# Patient Record
Sex: FEMALE | Race: WHITE | Employment: UNEMPLOYED | URBAN - METROPOLITAN AREA
[De-identification: names, ages, dates, MRNs, and addresses within clinical notes are randomized per-mention and may not be internally consistent; named-entity substitution may affect disease eponyms.]

---

## 2017-02-26 ENCOUNTER — HOSPITAL ENCOUNTER (INPATIENT)
Age: 27
LOS: 2 days | Discharge: ANOTHER HOSPITAL | DRG: 812 | End: 2017-03-01
Attending: EMERGENCY MEDICINE | Admitting: HOSPITALIST
Payer: MEDICAID

## 2017-02-26 DIAGNOSIS — T50.902A OVERDOSE, INTENTIONAL SELF-HARM, INITIAL ENCOUNTER (HCC): Primary | ICD-10-CM

## 2017-02-26 DIAGNOSIS — N30.00 ACUTE CYSTITIS WITHOUT HEMATURIA: ICD-10-CM

## 2017-02-26 DIAGNOSIS — T44.7X2A SUICIDE ATTEMPT BY BETA BLOCKER OVERDOSE, INITIAL ENCOUNTER (HCC): ICD-10-CM

## 2017-02-26 LAB
ALBUMIN SERPL BCP-MCNC: 4.2 G/DL (ref 3.4–5)
ALBUMIN/GLOB SERPL: 1.3 {RATIO} (ref 0.8–1.7)
ALP SERPL-CCNC: 67 U/L (ref 45–117)
ALT SERPL-CCNC: 18 U/L (ref 13–56)
AMPHET UR QL SCN: POSITIVE
ANION GAP BLD CALC-SCNC: 7 MMOL/L (ref 3–18)
APAP SERPL-MCNC: <2 UG/ML (ref 10–30)
APAP SERPL-MCNC: <2 UG/ML (ref 10–30)
APPEARANCE UR: CLEAR
AST SERPL W P-5'-P-CCNC: 13 U/L (ref 15–37)
BACTERIA URNS QL MICRO: ABNORMAL /HPF
BARBITURATES UR QL SCN: NEGATIVE
BASOPHILS # BLD AUTO: 0 K/UL (ref 0–0.06)
BASOPHILS # BLD: 0 % (ref 0–2)
BENZODIAZ UR QL: NEGATIVE
BILIRUB SERPL-MCNC: 0.5 MG/DL (ref 0.2–1)
BILIRUB UR QL: NEGATIVE
BUN SERPL-MCNC: 16 MG/DL (ref 7–18)
BUN/CREAT SERPL: 15 (ref 12–20)
CALCIUM SERPL-MCNC: 9.1 MG/DL (ref 8.5–10.1)
CANNABINOIDS UR QL SCN: NEGATIVE
CHLORIDE SERPL-SCNC: 104 MMOL/L (ref 100–108)
CO2 SERPL-SCNC: 28 MMOL/L (ref 21–32)
COCAINE UR QL SCN: NEGATIVE
COLOR UR: YELLOW
CREAT SERPL-MCNC: 1.07 MG/DL (ref 0.6–1.3)
DIFFERENTIAL METHOD BLD: ABNORMAL
EOSINOPHIL # BLD: 0.1 K/UL (ref 0–0.4)
EOSINOPHIL NFR BLD: 1 % (ref 0–5)
EPITH CASTS URNS QL MICRO: ABNORMAL /LPF (ref 0–5)
ERYTHROCYTE [DISTWIDTH] IN BLOOD BY AUTOMATED COUNT: 16 % (ref 11.6–14.5)
ETHANOL SERPL-MCNC: <3 MG/DL (ref 0–3)
GLOBULIN SER CALC-MCNC: 3.2 G/DL (ref 2–4)
GLUCOSE SERPL-MCNC: 97 MG/DL (ref 74–99)
GLUCOSE UR STRIP.AUTO-MCNC: NEGATIVE MG/DL
HCG UR QL: NEGATIVE
HCT VFR BLD AUTO: 37.2 % (ref 35–45)
HDSCOM,HDSCOM: ABNORMAL
HGB BLD-MCNC: 11.7 G/DL (ref 12–16)
HGB UR QL STRIP: NEGATIVE
KETONES UR QL STRIP.AUTO: NEGATIVE MG/DL
LEUKOCYTE ESTERASE UR QL STRIP.AUTO: ABNORMAL
LYMPHOCYTES # BLD AUTO: 30 % (ref 21–52)
LYMPHOCYTES # BLD: 2.4 K/UL (ref 0.9–3.6)
MCH RBC QN AUTO: 28.1 PG (ref 24–34)
MCHC RBC AUTO-ENTMCNC: 31.5 G/DL (ref 31–37)
MCV RBC AUTO: 89.2 FL (ref 74–97)
METHADONE UR QL: NEGATIVE
MONOCYTES # BLD: 0.5 K/UL (ref 0.05–1.2)
MONOCYTES NFR BLD AUTO: 6 % (ref 3–10)
NEUTS SEG # BLD: 5.2 K/UL (ref 1.8–8)
NEUTS SEG NFR BLD AUTO: 63 % (ref 40–73)
NITRITE UR QL STRIP.AUTO: NEGATIVE
OPIATES UR QL: POSITIVE
PCP UR QL: NEGATIVE
PH UR STRIP: 5.5 [PH] (ref 5–8)
PLATELET # BLD AUTO: 259 K/UL (ref 135–420)
PMV BLD AUTO: 11.1 FL (ref 9.2–11.8)
POTASSIUM SERPL-SCNC: 3.7 MMOL/L (ref 3.5–5.5)
PROT SERPL-MCNC: 7.4 G/DL (ref 6.4–8.2)
PROT UR STRIP-MCNC: NEGATIVE MG/DL
RBC # BLD AUTO: 4.17 M/UL (ref 4.2–5.3)
RBC #/AREA URNS HPF: ABNORMAL /HPF (ref 0–5)
SALICYLATES SERPL-MCNC: <2.8 MG/DL (ref 2.8–20)
SODIUM SERPL-SCNC: 139 MMOL/L (ref 136–145)
SP GR UR REFRACTOMETRY: >1.03 (ref 1–1.03)
UROBILINOGEN UR QL STRIP.AUTO: 0.2 EU/DL (ref 0.2–1)
WBC # BLD AUTO: 8.2 K/UL (ref 4.6–13.2)
WBC URNS QL MICRO: ABNORMAL /HPF (ref 0–4)

## 2017-02-26 PROCEDURE — 80307 DRUG TEST PRSMV CHEM ANLYZR: CPT | Performed by: EMERGENCY MEDICINE

## 2017-02-26 PROCEDURE — 74011250637 HC RX REV CODE- 250/637: Performed by: EMERGENCY MEDICINE

## 2017-02-26 PROCEDURE — 96376 TX/PRO/DX INJ SAME DRUG ADON: CPT

## 2017-02-26 PROCEDURE — 99285 EMERGENCY DEPT VISIT HI MDM: CPT

## 2017-02-26 PROCEDURE — 80053 COMPREHEN METABOLIC PANEL: CPT | Performed by: EMERGENCY MEDICINE

## 2017-02-26 PROCEDURE — 93005 ELECTROCARDIOGRAM TRACING: CPT

## 2017-02-26 PROCEDURE — 85025 COMPLETE CBC W/AUTO DIFF WBC: CPT | Performed by: EMERGENCY MEDICINE

## 2017-02-26 PROCEDURE — 81001 URINALYSIS AUTO W/SCOPE: CPT | Performed by: EMERGENCY MEDICINE

## 2017-02-26 PROCEDURE — 96361 HYDRATE IV INFUSION ADD-ON: CPT

## 2017-02-26 PROCEDURE — 74011250636 HC RX REV CODE- 250/636: Performed by: EMERGENCY MEDICINE

## 2017-02-26 PROCEDURE — 87086 URINE CULTURE/COLONY COUNT: CPT | Performed by: EMERGENCY MEDICINE

## 2017-02-26 PROCEDURE — 81025 URINE PREGNANCY TEST: CPT | Performed by: EMERGENCY MEDICINE

## 2017-02-26 PROCEDURE — 96375 TX/PRO/DX INJ NEW DRUG ADDON: CPT

## 2017-02-26 PROCEDURE — 96365 THER/PROPH/DIAG IV INF INIT: CPT

## 2017-02-26 RX ORDER — NITROFURANTOIN 25; 75 MG/1; MG/1
100 CAPSULE ORAL 2 TIMES DAILY
Status: DISCONTINUED | OUTPATIENT
Start: 2017-02-26 | End: 2017-03-01 | Stop reason: HOSPADM

## 2017-02-26 RX ORDER — ONDANSETRON 2 MG/ML
4 INJECTION INTRAMUSCULAR; INTRAVENOUS
Status: COMPLETED | OUTPATIENT
Start: 2017-02-26 | End: 2017-02-26

## 2017-02-26 RX ORDER — CALCIUM GLUCONATE 94 MG/ML
1 INJECTION, SOLUTION INTRAVENOUS
Status: DISCONTINUED | OUTPATIENT
Start: 2017-02-26 | End: 2017-02-26

## 2017-02-26 RX ORDER — ONDANSETRON 4 MG/1
4 TABLET, ORALLY DISINTEGRATING ORAL
Status: COMPLETED | OUTPATIENT
Start: 2017-02-26 | End: 2017-02-26

## 2017-02-26 RX ORDER — CALCIUM GLUCONATE 94 MG/ML
3.5 INJECTION, SOLUTION INTRAVENOUS
Status: COMPLETED | OUTPATIENT
Start: 2017-02-26 | End: 2017-02-27

## 2017-02-26 RX ADMIN — ONDANSETRON 4 MG: 4 TABLET, ORALLY DISINTEGRATING ORAL at 18:47

## 2017-02-26 RX ADMIN — NITROFURANTOIN (MONOHYDRATE/MACROCRYSTALS) 100 MG: 75; 25 CAPSULE ORAL at 18:49

## 2017-02-26 RX ADMIN — SODIUM CHLORIDE 1000 ML: 900 INJECTION, SOLUTION INTRAVENOUS at 20:11

## 2017-02-26 RX ADMIN — ONDANSETRON 4 MG: 2 INJECTION INTRAMUSCULAR; INTRAVENOUS at 20:11

## 2017-02-26 RX ADMIN — SODIUM CHLORIDE 1000 ML: 900 INJECTION, SOLUTION INTRAVENOUS at 21:18

## 2017-02-26 RX ADMIN — SODIUM CHLORIDE 1000 ML: 900 INJECTION, SOLUTION INTRAVENOUS at 22:56

## 2017-02-26 RX ADMIN — GLUCAGON HYDROCHLORIDE 5 MG: KIT at 23:43

## 2017-02-26 NOTE — ED PROVIDER NOTES
HPI Comments: 3:53 PM Dhara Lynch is a 23 y.o. Female with a history of thyroid disease, seizure, and HTN presenting to the ED via EMS with SI. Pt states she took labetal , almost the entire bottle, and ibuprofen , a large handful. She took the ibuprofen 1.5 hours ago and labetalol immediately prior to EMS arrival. She has attempted suicide before, she states she \"took pills\" 7 years ago. She feels mild pain in upper abd, drowsiness, and light headedness currently. Pt denies nausea, vomiting, diarrhea, fever, CP, and cough. No other complaints at this time. The history is provided by the EMS personnel. Past Medical History:   Diagnosis Date    Endocrine disease Hypothyroidism    Hypertension     Seizures ([facility] [address]     epilepsy    Thyroid disease        Past Surgical History:   Procedure Laterality Date    HX TONSILLECTOMY      Age 7         Family History:   Problem Relation Age of Onset    Diabetes Other      great grandmother    Hypertension Other      family history    Thyroid Disease Other      great grandmother       Social History     Social History    Marital status: SINGLE     Spouse name: N/A    Number of children: N/A    Years of education: N/A     Occupational History    Not on file. Social History Main Topics    Smoking status: Former Smoker     Packs/day: 0.50     Years: 5.00    Smokeless tobacco: Never Used    Alcohol use 0.0 oz/week     0 Standard drinks or equivalent per week      Comment: sometimes    Drug use: No      Comment: pt denies    Sexual activity: Yes     Partners: Male     Birth control/ protection: None     Other Topics Concern    Not on file     Social History Narrative         ALLERGIES: Review of patient's allergies indicates no known allergies. Review of Systems   Constitutional: Negative for fever. HENT: Negative for trouble swallowing. Respiratory: Negative for shortness of breath. Cardiovascular: Negative for chest pain. Gastrointestinal: Negative for abdominal pain, diarrhea and vomiting. Genitourinary: Negative for difficulty urinating. Musculoskeletal: Negative for back pain and neck pain. Skin: Negative for wound. Neurological: Negative for syncope. Psychiatric/Behavioral: Positive for self-injury (overdose on prescription medication) and suicidal ideas. Negative for behavioral problems. All other systems reviewed and are negative. Vitals:    02/26/17 1645 02/26/17 1700 02/26/17 1715 02/26/17 1730   BP: 125/80 123/85 125/78 124/83   Pulse: 74 69 69 79   Resp: 18 14 17 18   Temp:       SpO2: 98% 99% 97% 99%   Weight:       Height:                Physical Exam   Constitutional: She is oriented to person, place, and time. She appears well-developed. No distress. Chronically ill -appearing, nad   HENT:   Head: Normocephalic and atraumatic. Eyes: EOM are normal.   Neck: Normal range of motion. Cardiovascular: Normal rate. Pulmonary/Chest: Effort normal and breath sounds normal. No respiratory distress. Abdominal: Soft. There is no tenderness. Musculoskeletal: Normal range of motion. Mechanically stable   Neurological: She is alert and oriented to person, place, and time. No focal deficits noted   Psychiatric: Her behavior is normal. She expresses suicidal ideation. Nursing note and vitals reviewed. MDM  Number of Diagnoses or Management Options  Acute cystitis without hematuria:   Overdose, intentional self-harm, initial encounter [facility]):   Diagnosis management comments: 24 yo CF with PMHx HTN presents by EMS with intentional overdose of ibuprofen and labetalol. Pt still actively suicidal.  Examination unremarkable. Pt will need psych eval after medical clearance. 5:45 PM  Pt more awake and alert, work-up unremarkable so far and VSS. I discussed pt with poison control, who recommended 4-hr acetaminophen level, consider charcoal, continue to monitor.   Will not give charcoal at this time given asymptomatic with normal VS since arrival.  I updated pt and family on results and poc. 6:48 PM  +uti, will culture and treat. +opiates, +amphetamines. Negative pregnancy. Care to be transferred to Dr. Saba Wang at end of shift pending 4-hr acetaminophen and psych evaluation/recommendations       Amount and/or Complexity of Data Reviewed  Clinical lab tests: reviewed and ordered  Decide to obtain previous medical records or to obtain history from someone other than the patient: yes  Obtain history from someone other than the patient: yes  Review and summarize past medical records: yes  Discuss the patient with other providers: yes  Independent visualization of images, tracings, or specimens: yes      ED Course       EKG  Date/Time: 2/26/2017 5:12 PM  Performed by: Sachi Dennis  Authorized by: Sachi Dennis     ECG reviewed by ED Physician in the absence of a cardiologist: yes    Previous ECG:     Previous ECG:  Compared to current    Comparison ECG info:  10/28/13    Similarity:  No change  Interpretation:     Interpretation: non-specific    Rate:     ECG rate:  69    ECG rate assessment: normal    Rhythm:     Rhythm: sinus rhythm    Ectopy:     Ectopy: none    QRS:     QRS axis:  Normal    QRS intervals:  Normal  Conduction:     Conduction: normal    ST segments:     ST segments:  Normal  T waves:     T waves: flattening          Progress notes, Consult notes or additional Procedure notes:   5:03 PM Pratibha Santamaria MD discussed care with Poison Control who recommends monitoring for hypotension and bradycardia, abd pain, and vomiting, and check acetaminophen level at 4 hours consider activated charcoal if the patient can tolerate. Standard discussion; including history of patients chief complaint, available diagnostic results, and treatment course. 5:36 PM Pt's mother at the bedside.  She states the patient has expressed her depression to her and she wanted her to see her PCP for psych follow up but she did not want to go. Addison Kwok MD  discussed findings and plan for psych consult. 7:00 PM Pt turned over to Dr. Conor Higuera pending repeat acetaminophen level at 8:00PM and Psych evaluation. Vitals:  Patient Vitals for the past 12 hrs:   Temp Pulse Resp BP SpO2   02/26/17 1730 - 79 18 124/83 99 %   02/26/17 1715 - 69 17 125/78 97 %   02/26/17 1700 - 69 14 123/85 99 %   02/26/17 1645 - 74 18 125/80 98 %   02/26/17 1630 - 73 14 123/82 99 %   02/26/17 1626 98.4 °F (36.9 °C) - - - -   02/26/17 1615 - 72 20 125/81 99 %   02/26/17 1600 - 76 19 131/89 100 %   Pulsox interpreted within normal limits. Medications ordered:   Medications - No data to display      Lab findings:  Recent Results (from the past 12 hour(s))   CBC WITH AUTOMATED DIFF    Collection Time: 02/26/17  4:10 PM   Result Value Ref Range    WBC 8.2 4.6 - 13.2 K/uL    RBC 4.17 (L) 4.20 - 5.30 M/uL    HGB 11.7 (L) 12.0 - 16.0 g/dL    HCT 37.2 35.0 - 45.0 %    MCV 89.2 74.0 - 97.0 FL    MCH 28.1 24.0 - 34.0 PG    MCHC 31.5 31.0 - 37.0 g/dL    RDW 16.0 (H) 11.6 - 14.5 %    PLATELET 650 907 - 629 K/uL    MPV 11.1 9.2 - 11.8 FL    NEUTROPHILS 63 40 - 73 %    LYMPHOCYTES 30 21 - 52 %    MONOCYTES 6 3 - 10 %    EOSINOPHILS 1 0 - 5 %    BASOPHILS 0 0 - 2 %    ABS. NEUTROPHILS 5.2 1.8 - 8.0 K/UL    ABS. LYMPHOCYTES 2.4 0.9 - 3.6 K/UL    ABS. MONOCYTES 0.5 0.05 - 1.2 K/UL    ABS. EOSINOPHILS 0.1 0.0 - 0.4 K/UL    ABS.  BASOPHILS 0.0 0.0 - 0.06 K/UL    DF AUTOMATED     METABOLIC PANEL, COMPREHENSIVE    Collection Time: 02/26/17  4:10 PM   Result Value Ref Range    Sodium 139 136 - 145 mmol/L    Potassium 3.7 3.5 - 5.5 mmol/L    Chloride 104 100 - 108 mmol/L    CO2 28 21 - 32 mmol/L    Anion gap 7 3.0 - 18 mmol/L    Glucose 97 74 - 99 mg/dL    BUN 16 7.0 - 18 MG/DL    Creatinine 1.07 0.6 - 1.3 MG/DL    BUN/Creatinine ratio 15 12 - 20      GFR est AA >60 >60 ml/min/1.73m2    GFR est non-AA >60 >60 ml/min/1.73m2    Calcium 9.1 8.5 - 10.1 MG/DL    Bilirubin, total 0.5 0.2 - 1.0 MG/DL    ALT (SGPT) 18 13 - 56 U/L    AST (SGOT) 13 (L) 15 - 37 U/L    Alk. phosphatase 67 45 - 117 U/L    Protein, total 7.4 6.4 - 8.2 g/dL    Albumin 4.2 3.4 - 5.0 g/dL    Globulin 3.2 2.0 - 4.0 g/dL    A-G Ratio 1.3 0.8 - 1.7     ACETAMINOPHEN    Collection Time: 02/26/17  4:10 PM   Result Value Ref Range    ACETAMINOPHEN <2 (L) 10 - 30 ug/mL   SALICYLATE    Collection Time: 02/26/17  4:10 PM   Result Value Ref Range    SALICYLATE <7.2 (L) 2.8 - 20.0 MG/DL   ETHYL ALCOHOL    Collection Time: 02/26/17  4:10 PM   Result Value Ref Range    ALCOHOL(ETHYL),SERUM <3 0 - 3 MG/DL   EKG, 12 LEAD, INITIAL    Collection Time: 02/26/17  4:39 PM   Result Value Ref Range    Ventricular Rate 69 BPM    Atrial Rate 69 BPM    P-R Interval 192 ms    QRS Duration 86 ms    Q-T Interval 394 ms    QTC Calculation (Bezet) 422 ms    Calculated P Axis 36 degrees    Calculated R Axis 70 degrees    Calculated T Axis 24 degrees    Diagnosis       Normal sinus rhythm  Nonspecific T wave abnormality  Abnormal ECG  When compared with ECG of 28-OCT-2013 16:03,  Nonspecific T wave abnormality now evident in Anterolateral leads     URINALYSIS W/ RFLX MICROSCOPIC    Collection Time: 02/26/17  5:57 PM   Result Value Ref Range    Color YELLOW      Appearance CLEAR      Specific gravity >1.030 (H) 1.005 - 1.030    pH (UA) 5.5 5.0 - 8.0      Protein NEGATIVE  NEG mg/dL    Glucose NEGATIVE  NEG mg/dL    Ketone NEGATIVE  NEG mg/dL    Bilirubin NEGATIVE  NEG      Blood NEGATIVE  NEG      Urobilinogen 0.2 0.2 - 1.0 EU/dL    Nitrites NEGATIVE  NEG      Leukocyte Esterase SMALL (A) NEG     HCG URINE, QL    Collection Time: 02/26/17  5:57 PM   Result Value Ref Range    HCG urine, Ql. NEGATIVE  NEG             Disposition:  Diagnosis:   1.  Overdose, intentional self-harm, initial encounter [facility])        Disposition: Pending    Follow-up Information     None           Patient's Medications   Start Taking    No medications on file   Continue Taking    LABETALOL (NORMODYNE) 100 MG TABLET    100 mg. LEVOTHYROXINE (SYNTHROID) 25 MCG TABLET    Take 1 Tab by mouth Daily (before breakfast). LEVOTHYROXINE (SYNTHROID) 300 MCG TABLET    Take 1 Tab by mouth Daily (before breakfast). PNV NO.[id number] ACID-DHA -899 MG-MCG-MG CMPK    Take  by mouth. These Medications have changed    No medications on file   Stop Taking    No medications on file       SCRIBE ATTESTATION STATEMENT  Documented by: Geeta aguilaribing for, and in the presence of,Murtaza Silva MD      Signed by: Nelia Fan, 02/26/17, 3:54 PM            PROVIDER ATTESTATION STATEMENT  I personally performed the services described in the documentation, reviewed the documentation, as recorded by the scribe in my presence, and it accurately and completely records my words and actions.   Shabbir Hughes MD

## 2017-02-26 NOTE — ED TRIAGE NOTES
Patient states she took a handful of motrin 200 mg (approximately 40-50 pills) patient is not sure, called EMS then took the labetolol 100 mg, patient states the bottle was full today when she took the medications.  QTY 60, left in the bottle are 8 pills, therefore the patient took 52 labetolol

## 2017-02-26 NOTE — ED NOTES
Contacted poison control to let them know about patient.  Provided details of overdose and informed them of patient's condition

## 2017-02-26 NOTE — IP AVS SNAPSHOT
[address] Patient: Ganesh Kolb MRN: [id number] You are allergic to the following No active allergies Recent Documentation Height  
  
  
  
  
  
 1.651 m Emergency Contacts Name Discharge Info Relation Home Work Mobile Mack Mcduffie  Spouse [3] [phone] [address] CAREGIVER [3] Parent [1] [phone] About your hospitalization You were admitted on:  February 27, 2017 You last received care in the:  [address] [place] [address],[address] You were discharged on:  March 1, 2017 Unit phone number:  [phone] Why you were hospitalized Your primary diagnosis was:  Not on File Your diagnoses also included:  Suicide Attempt By Beta Blocker Overdose (Hcc) Providers Seen During Your Hospitalizations Provider Role Specialty Primary office phone Judy Huerta MD Attending Provider Emergency Medicine [phone] Rolan Begum MD Attending Provider [facility] [phone] Markell Adame MD Attending Provider Internal Medicine [phone] Your Primary Care Physician (PCP) Primary Care Physician Office Phone Office Fax [address] Follow-up Information Follow up With Details Comments Contact Info Ryan Bowman MD   [id number] [place] [address] [phone] Current Discharge Medication List  
  
START taking these medications Dose & Instructions Dispensing Information Comments Morning Noon Evening Bedtime  
 nitrofurantoin (macrocrystal-monohydrate) 100 mg capsule Commonly known as:  MACROBID Your next dose is: Today, Tomorrow Other:  _________ Dose:  100 mg Take 1 Cap by mouth two (2) times a day. Quantity:  10 Cap Refills:  0 CONTINUE these medications which have CHANGED Dose & Instructions Dispensing Information Comments Morning Noon Evening Bedtime  
 levothyroxine 300 mcg tablet Commonly known as:  SYNTHROID What changed:  Another medication with the same name was removed. Continue taking this medication, and follow the directions you see here. Your next dose is: Today, Tomorrow Other:  _________ Dose:  300 mcg Take 1 Tab by mouth Daily (before breakfast). Quantity:  30 Tab Refills:  1 CONTINUE these medications which have NOT CHANGED Dose & Instructions Dispensing Information Comments Morning Noon Evening Bedtime PNV no.24-iron-folic acid-dha -010 mg-mcg-mg Cmpk Your next dose is: Today, Tomorrow Other:  _________ Take  by mouth. Refills:  0 STOP taking these medications   
 labetalol 100 mg tablet Commonly known as:  Kanchan Yee Where to Get Your Medications Information on where to get these meds will be given to you by the nurse or doctor. ! Ask your nurse or doctor about these medications  
  nitrofurantoin (macrocrystal-monohydrate) 100 mg capsule Discharge Instructions DISCHARGE SUMMARY from Nurse The following personal items are in your possession at time of discharge: 
 
Dental Appliances: None Visual Aid: None Home Medications: None Jewelry: Bracelet (X 4) Clothing: Pajamas, Footwear, Socks, Undergarments Other Valuables: None PATIENT INSTRUCTIONS: 
 
 
F-face looks uneven A-arms unable to move or move unevenly S-speech slurred or non-existent T-time-call 911 as soon as signs and symptoms begin-DO NOT go Back to bed or wait to see if you get better-TIME IS BRAIN. Warning Signs of HEART ATTACK Call 911 if you have these symptoms: 
? Chest discomfort. Most heart attacks involve discomfort in the center of the chest that lasts more than a few minutes, or that goes away and comes back. It can feel like uncomfortable pressure, squeezing, fullness, or pain. ? Discomfort in other areas of the upper body. Symptoms can include pain or discomfort in one or both arms, the back, neck, jaw, or stomach. ? Shortness of breath with or without chest discomfort. ? Other signs may include breaking out in a cold sweat, nausea, or lightheadedness. Don't wait more than five minutes to call [address]! Fast action can save your life. Calling 911 is almost always the fastest way to get lifesaving treatment. Emergency Medical Services staff can begin treatment when they arrive  up to an hour sooner than if someone gets to the hospital by car. The discharge information has been reviewed with the patient. The patient verbalized understanding. Discharge medications reviewed with the patient and appropriate educational materials and side effects teaching were provided. Patient armband removed and shredded. Benbria Activation Thank you for requesting access to Benb[employer]. Please follow the instructions below to securely access and download your online medical record. Ben[employer]ria allows you to send messages to your doctor, view your test results, renew your prescriptions, schedule appointments, and more. How Do I Sign Up? 1. In your internet browser, go to [email or web address] 
2. Click on the First Time User? Click Here link in the Sign In box. You will be redirect to the New Member Sign Up page. 3. Enter your ShopText Access Code exactly as it appears below. You will not need to use this code after youve completed the sign-up process. If you do not sign up before the expiration date, you must request a new code. "[employer]t Access Code: Activation code not generated Current ShopText Status: Active (This is the date your ShopText access code will ) 4. Enter the last four digits of your Social Security Number (xxxx) and Date of Birth (mm/dd/yyyy) as indicated and click Submit. You will be taken to the next sign-up page. 5. Create a "[employer]t ID. This will be your ShopText login ID and cannot be changed, so think of one that is secure and easy to remember. 6. Create a ShopText password. You can change your password at any time. 7. Enter your Password Reset Question and Answer. This can be used at a later time if you forget your password. 8. Enter your e-mail address. You will receive e-mail notification when new information is available in [address]. 9. Click Sign Up. You can now view and download portions of your medical record. 10. Click the Download Summary menu link to download a portable copy of your medical information. Additional Information If you have questions, please visit the Frequently Asked Questions section of the ShopText website at [email or web address] Remember, ShopText is NOT to be used for urgent needs. For medical emergencies, dial 911. Discharge Orders None ShopText Announcement We are excited to announce that we are making your provider's discharge notes available to you in ShopText. You will see these notes when they are completed and signed by the physician that discharged you from your recent hospital stay.   If you have any questions or concerns about any information you see in ShopText, please call the Health Information Department where you were seen or reach out to your Primary Care Provider for more information about your plan of care. Introducing [facility]! Deavishal Barlow: Thank you for requesting a Pand[employer] account. Our records indicate that you already have an active Pand[employer] Media account. You can access your account anytime at [email or web address] Media Did you know that you can access your hospital and ER discharge instructions at any time in [employer] Media? You can also review all of your test results from your hospital stay or ER visit. Additional Information If you have questions, please visit the Frequently Asked Questions section of the [employer] Media website at [email or web address] Remember, [employer] Media is NOT to be used for urgent needs. For medical emergencies, dial 911. Now available from your iPhone and Android! General Information Please provide this summary of care documentation to your next provider. Patient Signature:  ____________________________________________________________ Date:  ____________________________________________________________  
  
Dawna Charter Provider Signature:  ____________________________________________________________ Date:  ____________________________________________________________

## 2017-02-27 ENCOUNTER — APPOINTMENT (OUTPATIENT)
Dept: GENERAL RADIOLOGY | Age: 27
DRG: 812 | End: 2017-02-27
Attending: EMERGENCY MEDICINE
Payer: MEDICAID

## 2017-02-27 PROBLEM — T44.7X2A SUICIDE ATTEMPT BY BETA BLOCKER OVERDOSE (HCC): Status: ACTIVE | Noted: 2017-02-27

## 2017-02-27 LAB
ANION GAP BLD CALC-SCNC: 14 MMOL/L (ref 3–18)
APTT PPP: 31.8 SEC (ref 23–36.4)
ATRIAL RATE: 69 BPM
ATRIAL RATE: 72 BPM
BUN SERPL-MCNC: 17 MG/DL (ref 7–18)
BUN/CREAT SERPL: 17 (ref 12–20)
CA-I SERPL-SCNC: 1.28 MMOL/L (ref 1.12–1.32)
CALCIUM SERPL-MCNC: 8.5 MG/DL (ref 8.5–10.1)
CALCULATED P AXIS, ECG09: 36 DEGREES
CALCULATED P AXIS, ECG09: 63 DEGREES
CALCULATED R AXIS, ECG10: 70 DEGREES
CALCULATED R AXIS, ECG10: 84 DEGREES
CALCULATED T AXIS, ECG11: 24 DEGREES
CALCULATED T AXIS, ECG11: 55 DEGREES
CHLORIDE SERPL-SCNC: 110 MMOL/L (ref 100–108)
CO2 SERPL-SCNC: 19 MMOL/L (ref 21–32)
CREAT SERPL-MCNC: 1 MG/DL (ref 0.6–1.3)
DIAGNOSIS, 93000: NORMAL
DIAGNOSIS, 93000: NORMAL
ERYTHROCYTE [DISTWIDTH] IN BLOOD BY AUTOMATED COUNT: 16.5 % (ref 11.6–14.5)
GLUCOSE BLD STRIP.AUTO-MCNC: 139 MG/DL (ref 70–110)
GLUCOSE BLD STRIP.AUTO-MCNC: 139 MG/DL (ref 70–110)
GLUCOSE BLD STRIP.AUTO-MCNC: 154 MG/DL (ref 70–110)
GLUCOSE BLD STRIP.AUTO-MCNC: 155 MG/DL (ref 70–110)
GLUCOSE BLD STRIP.AUTO-MCNC: 170 MG/DL (ref 70–110)
GLUCOSE BLD STRIP.AUTO-MCNC: 219 MG/DL (ref 70–110)
GLUCOSE BLD STRIP.AUTO-MCNC: 48 MG/DL (ref 70–110)
GLUCOSE BLD STRIP.AUTO-MCNC: 87 MG/DL (ref 70–110)
GLUCOSE BLD STRIP.AUTO-MCNC: 89 MG/DL (ref 70–110)
GLUCOSE SERPL-MCNC: 107 MG/DL (ref 74–99)
HCT VFR BLD AUTO: 28.9 % (ref 35–45)
HGB BLD-MCNC: 8.9 G/DL (ref 12–16)
MAGNESIUM SERPL-MCNC: 1.7 MG/DL (ref 1.8–2.4)
MAGNESIUM SERPL-MCNC: 2.3 MG/DL (ref 1.8–2.4)
MCH RBC QN AUTO: 28 PG (ref 24–34)
MCHC RBC AUTO-ENTMCNC: 30.8 G/DL (ref 31–37)
MCV RBC AUTO: 90.9 FL (ref 74–97)
P-R INTERVAL, ECG05: 192 MS
P-R INTERVAL, ECG05: 198 MS
PLATELET # BLD AUTO: 203 K/UL (ref 135–420)
PMV BLD AUTO: 11.9 FL (ref 9.2–11.8)
POTASSIUM SERPL-SCNC: 3.5 MMOL/L (ref 3.5–5.5)
Q-T INTERVAL, ECG07: 394 MS
Q-T INTERVAL, ECG07: 440 MS
QRS DURATION, ECG06: 86 MS
QRS DURATION, ECG06: 92 MS
QTC CALCULATION (BEZET), ECG08: 422 MS
QTC CALCULATION (BEZET), ECG08: 481 MS
RBC # BLD AUTO: 3.18 M/UL (ref 4.2–5.3)
SODIUM SERPL-SCNC: 143 MMOL/L (ref 136–145)
TSH SERPL DL<=0.05 MIU/L-ACNC: >100 UIU/ML (ref 0.36–3.74)
VENTRICULAR RATE, ECG03: 69 BPM
VENTRICULAR RATE, ECG03: 72 BPM
WBC # BLD AUTO: 9.8 K/UL (ref 4.6–13.2)

## 2017-02-27 PROCEDURE — 80048 BASIC METABOLIC PNL TOTAL CA: CPT | Performed by: PHYSICIAN ASSISTANT

## 2017-02-27 PROCEDURE — 93005 ELECTROCARDIOGRAM TRACING: CPT

## 2017-02-27 PROCEDURE — 74011250636 HC RX REV CODE- 250/636: Performed by: INTERNAL MEDICINE

## 2017-02-27 PROCEDURE — 74011250636 HC RX REV CODE- 250/636: Performed by: EMERGENCY MEDICINE

## 2017-02-27 PROCEDURE — 82962 GLUCOSE BLOOD TEST: CPT

## 2017-02-27 PROCEDURE — 65610000006 HC RM INTENSIVE CARE

## 2017-02-27 PROCEDURE — 74011000258 HC RX REV CODE- 258: Performed by: INTERNAL MEDICINE

## 2017-02-27 PROCEDURE — 77030020245 HC SOL INJ 5% D/0.9%NACL

## 2017-02-27 PROCEDURE — 74011250637 HC RX REV CODE- 250/637: Performed by: EMERGENCY MEDICINE

## 2017-02-27 PROCEDURE — 84443 ASSAY THYROID STIM HORMONE: CPT | Performed by: PHYSICIAN ASSISTANT

## 2017-02-27 PROCEDURE — 74011000250 HC RX REV CODE- 250

## 2017-02-27 PROCEDURE — 83735 ASSAY OF MAGNESIUM: CPT | Performed by: PHYSICIAN ASSISTANT

## 2017-02-27 PROCEDURE — 74011250636 HC RX REV CODE- 250/636

## 2017-02-27 PROCEDURE — 82330 ASSAY OF CALCIUM: CPT | Performed by: INTERNAL MEDICINE

## 2017-02-27 PROCEDURE — 74011000258 HC RX REV CODE- 258: Performed by: EMERGENCY MEDICINE

## 2017-02-27 PROCEDURE — 71010 XR CHEST PORT: CPT

## 2017-02-27 PROCEDURE — 85730 THROMBOPLASTIN TIME PARTIAL: CPT | Performed by: INTERNAL MEDICINE

## 2017-02-27 PROCEDURE — 85027 COMPLETE CBC AUTOMATED: CPT | Performed by: INTERNAL MEDICINE

## 2017-02-27 PROCEDURE — 36592 COLLECT BLOOD FROM PICC: CPT

## 2017-02-27 PROCEDURE — 83735 ASSAY OF MAGNESIUM: CPT | Performed by: INTERNAL MEDICINE

## 2017-02-27 PROCEDURE — 74011250636 HC RX REV CODE- 250/636: Performed by: HOSPITALIST

## 2017-02-27 PROCEDURE — 74011000250 HC RX REV CODE- 250: Performed by: INTERNAL MEDICINE

## 2017-02-27 RX ORDER — DOPAMINE HYDROCHLORIDE 160 MG/100ML
0-10 INJECTION, SOLUTION INTRAVENOUS
Status: DISCONTINUED | OUTPATIENT
Start: 2017-02-27 | End: 2017-02-28

## 2017-02-27 RX ORDER — DEXTROSE 50 % IN WATER (D50W) INTRAVENOUS SYRINGE
Status: COMPLETED
Start: 2017-02-27 | End: 2017-02-27

## 2017-02-27 RX ORDER — CALCIUM GLUCONATE 94 MG/ML
INJECTION, SOLUTION INTRAVENOUS
Status: COMPLETED
Start: 2017-02-27 | End: 2017-02-27

## 2017-02-27 RX ORDER — SODIUM CHLORIDE 9 MG/ML
150 INJECTION, SOLUTION INTRAVENOUS CONTINUOUS
Status: DISCONTINUED | OUTPATIENT
Start: 2017-02-27 | End: 2017-02-27

## 2017-02-27 RX ORDER — CALCIUM GLUCONATE 94 MG/ML
INJECTION, SOLUTION INTRAVENOUS
Status: DISPENSED
Start: 2017-02-27 | End: 2017-02-27

## 2017-02-27 RX ORDER — ONDANSETRON 2 MG/ML
4 INJECTION INTRAMUSCULAR; INTRAVENOUS
Status: COMPLETED | OUTPATIENT
Start: 2017-02-27 | End: 2017-02-27

## 2017-02-27 RX ORDER — NOREPINEPHRINE BITARTRATE/D5W 8 MG/250ML
0-16 PLASTIC BAG, INJECTION (ML) INTRAVENOUS
Status: DISCONTINUED | OUTPATIENT
Start: 2017-02-27 | End: 2017-02-27

## 2017-02-27 RX ORDER — DEXTROSE MONOHYDRATE AND SODIUM CHLORIDE 5; .9 G/100ML; G/100ML
75 INJECTION, SOLUTION INTRAVENOUS CONTINUOUS
Status: DISCONTINUED | OUTPATIENT
Start: 2017-02-27 | End: 2017-02-28

## 2017-02-27 RX ORDER — DEXTROSE 50 % IN WATER (D50W) INTRAVENOUS SYRINGE
Status: DISPENSED
Start: 2017-02-27 | End: 2017-02-27

## 2017-02-27 RX ORDER — CALCIUM GLUCONATE 94 MG/ML
1 INJECTION, SOLUTION INTRAVENOUS ONCE
Status: COMPLETED | OUTPATIENT
Start: 2017-02-27 | End: 2017-02-27

## 2017-02-27 RX ORDER — SODIUM CHLORIDE 0.9 % (FLUSH) 0.9 %
5-10 SYRINGE (ML) INJECTION AS NEEDED
Status: DISCONTINUED | OUTPATIENT
Start: 2017-02-27 | End: 2017-03-01 | Stop reason: HOSPADM

## 2017-02-27 RX ORDER — DOPAMINE HYDROCHLORIDE 160 MG/100ML
INJECTION, SOLUTION INTRAVENOUS
Status: COMPLETED
Start: 2017-02-27 | End: 2017-02-27

## 2017-02-27 RX ORDER — SODIUM CHLORIDE 0.9 % (FLUSH) 0.9 %
5-10 SYRINGE (ML) INJECTION EVERY 8 HOURS
Status: DISCONTINUED | OUTPATIENT
Start: 2017-02-27 | End: 2017-03-01 | Stop reason: HOSPADM

## 2017-02-27 RX ORDER — MAGNESIUM SULFATE 1 G/100ML
1 INJECTION INTRAVENOUS ONCE
Status: COMPLETED | OUTPATIENT
Start: 2017-02-27 | End: 2017-02-27

## 2017-02-27 RX ORDER — ENOXAPARIN SODIUM 100 MG/ML
40 INJECTION SUBCUTANEOUS EVERY 24 HOURS
Status: DISCONTINUED | OUTPATIENT
Start: 2017-02-27 | End: 2017-03-01 | Stop reason: HOSPADM

## 2017-02-27 RX ADMIN — ONDANSETRON 4 MG: 2 INJECTION INTRAMUSCULAR; INTRAVENOUS at 00:28

## 2017-02-27 RX ADMIN — GLUCAGON HYDROCHLORIDE 4 MG/HR: KIT at 03:01

## 2017-02-27 RX ADMIN — CALCIUM GLUCONATE 3 G: 94 INJECTION, SOLUTION INTRAVENOUS at 09:12

## 2017-02-27 RX ADMIN — GLUCAGON HYDROCHLORIDE 4 MG/HR: KIT at 05:45

## 2017-02-27 RX ADMIN — DOPAMINE HYDROCHLORIDE 5 MCG/KG/MIN: 160 INJECTION, SOLUTION INTRAVENOUS at 10:00

## 2017-02-27 RX ADMIN — DOPAMINE HYDROCHLORIDE IN DEXTROSE 5 MCG/KG/MIN: 1.6 INJECTION, SOLUTION INTRAVENOUS at 10:00

## 2017-02-27 RX ADMIN — DEXTROSE MONOHYDRATE: 25 INJECTION, SOLUTION INTRAVENOUS at 08:00

## 2017-02-27 RX ADMIN — ENOXAPARIN SODIUM 40 MG: 40 INJECTION SUBCUTANEOUS at 05:37

## 2017-02-27 RX ADMIN — GLUCAGON HYDROCHLORIDE 5 MG: KIT at 00:23

## 2017-02-27 RX ADMIN — DEXTROSE MONOHYDRATE AND SODIUM CHLORIDE 150 ML/HR: 5; .9 INJECTION, SOLUTION INTRAVENOUS at 09:00

## 2017-02-27 RX ADMIN — Medication 10 ML: at 23:50

## 2017-02-27 RX ADMIN — CALCIUM GLUCONATE 3.5 G: 94 INJECTION, SOLUTION INTRAVENOUS at 00:02

## 2017-02-27 RX ADMIN — I.V. FAT EMULSION 1000 ML: 20 EMULSION INTRAVENOUS at 13:14

## 2017-02-27 RX ADMIN — GLUCAGON HYDROCHLORIDE 4 MG/HR: KIT at 02:03

## 2017-02-27 RX ADMIN — GLUCAGON HYDROCHLORIDE 5 MG/HR: KIT at 08:18

## 2017-02-27 RX ADMIN — NITROFURANTOIN (MONOHYDRATE/MACROCRYSTALS) 100 MG: 75; 25 CAPSULE ORAL at 17:49

## 2017-02-27 RX ADMIN — Medication 10 ML: at 05:38

## 2017-02-27 RX ADMIN — Medication 10 ML: at 14:00

## 2017-02-27 RX ADMIN — MAGNESIUM SULFATE HEPTAHYDRATE 1 G: 1 INJECTION, SOLUTION INTRAVENOUS at 12:19

## 2017-02-27 RX ADMIN — SODIUM CHLORIDE 150 ML/HR: 900 INJECTION, SOLUTION INTRAVENOUS at 02:04

## 2017-02-27 NOTE — ED NOTES
Vitals:  Patient Vitals for the past 12 hrs:   Temp Pulse Resp BP SpO2   02/27/17 0730 - 68 16 91/44 97 %   02/27/17 0700 - 68 14 97/50 98 %   02/27/17 0635 - 82 21 108/50 99 %   02/27/17 0630 - 78 13 - 97 %   02/27/17 0615 - 72 15 - 98 %   02/27/17 0610 - 71 13 97/42 98 %   02/27/17 0600 - 70 12 91/42 98 %   02/27/17 0545 - 78 13 - 99 %   02/27/17 0530 - 73 13 - 99 %   02/27/17 0515 - 73 15 - 98 %   02/27/17 0500 - 75 16 - 99 %   02/27/17 0450 98.9 °F (37.2 °C) 69 14 107/62 100 %   02/27/17 0400 - 74 13 107/56 98 %   02/27/17 0330 - 71 14 99/48 98 %   02/27/17 0300 - 72 17 103/42 97 %   02/27/17 0250 - 76 15 111/49 98 %   02/27/17 0240 - 74 15 111/57 99 %   02/27/17 0230 - 73 20 111/50 99 %   02/27/17 0220 - 75 15 105/55 99 %   02/27/17 0210 - 68 16 104/44 99 %   02/27/17 0200 - 65 15 100/48 99 %   02/27/17 0150 - 66 12 105/55 100 %   02/27/17 0140 - 68 19 99/49 99 %   02/27/17 0130 - 65 20 98/50 99 %   02/27/17 0120 - 66 16 100/47 99 %   02/27/17 0110 - 64 14 98/46 99 %   02/27/17 0100 - 66 15 96/46 99 %   02/27/17 0050 - 73 19 95/52 99 %   02/27/17 0040 - 73 15 (!) 85/42 99 %   02/27/17 0030 - 81 12 98/64 97 %   02/27/17 0020 - 67 14 (!) 81/38 97 %   02/27/17 0010 - 68 13 (!) 76/37 97 %   02/26/17 2350 - 81 17 98/69 99 %   02/26/17 2340 - 67 16 (!) 78/39 98 %   02/26/17 2245 - 68 12 (!) 88/46 99 %   02/26/17 2230 - 67 20 93/49 98 %   02/26/17 2215 - 68 12 95/47 98 %   02/26/17 2200 - 68 15 97/54 98 %   02/26/17 2130 - 63 14 95/58 95 %   02/26/17 2115 - 63 13 (!) 89/51 95 %   02/26/17 2100 - 67 15 93/52 96 %   02/26/17 2045 - 65 (!) 0 99/49 96 %   02/26/17 2030 - 68 13 (!) 87/46 97 %     Medications ordered:   Medications   nitrofurantoin (macrocrystal-monohydrate) (MACROBID) capsule 100 mg (100 mg Oral Given 2/26/17 4016)   glucagon (GLUCAGEN) 1 mg injection (not administered)   glucagon (GLUCAGEN) 1 mg injection (  Canceled Entry 2/27/17 0030)   glucagon (GLUCAGEN) 1 mg injection (  Canceled Entry 2/27/17 0044) glucagon (GLUCAGEN) 1 mg injection (  Canceled Entry 2/27/17 0044)   glucagon (GLUCAGEN) 1 mg injection (  Canceled Entry 2/27/17 0048)   glucagon (GLUCAGEN) 0.04 mg/mL in dextrose 5% 250 mL infusion (4 mg/hr IntraVENous New Bag 2/27/17 0203)   enoxaparin (LOVENOX) injection 40 mg (not administered)   0.9% sodium chloride infusion (150 mL/hr IntraVENous New Bag 2/27/17 0204)   ondansetron (ZOFRAN ODT) tablet 4 mg (4 mg Oral Given 2/26/17 1847)   ondansetron (ZOFRAN) injection 4 mg (4 mg IntraVENous Given 2/26/17 2011)   sodium chloride 0.9 % bolus infusion 1,000 mL (0 mL IntraVENous IV Completed 2/26/17 2118)   sodium chloride 0.9 % bolus infusion 1,000 mL (0 mL IntraVENous IV Completed 2/26/17 2253)   sodium chloride 0.9 % bolus infusion 1,000 mL (1,000 mL IntraVENous New Bag 2/26/17 2256)   glucagon (GLUCAGEN) injection 5 mg (5 mg IntraVENous Given 2/26/17 2343)   calcium gluconate injection 3.5 g (3.5 g IntraVENous Given 2/27/17 0002)   glucagon (GLUCAGEN) injection 5 mg (5 mg IntraVENous Given 2/27/17 0023)   ondansetron (ZOFRAN) injection 4 mg (4 mg IntraVENous Given 2/27/17 0028)       Lab findings:  Recent Results (from the past 12 hour(s))   ACETAMINOPHEN    Collection Time: 02/26/17  9:00 PM   Result Value Ref Range    ACETAMINOPHEN <2 (L) 10 - 30 ug/mL   EKG, 12 LEAD, INITIAL    Collection Time: 02/27/17  5:40 AM   Result Value Ref Range    Ventricular Rate 72 BPM    Atrial Rate 72 BPM    P-R Interval 198 ms    QRS Duration 92 ms    Q-T Interval 440 ms    QTC Calculation (Bezet) 481 ms    Calculated P Axis 63 degrees    Calculated R Axis 84 degrees    Calculated T Axis 55 degrees    Diagnosis       Normal sinus rhythm  Prolonged QT  Abnormal ECG  When compared with ECG of 26-FEB-2017 16:39,  Nonspecific T wave abnormality no longer evident in Anterolateral leads  QT has lengthened     GLUCOSE, POC    Collection Time: 02/27/17  7:48 AM   Result Value Ref Range    Glucose (POC) 48 (L) 70 - 110 mg/dL 7:12 PM Consulted with Dr. Elyssa Vaughn, ED attending concerning patient Mari Rizzo, standard discussion of reason for visit, HPI, ROS, PE, and current results available. Report was given at this time. Provider Ashley Torres,  will assume care at his time. Tylenol level recheck is due at 8 PM. BP has been stable since in ED. Pending medical clearance for psychiatric evaluation. 7:32 PM I introduced myself to pt. She is in no distress. BP stable at 101/57, HR 74, MAP 67. Will monitor. 8:57 PM Per RN, patient is nauseous and had 1 vomiting episode. Gave patient Zofran and IV fluids. Awaiting  Acetaminophen levels. 9:40 PM Patient is resting. She is no apparent distress. She states that her nausea is better after Zofran. Her BP had dropped some but improved and is now 95/58 after I gave emergent IVF's. HR is 64. MAP is 66.     11:02 PM Patient's SBP is still in the 90's after 2 L of fluid. Poison  has called back and states that the patient is not medically cleared yet. Continue with IV fluids and they state that they will call back. If worse BP, will proceed with giving other medication such as Glucagon or Calcium. Will be determined after I speak with Poison Control again. 11:34 PM Patient's SBP is now 84 despite 3 L of fluid. Gave a dosage of Glucagon. Discussed severity of this type of overdose with mother and family at bedside. Patient is A&O X 3. HR has been stable in the 60's and 70's. Will contact poison control. 11:44 PM Consult: Discussed care with [place] ([facility]). Standard discussion; including history of patients chief complaint, available diagnostic results, and treatment course. She agrees with Glucagon. States that I can give Glucagon X 2 doses if needed. Recommends giving Calcium gluconate now. 3-6 grams. If no response, I can give another dosage for 3-6 grams of Calcium gluconate. Again if no response.  Start IV infusion of either Calcium glucagon at 2-5 mg per hour or Calcium gluconate at 20-50 mL per kilogram per hour. Recommends ICU admission. 11:47 PM Patient's SBP is now 78. Will prepare patient for a central line. Mother at bedside. I Explained that patient is taking a turn for the worst and will need to go to ICU and she is in very serious condition. Mom is distraught but understands. Patient agrees with central line. She is AOx3 and with very good mentation. 11:52 PM Consult: Discussed care with Dr. Angelal Medina (ICU). Standard discussion; including history of patients chief complaint, available diagnostic results, and treatment course. Agrees with admission to ICU. He wants reconfirmation from Poison control regarding Calcium treatment. 12:00 AM Consult: Discussed care with POISON CONTROL. Standard discussion; including history of patients chief complaint, available diagnostic results, and treatment course. Confirmed with poison control that yes, give the patient Calcium gluconate for this Beta Blocker overdose. Proceed as instructed. 12:23 AM Patient initially had a good response with Glucagon and Calcium gluconate, but BP has dropped again. Will give a second dose of Glucagon. 12:42 AM Consult: Discussed care with Dr. Makenzie Caballero (HOSPITALIST). Standard discussion; including history of patients chief complaint, available diagnostic results, and treatment course. Agrees with admission. 12:58 AM Consult: [facility]ed care with [facility]. Standard discussion; including history of patients chief complaint, available diagnostic results, and treatment course. Stated that we only have a small amount of Glucagon at [facility]. We have enough to start 4 mg per hour and pharmacist is going to send more here via [facility]. Critical care time:   I have spent 90 minutes of critical care time involved in lab review, consultations with specialist, family decision making, and documentation.  During this entire length of time I was immediately available to the patient. Critical care: The reason for providing this level of medical care for this critically ill patient was due to a critical illness that impaired one or more vital organ systems such that there was a high probability of imminent or life threatening deterioration in the patients condition. This care involved high complexity decision making to assess, manipulate and support vital system functions, to treat this degree vital organ system failure and to prevent further life threatening deterioration of the patient's condition. CENTRAL LINE PROCEDURE :  Performed by Dr. Justin Flaherty  Time: 12:45 AM  Consent: written and emergent  Consent given by: patient  Risks discussed: bleeding, arterial puncture, nerve damage, lung collapse, and infection    Alternatives discussed: no treatment    Universal protocol:  Procedure explained and questions answered to patient or proxy's satisfaction: yes  Patient identiy confirmed: verbally with patient    Preprocedure details:  Hand hygiene perfrormed prior to insertion  All elements of maximal sterile technique followed  Timeout called prior to start of procedure  Skin preparation:   Skin preparation agent: skin preparation agent completely dried prior to procedure: yes    Anesthesia:  Anesthesia method: local infiltration  Local anesthetic: lidocaine 1% w/o epi    Location: R IJ    Patient position: reverse Trendelenburg  Procedural supplies: triple lumen  Catheter size: 3.5  Landmarks idenfied: yes  US guided: yes  Number of attempts: 2  Sucessful placement: yes    Post-procedure details  Post-procedures: dresseing applied and lined sutured  Assesment: blood returnd through all ports, free fluid flow, placement verified on x-ray, no pneumothorax on xray  Patient tolerance of procedure: tolerated well, no immediate complications    X-Ray, CT or other radiology findings or impressions:  XR CHEST PORT    IMPRESSION:  Central line is in good placement. No pneumothorax. Lungs are otherwise clear. Per Suzan Shetty DO 2:34 AM         SCRIBE ATTESTATION STATEMENT  Documented by: Brent Abraham scribing for, and in the presence of, Suzan Shetty DO 7:12 PM    Signed by: Nelia Zapata, 02/26/17 7:12 PM    PROVIDER ATTESTATION STATEMENT  I personally performed the services described in the documentation, reviewed the documentation, as recorded by the scribe in my presence, and it accurately and completely records my words and actions.   Suzan Shetty DO

## 2017-02-27 NOTE — ACP (ADVANCE CARE PLANNING)
Patient has designated ________________________ to participate in his/her discharge plan and to receive any needed information. Name:   Address:  Phone number:    Pt unable to designate as lethargic and unable to answer questions. Person at bedside indicates they are legally . Unable to confirm with pt.   Blanco Nicholas [phone]    Patient's mother listed on face sheet Melony Cuello [phone]

## 2017-02-27 NOTE — PROGRESS NOTES
[address] [place] [address]  Hospitalist Division        Inpatient Daily Progress Note    Daily progress Note    Patient: Mika Griffin MRN: [id number]  CSN: [id number]    YOB: 1990  Age: 23 y.o. Sex: female    DOA: 2/26/2017 LOS:  LOS: 0 days                    Chief Complaint:  Suicide attempt       Subjective:      Resting in bed. Denies SI. Mother and sitter at bedside. In NAD. Objective:      Visit Vitals    /64    Pulse 69    Temp 97.4 °F (36.3 °C)    Resp 12    Ht 5' 5\" (1.651 m)    Wt 121.7 kg (268 lb 4.8 oz)    SpO2 97%    Breastfeeding Unknown    BMI 44.65 kg/m2       Physical Exam:  General appearance: drowsy, cooperative, no distress, appears stated age  Head: Normocephalic, without obvious abnormality, atraumatic  Lungs: clear to auscultation bilaterally  Heart: regular rate and rhythm, S1, S2 normal, no murmur, click, rub or gallop  Abdomen: soft, non tender, non distended.  Normoactive bowel sounds  Extremities: extremities normal, atraumatic, no cyanosis or edema  Skin: Skin color, texture, turgor normal. No rashes or lesions  Neurologic: Grossly normal          Intake and Output:  Current Shift:  02/27 0701 - 02/27 1900  In: 1365.3 [I.V.:1365.3]  Out: 2300 [Urine:2300]  Last three shifts:  02/25 1901 - 02/27 0700  In: 1235 [I.V.:1235]  Out: 650 [Urine:650]    Recent Results (from the past 24 hour(s))   URINALYSIS W/ RFLX MICROSCOPIC    Collection Time: 02/26/17  5:57 PM   Result Value Ref Range    Color YELLOW      Appearance CLEAR      Specific gravity >1.030 (H) 1.005 - 1.030    pH (UA) 5.5 5.0 - 8.0      Protein NEGATIVE  NEG mg/dL    Glucose NEGATIVE  NEG mg/dL    Ketone NEGATIVE  NEG mg/dL    Bilirubin NEGATIVE  NEG      Blood NEGATIVE  NEG      Urobilinogen 0.2 0.2 - 1.0 EU/dL    Nitrites NEGATIVE  NEG      Leukocyte Esterase SMALL (A) NEG     DRUG SCREEN, URINE    Collection Time: 02/26/17  5:57 PM   Result Value Ref Range BENZODIAZEPINE NEGATIVE  NEG      BARBITURATES NEGATIVE  NEG      THC (TH-CANNABINOL) NEGATIVE  NEG      OPIATES POSITIVE (A) NEG      PCP(PHENCYCLIDINE) NEGATIVE  NEG      COCAINE NEGATIVE  NEG      AMPHETAMINE POSITIVE (A) NEG      METHADONE NEGATIVE       HDSCOM (NOTE)    HCG URINE, QL    Collection Time: 02/26/17  5:57 PM   Result Value Ref Range    HCG urine, Ql. NEGATIVE  NEG     URINE MICROSCOPIC ONLY    Collection Time: 02/26/17  5:57 PM   Result Value Ref Range    WBC 21 to 35 0 - 4 /hpf    RBC NONE 0 - 5 /hpf    Epithelial cells 2+ 0 - 5 /lpf    Bacteria 1+ (A) NEG /hpf   ACETAMINOPHEN    Collection Time: 02/26/17  9:00 PM   Result Value Ref Range    ACETAMINOPHEN <2 (L) 10 - 30 ug/mL   CBC W/O DIFF    Collection Time: 02/27/17  5:30 AM   Result Value Ref Range    WBC 9.8 4.6 - 13.2 K/uL    RBC 3.18 (L) 4.20 - 5.30 M/uL    HGB 8.9 (L) 12.0 - 16.0 g/dL    HCT 28.9 (L) 35.0 - 45.0 %    MCV 90.9 74.0 - 97.0 FL    MCH 28.0 24.0 - 34.0 PG    MCHC 30.8 (L) 31.0 - 37.0 g/dL    RDW 16.5 (H) 11.6 - 14.5 %    PLATELET 975 059 - 229 K/uL    MPV 11.9 (H) 9.2 - 11.8 FL   PTT    Collection Time: 02/27/17  5:30 AM   Result Value Ref Range    aPTT 31.8 23.0 - 36.4 SEC   CALCIUM, IONIZED    Collection Time: 02/27/17  5:30 AM   Result Value Ref Range    Ionized Calcium 1.28 1.12 - 1.32 MMOL/L   MAGNESIUM    Collection Time: 02/27/17  5:30 AM   Result Value Ref Range    Magnesium 1.7 (L) 1.8 - 2.4 mg/dL   METABOLIC PANEL, BASIC    Collection Time: 02/27/17  5:30 AM   Result Value Ref Range    Sodium 143 136 - 145 mmol/L    Potassium 3.5 3.5 - 5.5 mmol/L    Chloride 110 (H) 100 - 108 mmol/L    CO2 19 (L) 21 - 32 mmol/L    Anion gap 14 3.0 - 18 mmol/L    Glucose 107 (H) 74 - 99 mg/dL    BUN 17 7.0 - 18 MG/DL    Creatinine 1.00 0.6 - 1.3 MG/DL    BUN/Creatinine ratio 17 12 - 20      GFR est AA >60 >60 ml/min/1.73m2    GFR est non-AA >60 >60 ml/min/1.73m2    Calcium 8.5 8.5 - 10.1 MG/DL   TSH 3RD GENERATION    Collection Time: 02/27/17  5:30 AM   Result Value Ref Range    TSH >100.00 (H) 0.36 - 3.74 uIU/mL   EKG, 12 LEAD, INITIAL    Collection Time: 02/27/17  5:40 AM   Result Value Ref Range    Ventricular Rate 72 BPM    Atrial Rate 72 BPM    P-R Interval 198 ms    QRS Duration 92 ms    Q-T Interval 440 ms    QTC Calculation (Bezet) 481 ms    Calculated P Axis 63 degrees    Calculated R Axis 84 degrees    Calculated T Axis 55 degrees    Diagnosis       Normal sinus rhythm  Prolonged QT  Abnormal ECG  Confirmed by Muna Murray ([id number]) on 2/27/2017 10:40:37 AM     GLUCOSE, POC    Collection Time: 02/27/17  7:48 AM   Result Value Ref Range    Glucose (POC) 48 (L) 70 - 110 mg/dL   GLUCOSE, POC    Collection Time: 02/27/17  8:13 AM   Result Value Ref Range    Glucose (POC) 139 (H) 70 - 110 mg/dL   GLUCOSE, POC    Collection Time: 02/27/17  9:05 AM   Result Value Ref Range    Glucose (POC) 87 70 - 110 mg/dL   GLUCOSE, POC    Collection Time: 02/27/17  9:56 AM   Result Value Ref Range    Glucose (POC) 155 (H) 70 - 110 mg/dL   GLUCOSE, POC    Collection Time: 02/27/17 11:08 AM   Result Value Ref Range    Glucose (POC) 154 (H) 70 - 110 mg/dL   GLUCOSE, POC    Collection Time: 02/27/17 12:04 PM   Result Value Ref Range    Glucose (POC) 170 (H) 70 - 110 mg/dL   GLUCOSE, POC    Collection Time: 02/27/17  3:25 PM   Result Value Ref Range    Glucose (POC) 219 (H) 70 - 110 mg/dL           Lab Results   Component Value Date/Time    Glucose 107 02/27/2017 05:30 AM    Glucose 97 02/26/2017 04:10 PM    Glucose 88 04/04/2016 01:20 PM    Glucose 91 03/11/2016 09:00 PM    Glucose 105 10/06/2015 03:05 PM        Assessment/Plan:     Patient Active Problem List   Diagnosis Code    THYROID DYSFUNC-ANTEPART O99.280, E07.9    HABITUAL ABORTER ANTEPARTUM COMPLICATION U18.00    EPILEPSY COMPLIC ANTEPARTUM     Dysmenorrhea N94.6    Migraine G43.909    Anxiety F41.9    [facility] G85.5P8T       A/P:  Intentional overdose by beta blocker  Hypotension 2/2 above: continue Dopamine. PCCM consulted/following  Electrolyte replacement per ICU protocol  UTI: Macrobid   Hypoglycemia   HX Hypothyroidism   Hx HTN  DVT prophylaxis: Lovenox   Psych consult once medically stable     Morbid obesity with bmi [id number] [facility],[address], [facility]  [email or web address]  Pager:  [phone]  Office:  [phone]    I examined the patient , reviewed the history, labs, and radiology reports  independently. I have reviewed the NP documentation, agree with the documentation, medical decision making and treatment plan as outlined by my NP.   Pt is alert and awake, NAD  Cont monitoring HR and BP     Beatris Dia MD

## 2017-02-27 NOTE — PROGRESS NOTES
0450-received pt from ER  0530-AM labs drawn, EKG completed  0550-sitter not available at this time  0604-poison control updated on pt status, recommend calcium chloride 1-2 grams IV via central line for persistent hypotension  0615-Joshua, sitter to bedside, report given as to patient status, safety precautions and suicide observations, called lab to check status of AM labs, awaiting results  0700  Bedside and Verbal shift change report given to Aditya Donis RN (oncoming nurse) by Clarice Ganser, RN   (offgoing nurse). Report included the following information MAR, Accordion, Recent Results, Med Rec Status, Cardiac Rhythm SR prolonged QT and Alarm Parameters .    0755-BG 48, 1 amp D50 given, page to Dr Brandon Haro  0897-Dr Brandon Haro aware, new orders received, Roslyn AVENDAÑO updated

## 2017-02-27 NOTE — CDMP QUERY
Please document if you agree that this pt's meets criteria for Morbid obesity     =>Morbid obesity with bmi 44   =>Other Explanation of clinical findings  =>Unable to Determine (no explanation of clinical findings)    The medical record reflects the following clinical findings, treatment, and risk factors:  23 yof admitted with suicide attempt   bmi 44,     Please clarify and document your clinical opinion in the progress notes and discharge summary including the definitive and/or presumptive diagnosis, (suspected or probable), related to the above clinical findings. Please include clinical findings supporting your diagnosis. If you DECLINE this query or would like to communicate with [facility], please utilize the \"[employer] message box\" at the TOP of the Progress Note on the right.       Thank you,    Laura Medrano [facility], [address] [place] [address]

## 2017-02-27 NOTE — PROGRESS NOTES
Patient is unable to communicate at this time or any of the other 3 attempts today to see here due to her current condition.  offered prayer . No family seen at any of the attempted visits. Chaplains will continue to follow and will provide pastoral care on an as needed/requested basis.     Ruthie [place]   Spiritual Care   [phone]

## 2017-02-27 NOTE — H&P
Cony JIANG    Name:  Sera Baird  MR#:  [id number]  :  1990  Account #:  [de-identified]  Date of Adm:  2017      CHIEF COMPLAINT: Suicide attempt by beta blockers. HISTORY OF PRESENT ILLNESS: The patient is a 61-year-old  female who attempted suicide earlier in the evening secondary to  depression. The patient took a handful of beta blockers, uncertain how  much, in an attempt to end her life. Her mother is at the bedside, who  states that this happened once before, when the patient was 13,  however it was not as severe. The patient is 3 months postpartum and,  according to her mom, expressed about 1 month ago that she was  feeling depressed. She is not currently on any medication for  depression. While in the ED, the patient presented with stable labs. However, at some point during her stay in the ER, her vitals  became unstable. The patient's blood pressure dropped into the  70s/30s. A central line had to be placed, and the patient was placed  beta blocker antagonist. She will be admitted to the ICU for further  evaluation. The patient is watched by a sitter. Deangelo Meehan is a 23 y.o. female who     Past Medical History:   Diagnosis Date    Endocrine disease Hypothyroidism    Hypertension     Seizures ([facility] [address]     epilepsy    Thyroid disease        Past Surgical History:   Procedure Laterality Date    HX TONSILLECTOMY      Age 7       Social History     Social History    Marital status: SINGLE     Spouse name: N/A    Number of children: N/A    Years of education: N/A     Occupational History    Not on file.      Social History Main Topics    Smoking status: Former Smoker     Packs/day: 0.50     Years: 5.00    Smokeless tobacco: Never Used    Alcohol use 0.0 oz/week     0 Standard drinks or equivalent per week      Comment: sometimes    Drug use: No      Comment: pt denies    Sexual activity: Yes     Partners: Male     Birth control/ protection: None     Other Topics Concern    Not on file     Social History Narrative       Family History   Problem Relation Age of Onset    Diabetes Other      great grandmother    Hypertension Other      family history    Thyroid Disease Other      great grandmother       No Known Allergies    Review of Systems:  Positive in bold. CONST: Fever, weight loss, fatigue or chills  GI: Nausea, vomiting, abdominal pain, change in bowel habits, hematochezia, melena, and GERD   INTEG: Dermatitis, abnormal moles  HEENT: Recent changes in vision, vertigo, epistaxis, dysphagia, hoarseness  CV: Chest pain, palpitations, HTN, edema and varicosities  RESP: Cough, shortness of breath, wheezing, hemoptysis, snoring. : Hematuria, dysuria, frequency, urgency, retention, incontinence   MS: Weakness, joint pain and arthritis  ENDO: Diabetes, thyroid disease, polyuria, polydipsia, polyphagia, poor wound healing, heat intolerance, cold intolerance  LYMPH/HEME: Anemia, bruising and history of blood transfusions  NEURO: Dizziness, headache, fainting, seizures and stroke  PSYCH: Anxiety , depression    Physical Exam:      Visit Vitals    /48    Pulse 65    Temp 98.4 °F (36.9 °C)    Resp 15    Ht 5' 2\" (1.575 m)    Wt 111.1 kg (245 lb)    SpO2 99%    Breastfeeding Unknown    BMI 44.81 kg/m2       Physical Exam:  Gen:  No distress, alert, awake and oriented x 4  HEENT:  Normal cephalic atraumatic, extra-occular movements are intact. Neck:  Supple, No JVD  Lungs:  Clear bilaterally, no wheeze, no rales, normal effort  Cardiovascular:  Regular Rate and Rhythm, normal S1 and S2, no audible murmur. Capillary refill: < 3 seconds. Abdomen:  Soft, non tender, non distended, normal bowel sounds, no guarding. Extremities:  Well perfused, no cyanosis, no edema  Neurological:  Awake and alert, CN's are intact, normal strength throughout extremities  Skin:  No rashes or moles.   Turgor and color normal  Mental Status: Normal thought process, does not appear anxious      Laboratory Studies: All lab results for the last 24 hours reviewed. Assessment/Plan     Active Problems:    Suicide attempt by beta blocker overdose ([facility] [address] (2/27/2017)        PLAN:  25yo female admitted for suicide attempt with beta blocker overdose. Patient is currently stable. Central line in place   Admit to ICU- monitor blood pressure   Oxygen via nasal canula   DVT prophylaxis - Lovenox 40 mg SQ daily   FULL CODE   Fall precautions   Ambulate with assistance. Monitor intake and output   Monitor vitals as per unit   Neurovascular checks   Replace electrolytes as needed. Follow up am labs.    1:1 sitter   Psych consultation            Denise Castro MD    AR / FS  D:  02/27/2017   03:28  T:  02/27/2017   03:58  Job #:  [id number]

## 2017-02-27 NOTE — CONSULTS
[facility]  Pulmonary, Critical Care, and Sleep Medicine    Name: Barbara Moy MRN: [id number]   : 1990 Hospital: [facility]   Date: 2017        PCCM Initial Patient Consult                                              Consult requesting physician: Dr. Lety Lindsay  Reason for Consult: Intentional OD    [x] I have reviewed the flowsheet and previous days notes. Events, vitals, medications and notes from last 24 hours reviewed. Care plan discussed with staff, patient, family and on multidisciplinary rounds. IMPRESSION:   · Overdose, intentional/SA with labetalol and ibuprofen, on glucagon gtt  · Hypotension due to above, on dopamine  · Hypomagnesemia, being replaced  · Hypoglycemia, improved with D50, on D5  · +UTI  · UDS +amphetamines, opiates  · Postpartum 3 mos  · Hx HTN  · Hx hypothyroidism  · Hx depression, SI    · Code status: FULL      RECOMMENDATIONS:   · CVS: Monitor HD. Posion control following. HR stable 60-70's. Currently on dopamine, add levophed prn for MAP>65. R IJ in place. · Respiratory: Currently stable on RA. O2nc prn for O2 sat>94%. Aspiration precautions. CXR without acute process. · ID:  On macrobid for +UA. Await urine cx. Trend temps, WBC  · Hematology/Oncology:  Recheck CBC, hgb low 8.9 today. · Renal:  Monitor BUN, Cr. Cont IVF  · Endocrine:  Check TSH. Avoid hypoglycemia, on D5IVF  · Neurology/Pain/Sedation:  Psych consult when stable. Sitter at bedside. Suicide precautions. Poison control following closely. Cont glucagon gtt. Calcium chloride prn. Started on lipids. UDS +amphetamines and opioids  · FEN: NPO for now. IVF. Replace lytes per ICU protocol  ·  PPX: lovenox                                                             ·   ·   · Glycemic Control. Glucose stabilizer per ICU protocol when on insulin drip. Maintain blood glucose 140-180.    · Replace electrolytes per ICU electrolyte replacement protocol  · Ventilator bundle & Sedation protocol followed. Daily sedation holiday, assessment for readiness for SBT and then re-titrate if required. Chlorhexidine mouth washes. · HOB 30 degree elevation all the time. Aggressive pulmonary toileting. Incentive spirometry when appropriate. Aspiration precautions. · Sepsis bundle and protocol followed. Follow serial lactic acid, frequent BMP check, fluid resuscitation. Follow cultures. Deescalate antibiotic when appropriate. · Rogers bundle followed, remove rogers catheter when not critically ill. · Central Line bundle followed, remove when not needed. · Skin & Wound care. · Weekly prealbumin, nutritional consult. · PT/OT eval and treat. OOB when appropriate. · Further recommendations will be based on the patient's response to recommended treatment and results of the investigation ordered. · Quality Care: PPI, DVT prophylaxis, HOB elevated, Infection control all reviewed and addressed. · Events and notes from last 24 hours reviewed. Care plan discussed with nursing   · CC Time: >35 min      Subjective/History of Present Illness:     Jesus Vitale has been seen and evaluated in ICU Bed 2707. Patient is a 23 y.o. female with PMHx significant for HTN, thyroid disease, seizure, recently postpartum 3 months ago who presented to the ED with SI after taking labetalol (56 tabs) and ibuprofen (\"1 bottle\"). She was hypotensive in the ED and posion control was contacted. UDS +amphetamines and opiates. UA +. She was started on glucagon and calcium chloride as well as glucogon gtt. R IJ was placed. Pt was transferred to ICU for further evaluation and management. Ms Chucho Levine currently c/o neck discomfort at R IJ site, denies lightheadedness, dizziness. No SOB, CP/palpitations. HR 60-70's. She became more hypotensive and was started on dopamine. Additionally, her glucose was noted to be 48 this am and she required D50.       No Known Allergies   Past Medical History:   Diagnosis Date    Endocrine disease Hypothyroidism    Hypertension     Seizures ([facility] [address]     epilepsy    Thyroid disease       Past Surgical History:   Procedure Laterality Date    HX TONSILLECTOMY      Age 7      Social History   Substance Use Topics    Smoking status: Former Smoker     Packs/day: 0.50     Years: 5.00    Smokeless tobacco: Never Used    Alcohol use 0.0 oz/week     0 Standard drinks or equivalent per week      Comment: sometimes      Family History   Problem Relation Age of Onset    Diabetes Other      great grandmother    Hypertension Other      family history    Thyroid Disease Other      great grandmother      Prior to Admission medications    Medication Sig Start Date End Date Taking? Authorizing Provider   levothyroxine (SYNTHROID) 300 mcg tablet Take 1 Tab by mouth Daily (before breakfast). 6/23/16  Yes Ekta Martinez MD   labetalol (NORMODYNE) 100 mg tablet 100 mg. 4/30/16  Yes Historical Provider   PNV no.24-iron-folic acid-dha -036 mg-mcg-mg cmpk Take  by mouth. Myles Vanegas MD   levothyroxine (SYNTHROID) 25 mcg tablet Take 1 Tab by mouth Daily (before breakfast). 6/23/16   Ekta Martinez MD     Current Facility-Administered Medications   Medication Dose Route Frequency    glucagon (GLUCAGEN) 0.04 mg/mL in dextrose 5% 250 mL infusion  5 mg/hr IntraVENous CONTINUOUS    enoxaparin (LOVENOX) injection 40 mg  40 mg SubCUTAneous Q24H    sodium chloride (NS) flush 5-10 mL  5-10 mL IntraVENous Q8H    dextrose 5% and 0.9% NaCl infusion  150 mL/hr IntraVENous CONTINUOUS    dextrose (D50W) 50% injection syrg        calcium gluconate 100 mg/mL (10%) injection        DOPamine (INTROPIN) 1,600 mcg/mL infusion (STANDARD)  0-10 mcg/kg/min IntraVENous TITRATE    fat emulsion 20% (LIPOSYN, INTRALIPID) infusion 1,000 mL  1,000 mL IntraVENous CONTINUOUS    nitrofurantoin (macrocrystal-monohydrate) (MACROBID) capsule 100 mg  100 mg Oral BID       Lines: All central lines examined by me.  No signs of erythema, induration, discharge. Central Venous Catheter:  Triple Lumen Central line 02/27/17 Right Internal jugular (Active)   Central Line Being Utilized Yes 2/27/2017  5:00 AM   Criteria for Appropriate Use Hemodynamically unstable, requiring monitoring lines, vasopressors, or volume resuscitation 2/27/2017  5:00 AM   Site Assessment Clean, dry, & intact 2/27/2017  7:00 AM   Infiltration Assessment 0 2/27/2017  7:00 AM   Affected Extremity/Extremities Color distal to insertion site pink (or appropriate for race); Pulses palpable;Range of motion performed 2/27/2017  7:00 AM   Date of Last Dressing Change 02/27/17 2/27/2017  5:00 AM   Dressing Status Clean, dry, & intact 2/27/2017  7:00 AM   Dressing Type Transparent;Tape;Disk with Chlorhexadine Gluconate (CHG) 2/27/2017  5:00 AM   Action Taken Open ports on tubing capped 2/27/2017  5:00 AM   Proximal Hub Color/Line Status White 2/27/2017  5:00 AM   Positive Blood Return (Medial Site) Yes 2/27/2017  5:00 AM   Medial Hub Color/Line Status Blue 2/27/2017  5:00 AM   Positive Blood Return (Lateral Site) Yes 2/27/2017  5:00 AM   Distal Hub Color/Line Status Brown 2/27/2017  5:00 AM   Positive Blood Return (Site #3) Yes 2/27/2017  5:00 AM   External Catheter Length (cm) 2 centimeters 2/27/2017  5:00 AM   Alcohol Cap Used Yes 2/27/2017  5:00 AM        Peripheral Intravenous Line:  Peripheral IV 02/26/17 Right Forearm (Active)   Site Assessment Clean, dry, & intact 2/27/2017  7:00 AM   Phlebitis Assessment 0 2/27/2017  7:00 AM   Infiltration Assessment 0 2/27/2017  7:00 AM   Dressing Status Clean, dry, & intact 2/27/2017  7:00 AM   Dressing Type Transparent;Tape 2/27/2017  5:00 AM   Hub Color/Line Status Green;Flushed;Capped 2/27/2017  5:00 AM   Action Taken Open ports on tubing capped 2/27/2017  5:00 AM   Alcohol Cap Used Yes 2/27/2017  5:00 AM            Telemetry:normal sinus rhythm    Objective:   Vital Signs:    Visit Vitals    BP (!) 78/41    Pulse 63    Temp 98.3 °F (36.8 °C)    Resp 17    Ht 5' 5\" (1.651 m)    Wt 121.7 kg (268 lb 4.8 oz)    LMP 2017    SpO2 96%    Breastfeeding Unknown    BMI 44.65 kg/m2       O2 Device: Room air       Temp (24hrs), Av.3 °F (36.8 °C), Min:97.4 °F (36.3 °C), Max:98.9 °F (37.2 °C)       Intake/Output:   Last shift:       07 - 1900  In: -   Out: 600 [Urine:600]    Last 3 shifts:  190 -  0700  In: 7248 [I.V.:1235]  Out: 650 [Urine:650]      Intake/Output Summary (Last 24 hours) at 17 1343  Last data filed at 17 1254   Gross per 24 hour   Intake             1235 ml   Output             1250 ml   Net              -15 ml       Last 3 Recorded Weights in this Encounter    17 1626 17 0450   Weight: 111.1 kg (245 lb) 121.7 kg (268 lb 4.8 oz)         Physical Exam:     General/Neurology: Alert, Awake, NAD, FC, oriented, moves all extremities, no FND  Head:   Normocephalic, without obvious abnormality, atraumatic. Eye:   PERRL, no scleral icterus, no pallor, no cyanosis  Nose:   No sinus tenderness, no erythema  Throat:  Lips, mucosa, and tongue normal.   Neck:   Supple, R IJ  Lung:   Adequate air entry bilateral equal, no rales, no rhonchi, no wheezing. No prolonged expiration. Heart:   Regular, no murmur  Abdomen:  Soft, NT, ND, +BS, +obese  Extremities:  No pedal edema, no cyanosis, no clubbing  Pulses: 2+ and symmetric in DP.    Skin:   Color, texture, turgor normal.      Data:       Recent Results (from the past 24 hour(s))   CBC WITH AUTOMATED DIFF    Collection Time: 17  4:10 PM   Result Value Ref Range    WBC 8.2 4.6 - 13.2 K/uL    RBC 4.17 (L) 4.20 - 5.30 M/uL    HGB 11.7 (L) 12.0 - 16.0 g/dL    HCT 37.2 35.0 - 45.0 %    MCV 89.2 74.0 - 97.0 FL    MCH 28.1 24.0 - 34.0 PG    MCHC 31.5 31.0 - 37.0 g/dL    RDW 16.0 (H) 11.6 - 14.5 %    PLATELET 413 708 - 750 K/uL    MPV 11.1 9.2 - 11.8 FL    NEUTROPHILS 63 40 - 73 %    LYMPHOCYTES 30 21 - 52 %    MONOCYTES 6 3 - 10 % EOSINOPHILS 1 0 - 5 %    BASOPHILS 0 0 - 2 %    ABS. NEUTROPHILS 5.2 1.8 - 8.0 K/UL    ABS. LYMPHOCYTES 2.4 0.9 - 3.6 K/UL    ABS. MONOCYTES 0.5 0.05 - 1.2 K/UL    ABS. EOSINOPHILS 0.1 0.0 - 0.4 K/UL    ABS. BASOPHILS 0.0 0.0 - 0.06 K/UL    DF AUTOMATED     METABOLIC PANEL, COMPREHENSIVE    Collection Time: 02/26/17  4:10 PM   Result Value Ref Range    Sodium 139 136 - 145 mmol/L    Potassium 3.7 3.5 - 5.5 mmol/L    Chloride 104 100 - 108 mmol/L    CO2 28 21 - 32 mmol/L    Anion gap 7 3.0 - 18 mmol/L    Glucose 97 74 - 99 mg/dL    BUN 16 7.0 - 18 MG/DL    Creatinine 1.07 0.6 - 1.3 MG/DL    BUN/Creatinine ratio 15 12 - 20      GFR est AA >60 >60 ml/min/1.73m2    GFR est non-AA >60 >60 ml/min/1.73m2    Calcium 9.1 8.5 - 10.1 MG/DL    Bilirubin, total 0.5 0.2 - 1.0 MG/DL    ALT (SGPT) 18 13 - 56 U/L    AST (SGOT) 13 (L) 15 - 37 U/L    Alk.  phosphatase 67 45 - 117 U/L    Protein, total 7.4 6.4 - 8.2 g/dL    Albumin 4.2 3.4 - 5.0 g/dL    Globulin 3.2 2.0 - 4.0 g/dL    A-G Ratio 1.3 0.8 - 1.7     ACETAMINOPHEN    Collection Time: 02/26/17  4:10 PM   Result Value Ref Range    ACETAMINOPHEN <2 (L) 10 - 30 ug/mL   SALICYLATE    Collection Time: 02/26/17  4:10 PM   Result Value Ref Range    SALICYLATE <8.2 (L) 2.8 - 20.0 MG/DL   ETHYL ALCOHOL    Collection Time: 02/26/17  4:10 PM   Result Value Ref Range    ALCOHOL(ETHYL),SERUM <3 0 - 3 MG/DL   EKG, 12 LEAD, INITIAL    Collection Time: 02/26/17  4:39 PM   Result Value Ref Range    Ventricular Rate 69 BPM    Atrial Rate 69 BPM    P-R Interval 192 ms    QRS Duration 86 ms    Q-T Interval 394 ms    QTC Calculation (Bezet) 422 ms    Calculated P Axis 36 degrees    Calculated R Axis 70 degrees    Calculated T Axis 24 degrees    Diagnosis       Normal sinus rhythm  Nonspecific T wave abnormality  Abnormal ECG  When compared with ECG of 28-OCT-2013 16:03,  Nonspecific T wave abnormality now evident in Anterolateral leads  Confirmed by Zan Gilman [id number]) on 2/27/2017 10:38:15 AM URINALYSIS W/ RFLX MICROSCOPIC    Collection Time: 02/26/17  5:57 PM   Result Value Ref Range    Color YELLOW      Appearance CLEAR      Specific gravity >1.030 (H) 1.005 - 1.030    pH (UA) 5.5 5.0 - 8.0      Protein NEGATIVE  NEG mg/dL    Glucose NEGATIVE  NEG mg/dL    Ketone NEGATIVE  NEG mg/dL    Bilirubin NEGATIVE  NEG      Blood NEGATIVE  NEG      Urobilinogen 0.2 0.2 - 1.0 EU/dL    Nitrites NEGATIVE  NEG      Leukocyte Esterase SMALL (A) NEG     DRUG SCREEN, URINE    Collection Time: 02/26/17  5:57 PM   Result Value Ref Range    BENZODIAZEPINE NEGATIVE  NEG      BARBITURATES NEGATIVE  NEG      THC (TH-CANNABINOL) NEGATIVE  NEG      OPIATES POSITIVE (A) NEG      PCP(PHENCYCLIDINE) NEGATIVE  NEG      COCAINE NEGATIVE  NEG      AMPHETAMINE POSITIVE (A) NEG      METHADONE NEGATIVE       HDSCOM (NOTE)    HCG URINE, QL    Collection Time: 02/26/17  5:57 PM   Result Value Ref Range    HCG urine, Ql. NEGATIVE  NEG     URINE MICROSCOPIC ONLY    Collection Time: 02/26/17  5:57 PM   Result Value Ref Range    WBC 21 to 35 0 - 4 /hpf    RBC NONE 0 - 5 /hpf    Epithelial cells 2+ 0 - 5 /lpf    Bacteria 1+ (A) NEG /hpf   ACETAMINOPHEN    Collection Time: 02/26/17  9:00 PM   Result Value Ref Range    ACETAMINOPHEN <2 (L) 10 - 30 ug/mL   CBC W/O DIFF    Collection Time: 02/27/17  5:30 AM   Result Value Ref Range    WBC 9.8 4.6 - 13.2 K/uL    RBC 3.18 (L) 4.20 - 5.30 M/uL    HGB 8.9 (L) 12.0 - 16.0 g/dL    HCT 28.9 (L) 35.0 - 45.0 %    MCV 90.9 74.0 - 97.0 FL    MCH 28.0 24.0 - 34.0 PG    MCHC 30.8 (L) 31.0 - 37.0 g/dL    RDW 16.5 (H) 11.6 - 14.5 %    PLATELET 463 259 - 176 K/uL    MPV 11.9 (H) 9.2 - 11.8 FL   PTT    Collection Time: 02/27/17  5:30 AM   Result Value Ref Range    aPTT 31.8 23.0 - 36.4 SEC   CALCIUM, IONIZED    Collection Time: 02/27/17  5:30 AM   Result Value Ref Range    Ionized Calcium 1.28 1.12 - 1.32 MMOL/L   MAGNESIUM    Collection Time: 02/27/17  5:30 AM   Result Value Ref Range    Magnesium 1.7 (L) 1.8 - 2.4 mg/dL   METABOLIC PANEL, BASIC    Collection Time: 02/27/17  5:30 AM   Result Value Ref Range    Sodium 143 136 - 145 mmol/L    Potassium 3.5 3.5 - 5.5 mmol/L    Chloride 110 (H) 100 - 108 mmol/L    CO2 19 (L) 21 - 32 mmol/L    Anion gap 14 3.0 - 18 mmol/L    Glucose 107 (H) 74 - 99 mg/dL    BUN 17 7.0 - 18 MG/DL    Creatinine 1.00 0.6 - 1.3 MG/DL    BUN/Creatinine ratio 17 12 - 20      GFR est AA >60 >60 ml/min/1.73m2    GFR est non-AA >60 >60 ml/min/1.73m2    Calcium 8.5 8.5 - 10.1 MG/DL   EKG, 12 LEAD, INITIAL    Collection Time: 02/27/17  5:40 AM   Result Value Ref Range    Ventricular Rate 72 BPM    Atrial Rate 72 BPM    P-R Interval 198 ms    QRS Duration 92 ms    Q-T Interval 440 ms    QTC Calculation (Bezet) 481 ms    Calculated P Axis 63 degrees    Calculated R Axis 84 degrees    Calculated T Axis 55 degrees    Diagnosis       Normal sinus rhythm  Prolonged QT  Abnormal ECG  Confirmed by Otilio Ferreira ([id number]) on 2/27/2017 10:40:37 AM     GLUCOSE, POC    Collection Time: 02/27/17  7:48 AM   Result Value Ref Range    Glucose (POC) 48 (L) 70 - 110 mg/dL   GLUCOSE, POC    Collection Time: 02/27/17  8:13 AM   Result Value Ref Range    Glucose (POC) 139 (H) 70 - 110 mg/dL   GLUCOSE, POC    Collection Time: 02/27/17  9:05 AM   Result Value Ref Range    Glucose (POC) 87 70 - 110 mg/dL   GLUCOSE, POC    Collection Time: 02/27/17  9:56 AM   Result Value Ref Range    Glucose (POC) 155 (H) 70 - 110 mg/dL   GLUCOSE, POC    Collection Time: 02/27/17 11:08 AM   Result Value Ref Range    Glucose (POC) 154 (H) 70 - 110 mg/dL   GLUCOSE, POC    Collection Time: 02/27/17 12:04 PM   Result Value Ref Range    Glucose (POC) 170 (H) 70 - 110 mg/dL         Chemistry Recent Labs      02/27/17   0530  02/26/17   1610   GLU  107*  97   NA  143  139   K  3.5  3.7   CL  110*  104   CO2  19*  28   BUN  17  16   CREA  1.00  1.07   CA  8.5  9.1   MG  1.7*   --    AGAP  14  7   BUCR  17  15   AP   --   67   TP   --   7.4   ALB   -- 4. 2   GLOB   --   3.2   AGRAT   --   1.3        Lactic Acid Lactic acid   Date Value Ref Range Status   06/17/2013 0.7 0.4 - 2.0 MMOL/L Final     No results for input(s): LAC in the last 72 hours. Liver Enzymes Protein, total   Date Value Ref Range Status   02/26/2017 7.4 6.4 - 8.2 g/dL Final     Albumin   Date Value Ref Range Status   02/26/2017 4.2 3.4 - 5.0 g/dL Final     Globulin   Date Value Ref Range Status   02/26/2017 3.2 2.0 - 4.0 g/dL Final     A-G Ratio   Date Value Ref Range Status   02/26/2017 1.3 0.8 - 1.7   Final     AST (SGOT)   Date Value Ref Range Status   02/26/2017 13 (L) 15 - 37 U/L Final     Alk. phosphatase   Date Value Ref Range Status   02/26/2017 67 45 - 117 U/L Final     Recent Labs      02/26/17   1610   TP  7.4   ALB  4.2   GLOB  3.2   AGRAT  1.3   SGOT  13*   AP  67        CBC w/Diff Recent Labs      02/27/17   0530  02/26/17   1610   WBC  9.8  8.2   RBC  3.18*  4.17*   HGB  8.9*  11.7*   HCT  28.9*  37.2   PLT  203  259   GRANS   --   63   LYMPH   --   30   EOS   --   1        Cardiac Enzymes No results found for: CPK, CKMMB, CKMB, RCK3, CKMBT, CKNDX, CKND1, SLOAN, TROPT, TROIQ, BOAZ, TROPT, TNIPOC, BNP, BNPP     BNP No results found for: BNP, BNPP, XBNPT     Coagulation Recent Labs      02/27/17   0530   APTT  31.8         Thyroid  Lab Results   Component Value Date/Time    TSH 0.544 06/15/2016 09:40 AM       No results found for: T4     Lipid Panel No results found for: CHOL, CHOLPOCT, CHOLX, CHLST, CHOLV, 516708, HDL, LDL, NLDLCT, DLDL, LDLC, DLDLP, 429197, VLDLC, VLDL, TGL, TGLX, TRIGL, [id number], TRIGP, TGLPOCT, 173391, CHHD, CHHDX     ABG No results for input(s): PHI, PHI, PCO2I, PO2, PO2I, HCO3, HCO3I, FIO2, FIO2I in the last 72 hours.     No lab exists for component: POC2     Urinalysis Lab Results   Component Value Date/Time    Color YELLOW 02/26/2017 05:57 PM    Appearance CLEAR 02/26/2017 05:57 PM    Specific gravity >1.030 02/26/2017 05:57 PM    pH (UA) 5.5 02/26/2017 05:57 PM    Protein NEGATIVE  02/26/2017 05:57 PM    Glucose NEGATIVE  02/26/2017 05:57 PM    Ketone NEGATIVE  02/26/2017 05:57 PM    Bilirubin NEGATIVE  02/26/2017 05:57 PM    Urobilinogen 0.2 02/26/2017 05:57 PM    Nitrites NEGATIVE  02/26/2017 05:57 PM    Leukocyte Esterase SMALL 02/26/2017 05:57 PM    Epithelial cells 2+ 02/26/2017 05:57 PM    Bacteria 1+ 02/26/2017 05:57 PM    WBC 21 to 35 02/26/2017 05:57 PM    RBC NONE 02/26/2017 05:57 PM        Micro  No results for input(s): SDES, CULT in the last 72 hours. No results for input(s): CULT in the last 72 hours. EKG  No results found for this or any previous visit. PFT  No flowsheet data found. Other  ASA reactivity:   Pre-albumin:   Ionized Calcium:   NH4:   T3, FT4:  Cortisol:  Urine Osm:  Urine Lytes:   HbA1c:      Ultrasound       LE Doppler       ECHO  No results found for this or any previous visit. CT (Most Recent)    Results from Hospital Encounter encounter on 03/11/16   CT ABD PELV WO CONT   Narrative EXAM: CT of the abdomen and pelvis    INDICATION: Bilateral flank pain    COMPARISON: 09/08/2015    TECHNIQUE: Helical volumetric scanning through the abdomen and pelvis was  performed without oral or intravenous contrast. Axial, sagittal and coronal  reconstructions were generated. Dose reduction techniques used: Automated  exposure control, adjustment of the mAs and/or kVp according to patient's size,  and/or iterative reconstruction techniques. _______________    FINDINGS:    LOWER CHEST: Within normal range. LIVER, BILIARY: Liver is normal for noncontrast technique. No biliary dilation. Gallbladder is unremarkable. PANCREAS: Normal for noncontrast technique. SPLEEN: Normal for noncontrast technique. ADRENALS: Normal.    KIDNEYS: Renal parenchyma appears normal for noncontrast technique. No renal or  ureteral calculi. No signs of obstructing uropathy. LYMPH NODES: No enlarged lymph nodes.     GASTROINTESTINAL TRACT: No bowel dilatation or wall thickening. No adjacent  stranding. The appendix is normal.    PELVIC ORGANS: Small low density in the left ovary suggests a follicle. Small  fat-containing umbilical hernia is present. VASCULATURE: Within normal range. BONES: No acute or aggressive osseous abnormalities identified. OTHER: None.    _______________         Impression IMPRESSION:    No acute process identified. Specifically, no renal or ureteral calculi. Small fat-containing umbilical hernia, without change. A preliminary report was given by the radiology resident on call. XR (Most Recent). CXR  reviewed by me and compared with previous CXR   Results from Hospital Encounter encounter on 02/26/17   XR CHEST PORT   Narrative EXAM: Chest portable    INDICATION: Status post central line insertion    COMPARISON: Two-view chest 6/11/2012    _______________    FINDINGS:    AP portable chest film was performed. Right jugular central line is present with  the tip in good position at the cavoatrial junction. No pneumothorax. Suboptimal  inspiration. No focal infiltrate or effusion. Heart and pulmonary vascularity  are normal.    _______________         Impression IMPRESSION:      1. Right jugular central line good position. No acute cardiopulmonary disease. Best practice : All below core measures reviewed and addressed:   [x] Antibiotic choice. [x] Severe Sepsis bundles follwed; SIRS screen met? No  [x] Fluids. [x] Lactic acid ordered- initial and repeat Q6hrs if elevated till normalized. [x] Cultures drawn. [x] Antibiotic administered within 1hr-ICU and 3hrs ED. [x] Large bore IV- 2 sites           [x] Pressors aim MAP >65mmHg. [x] Steroids. [x] Glycemic control. [x] Infection control. [x] Mech. Ventilated patients- aim to keep peak plateau pressure 85-12BS H2O. [x] HOB at >= 30 degree. [x] Stress ulcer prophylaxis. [x] DVT prophylaxis.   [x] Central Line Bundle Followed. [x] Fan Bundle Followed. [x] Ventilator Bundle Followed. (Daily sedation holiday to assess readiness for weaning from ventilator & SBT trial starting at 6.00 am, HOB 30-degree elevation, Chlorhexidine mouth washes & Routine oral care every 4 hours, [place] tube to suction at 20-30 cm H2O, Maintain [place] tube with 5-10ml air every 4 hours, DVT prophylaxis, GI prophylaxis etc.). The patient is: [] acutely ill Risk of deterioration: [] moderate    [x] critically ill  [x] high     [x]See my orders for details    My assessment, plan of care, findings, medications, side effects etc were discussed with:  [x] Nurse [] PT/OT    [x] Respiratory therapy [x] Dr. Jayson Hedrick   [x] Pharmacist [x] MDR   [] Family: answered all questions to satisfaction [x] Patient: answered all questions to satisfaction   [x]  [x]      [x] Case & management strategies discussed today on multidisciplinary rounds    High complexity decision making was performed during the evaluation of this patient at high risk for decompensation with multiple organ involvement    [x]Total time spent on reviewing the case/medical record/data/notes/EMR/patient examination/documentation/coordinating care with nurse/consultants, exclusive of procedures with complex decision making performed >35 minutes and > 50% time spent in face to face evaluation, as mentioned above.       Elisa Rodriguez  2/27/2017

## 2017-02-27 NOTE — ED NOTES
[facility] with [place] [facility]. Continue to monitor patient for extreme changes in vital signs.

## 2017-02-27 NOTE — ED NOTES
Bedside and Verbal shift change report given to Sofi Parker RN (oncoming nurse) by Ric Reid RN (offgoing nurse). Report included the following information SBAR, ED Summary and MAR.

## 2017-02-27 NOTE — PROGRESS NOTES
[facility]   Discharge Planning/ Assessment    Reasons for Intervention: Chart reviewed. Pt unable to be interviewed as lethargic and unable to answer questions. Person at the bedside indicated he was her spouse/legally , Phuong Esparza [phone]. Pt is unable to verify this. I did call and verify with patient's mother that the pt is  to Anastasiya lala. They both indicate that she has not changed her name legally  yet. Verified face sheet information is correct except for marital status and NOK. Sent corrections to registration. PCP : Dr. Pablo Douglas. Insurance : [employer]. Pt lived with spouse prior to admission and they have a 2 month old son. PLAN : Psych eval pending, await psych recommendation.     High Risk Criteria  [x] Yes  []No   Physician Referral  [] Yes  [x]No        Date    Nursing Referral  [] Yes  [x]No        Date    Patient/Family Request  [] Yes  [x]No        Date       Resources:    Medicare  [] Yes  [x]No   Medicaid  [x] Yes  []No   No Resources  [] Yes  [x]No   Private Insurance  [] Yes  [x]No    Name/Phone Number    Other  [] Yes  [x]No        (i.e. Workman's Comp)         Prior Services:    Prior Services  [] Yes  [x]No   Home Health  [] Yes  [x]No   [address]  [] Yes  [x]No        Number of [address]  [] Yes  [x]No       Meals on Wheels  [] Yes  [x]No   Office on Aging  [] Yes  [x]No   Transportation Services  [] Yes  [x]No   Nursing Home  [] Yes  [x]No        Nursing Home Name    [facility]  [] Yes  [x]No        [address] Name    Other       Information Source:      Information obtained from  [] Patient  [x] Parent   [] [address]  [] Child  [x] Spouse   [] Significant Other/Partner   [] Friend      [] EMS    [] Nursing Home Chart          [] Other:   Chart Review  [x] Yes  []No     Family/Support System:    Patient lives with  [] Alone    [x] Spouse   [] Significant Other  [x] Children  [] Caretaker   [] Parent  [] Sibling     [] Other       Other Support System:    Is the patient responsible for care of others  [x] Yes  []No   Information of person caring for patient on  discharge    Managers financial affairs independently  [x] Yes  []No   If no, explain:      Status Prior to Admission:    Mental Status  [x] Awake  [x] Alert  [x] Oriented  [] Quiet/Calm [] Lethargic/Sedated   [] Disoriented  [] Restless/Anxious  [] Combative   Personal Care  [] Dependent  [x] [address]  [] Requires Assistance   Meal Preparation Ability  [x] Independent   [] Standby Assistance   [] Minimal Assistance   [] Moderate Assistance  [] Maximum Assistance     [] Total Assistance   Chores  [x] Independent with Chores   [] [address] [place] Resident   [] Requires Assistance   Bowel/Bladder  [x] Continent  [] Catheter  [] Incontinent  [] Ostomy Self-Care    [] Urine Diversion Self-Care  [] Maximum Assistance     [] Total Assistance   Number of Persons needed for assistance    DME at home  [] Cesar Sheppard  [] Dar Sheppard   [] Commode    [] Bathroom/Grab Bars  [] Hospital Bed  [] Nebulizer  [] Oxygen           [] Raised Toilet Seat  [] Shower Chair  [] Side Rails for Bed   [] Tub Transfer Bench   [] Quin Clarity  [] Josh Paez, Standard      [] Other:   Vendor      Treatment Presently Receiving:    Current Treatments  [] Chemotherapy  [] Dialysis  [] Insulin  [] IVAB [] IVF   [] O2  [] PCA   [] PT   [] RT   [] Tube Feedings   [] Wound Care     Psychosocial Evaluation:    Verbalized Knowledge of Disease Process  [] Patient  []Family   Coping with Disease Process  [] Patient  []Family   Requires Further Counseling Coping with Disease Process  [] Patient  []Family     Identified Projected Needs:    Home Health Aid  [] Yes  [x]No   Transportation  [] Yes  [x]No   Education  [] Yes  [x]No        Specific Education     Financial Counseling  [] Yes  [x]No   Inability to Care for Self/Will Require 24 hour care  [] Yes [x]No   Pain Management  [] Yes  [x]No   Home Infusion Therapy  [] Yes  [x]No   Oxygen Therapy  [] Yes  [x]No   DME  [] Yes  [x]No   Long Term Care Placement  [] Yes  [x]No   Rehab  [] Yes  [x]No   Physical Therapy  [] Yes  [x]No   Needs Anticipated At This Time  [] Yes  [x]No     Intra-Hospital Referral:    [facility]  [] Yes  [x]No     [] Yes  [x]No   Patient Representative  [] Yes  [x]No   Staff for Teaching Needs  [] Yes  [x]No   Specialty Teaching Needs     Diabetic Educator  [] Yes  [x]No   Referral for Diabetic Educator Needed  [] Yes  [x]No  If Yes, place order for Nutritionist or Diabetic Consult     Tentative Discharge Plan:    Home with No Services  [x] Yes  []No   Home with Home Health Follow-up  [] Yes  [x]No        If Yes, specify type    Home Care Program  [] Yes  [x]No        If Yes, specify type    Meals on Wheels  [] Yes  [x]No   Office of Aging  [] Yes  [x]No   NHP  [] Yes  [x]No   Return to the Nursing Home  [] Yes  [x]No   Rehab Therapy  [] Yes  [x]No   Acute Rehab  [] Yes  [x]No   Subacute Rehab  [] Yes  [x]No   Private Care  [] Yes  [x]No   Substance Abuse Referral  [] Yes  [x]No   Transportation  [] Yes  [x]No   Chore Service  [] Yes  [x]No   Inpatient Hospice  [] Yes  [x]No   OP RT  [] Yes  [x] No   OP Hemo  [] Yes  [x] No   OP PT  [] Yes  [x]No   Support Group  [] Yes  [x]No   Reach to Recovery  [] Yes  [x]No   OP Oncology Clinic  [] Yes  [x]No   Clinic Appointment  [] Yes  [x]No   DME  [] Yes  [x]No   Comments    Name of D/C Planner or  Given to Patient or Family Isidro Half RN   Phone Number         Khlath[address]dk [phone]   Date 2/27/17   Time    If you are discharged home, whom do you designate to participate in your discharge plan and receive any information needed?      Enter name of designee Pt unable to designate        Phone # of designee         Address of designee         Updated         Patient refused to designate any           individual

## 2017-02-27 NOTE — ED NOTES
Spoke with [place] at [facility] - not comfortable with medically clearing patient for Psych evaluation due to drop in BP. Will continue to monitor and call back in a few hours. If BP continues to drop will consider other measures. Patient is currently awake and speaking to friends and family at the bedside.

## 2017-02-27 NOTE — PROGRESS NOTES
Problem: Discharge Planning  Goal: *Discharge to safe environment  Outcome: Progressing Towards Goal  Plan dependent upon psych  recommendation

## 2017-02-28 LAB
ALBUMIN SERPL BCP-MCNC: 3.3 G/DL (ref 3.4–5)
ALBUMIN/GLOB SERPL: 1.2 {RATIO} (ref 0.8–1.7)
ALP SERPL-CCNC: 56 U/L (ref 45–117)
ALT SERPL-CCNC: 15 U/L (ref 13–56)
ANION GAP BLD CALC-SCNC: 11 MMOL/L (ref 3–18)
AST SERPL W P-5'-P-CCNC: 8 U/L (ref 15–37)
BACTERIA SPEC CULT: NORMAL
BASOPHILS # BLD AUTO: 0 K/UL (ref 0–0.06)
BASOPHILS # BLD: 0 % (ref 0–2)
BILIRUB DIRECT SERPL-MCNC: 0.1 MG/DL (ref 0–0.2)
BILIRUB SERPL-MCNC: 0.4 MG/DL (ref 0.2–1)
BUN SERPL-MCNC: 11 MG/DL (ref 7–18)
BUN/CREAT SERPL: 13 (ref 12–20)
CA-I SERPL-SCNC: 1.21 MMOL/L (ref 1.12–1.32)
CALCIUM SERPL-MCNC: 8.2 MG/DL (ref 8.5–10.1)
CHLORIDE SERPL-SCNC: 112 MMOL/L (ref 100–108)
CO2 SERPL-SCNC: 19 MMOL/L (ref 21–32)
CREAT SERPL-MCNC: 0.86 MG/DL (ref 0.6–1.3)
DIFFERENTIAL METHOD BLD: ABNORMAL
EOSINOPHIL # BLD: 0.1 K/UL (ref 0–0.4)
EOSINOPHIL NFR BLD: 1 % (ref 0–5)
ERYTHROCYTE [DISTWIDTH] IN BLOOD BY AUTOMATED COUNT: 15.9 % (ref 11.6–14.5)
GLOBULIN SER CALC-MCNC: 2.8 G/DL (ref 2–4)
GLUCOSE BLD STRIP.AUTO-MCNC: 120 MG/DL (ref 70–110)
GLUCOSE BLD STRIP.AUTO-MCNC: 124 MG/DL (ref 70–110)
GLUCOSE BLD STRIP.AUTO-MCNC: 97 MG/DL (ref 70–110)
GLUCOSE SERPL-MCNC: 71 MG/DL (ref 74–99)
HCT VFR BLD AUTO: 32.5 % (ref 35–45)
HGB BLD-MCNC: 10.3 G/DL (ref 12–16)
LYMPHOCYTES # BLD AUTO: 25 % (ref 21–52)
LYMPHOCYTES # BLD: 1.7 K/UL (ref 0.9–3.6)
MCH RBC QN AUTO: 27.9 PG (ref 24–34)
MCHC RBC AUTO-ENTMCNC: 31.7 G/DL (ref 31–37)
MCV RBC AUTO: 88.1 FL (ref 74–97)
MONOCYTES # BLD: 0.4 K/UL (ref 0.05–1.2)
MONOCYTES NFR BLD AUTO: 7 % (ref 3–10)
NEUTS SEG # BLD: 4.4 K/UL (ref 1.8–8)
NEUTS SEG NFR BLD AUTO: 67 % (ref 40–73)
PLATELET # BLD AUTO: 229 K/UL (ref 135–420)
PMV BLD AUTO: 12 FL (ref 9.2–11.8)
POTASSIUM SERPL-SCNC: 3.5 MMOL/L (ref 3.5–5.5)
PROT SERPL-MCNC: 6.1 G/DL (ref 6.4–8.2)
RBC # BLD AUTO: 3.69 M/UL (ref 4.2–5.3)
SERVICE CMNT-IMP: NORMAL
SODIUM SERPL-SCNC: 142 MMOL/L (ref 136–145)
WBC # BLD AUTO: 6.6 K/UL (ref 4.6–13.2)

## 2017-02-28 PROCEDURE — 82330 ASSAY OF CALCIUM: CPT | Performed by: PHYSICIAN ASSISTANT

## 2017-02-28 PROCEDURE — 82962 GLUCOSE BLOOD TEST: CPT

## 2017-02-28 PROCEDURE — 85025 COMPLETE CBC W/AUTO DIFF WBC: CPT | Performed by: PHYSICIAN ASSISTANT

## 2017-02-28 PROCEDURE — 74011250637 HC RX REV CODE- 250/637: Performed by: EMERGENCY MEDICINE

## 2017-02-28 PROCEDURE — 74011250636 HC RX REV CODE- 250/636: Performed by: HOSPITALIST

## 2017-02-28 PROCEDURE — 80076 HEPATIC FUNCTION PANEL: CPT | Performed by: PHYSICIAN ASSISTANT

## 2017-02-28 PROCEDURE — 74011000258 HC RX REV CODE- 258: Performed by: INTERNAL MEDICINE

## 2017-02-28 PROCEDURE — 77030020245 HC SOL INJ 5% D/0.9%NACL

## 2017-02-28 PROCEDURE — 65660000000 HC RM CCU STEPDOWN

## 2017-02-28 PROCEDURE — 80048 BASIC METABOLIC PNL TOTAL CA: CPT | Performed by: PHYSICIAN ASSISTANT

## 2017-02-28 RX ADMIN — Medication 10 ML: at 05:38

## 2017-02-28 RX ADMIN — NITROFURANTOIN (MONOHYDRATE/MACROCRYSTALS) 100 MG: 75; 25 CAPSULE ORAL at 18:16

## 2017-02-28 RX ADMIN — Medication 10 ML: at 18:16

## 2017-02-28 RX ADMIN — NITROFURANTOIN (MONOHYDRATE/MACROCRYSTALS) 100 MG: 75; 25 CAPSULE ORAL at 08:00

## 2017-02-28 RX ADMIN — ENOXAPARIN SODIUM 40 MG: 40 INJECTION SUBCUTANEOUS at 02:03

## 2017-02-28 RX ADMIN — DEXTROSE MONOHYDRATE AND SODIUM CHLORIDE 75 ML/HR: 5; .9 INJECTION, SOLUTION INTRAVENOUS at 10:54

## 2017-02-28 NOTE — PROGRESS NOTES
[facility]  Pulmonary, Critical Care, and Sleep Medicine    Name: Mika Griffin MRN: [id number]   : 1990 Hospital: [address] [place] [address]   Date: 2017        [facility] Progress Note                                              Consult requesting physician: Dr. Suzanne Faith  Reason for Consult: Intentional OD    [x] I have reviewed the flowsheet and previous days notes. Events, vitals, medications and notes from last 24 hours reviewed. Care plan discussed with staff, patient, family and on multidisciplinary rounds. IMPRESSION:   · Overdose, intentional/SA with labetalol and ibuprofen, on glucagon gtt-dc'd  · Hypotension due to above, on dopamine-d/c'd, resolved  · Hypomagnesemia, being replaced  · Hypoglycemia, improved with D50, on D5  · +UTI  · UDS +amphetamines, opiates  · Postpartum 3 mos  · Hx HTN  · Hx hypothyroidism  · Hx depression, SI    · Code status: FULL      RECOMMENDATIONS:   · CVS: Off Dopamine. BP ranges 117//71, HR ranges 70's-80's Monitor HD. Posion control following. · Respiratory: Currently stable on RA. O2nc prn for O2 sat>94%. Aspiration precautions. CXR without acute process. · ID:  On macrobid for +UA. urine cx.-possible contamination. Trend temps, WBC  · Hematology/Oncology: Hgb 10.3 today. Monitor Hb and any active bleeding. · Renal:  Monitor BUN, Cr.   · Endocrine:  Avoid hypoglycemia, Off Glucagon drip, TSH  >100  · Neurology/Pain/Sedation:  Psych consult. Sitter at bedside. Suicide precautions. Poison control following closely. glucagon gtt -d'c'd. Calcium chloride prn. UDS +amphetamines and opioids  · FEN: Diet as tolerated,. Replace lytes per ICU protocol  ·  PPX: lovenox                                                             ·   ·   · Glycemic Control. Glucose stabilizer per ICU protocol when on insulin drip. Maintain blood glucose 140-180.    · Replace electrolytes per ICU electrolyte replacement protocol  · Ventilator bundle & Sedation protocol followed. Daily sedation holiday, assessment for readiness for SBT and then re-titrate if required. Chlorhexidine mouth washes. · HOB 30 degree elevation all the time. Aggressive pulmonary toileting. Incentive spirometry when appropriate. Aspiration precautions. · Sepsis bundle and protocol followed. Follow serial lactic acid, frequent BMP check, fluid resuscitation. Follow cultures. Deescalate antibiotic when appropriate. · Rogers bundle followed, remove rogers catheter when not critically ill. · Central Line bundle followed, remove when not needed. · Skin & Wound care. · Weekly prealbumin, nutritional consult. · PT/OT eval and treat. OOB when appropriate. · Further recommendations will be based on the patient's response to recommended treatment and results of the investigation ordered. · Quality Care: PPI, DVT prophylaxis, HOB elevated, Infection control all reviewed and addressed. · Events and notes from last 24 hours reviewed. Care plan discussed with nursing     Subjective/History of Present Illness:     Marietta Edge has been seen and evaluated in ICU Bed 2707. Patient is a 23 y.o. female with PMHx significant for HTN, thyroid disease, seizure, recently postpartum 3 months ago who presented to the ED with SI after taking labetalol (56 tabs) and ibuprofen (\"1 bottle\"). She was hypotensive in the ED and posion control was contacted. UDS +amphetamines and opiates. UA +. She was started on glucagon and calcium chloride as well as glucogon gtt. R IJ was placed. Pt was transferred to ICU for further evaluation and management. 10/27: Ms Raymon Aparicio currently c/o neck discomfort at R IJ site, denies lightheadedness, dizziness. No SOB, CP/palpitations. HR 60-70's. She became more hypotensive and was started on dopamine.  Additionally, her glucose was noted to be 48 this am and she required D50.    10/28: Pt awake and alert on room air, she report upset no able to go to a BR, otherwise no other complaint verbalized. Sitter at bedside. She denies chest pain, SOB, dizziness or syncope. Hemodynamically stable- off pressors, Glucose 71 with early am labs-had diet ordered. No Known Allergies   Past Medical History:   Diagnosis Date    Endocrine disease Hypothyroidism    Hypertension     Seizures ([address]     epilepsy    Thyroid disease       Past Surgical History:   Procedure Laterality Date    HX TONSILLECTOMY      Age 7      Social History   Substance Use Topics    Smoking status: Former Smoker     Packs/day: 0.50     Years: 5.00    Smokeless tobacco: Never Used    Alcohol use 0.0 oz/week     0 Standard drinks or equivalent per week      Comment: sometimes      Family History   Problem Relation Age of Onset    Diabetes Other      great grandmother    Hypertension Other      family history    Thyroid Disease Other      great grandmother      Prior to Admission medications    Medication Sig Start Date End Date Taking? Authorizing Provider   levothyroxine (SYNTHROID) 300 mcg tablet Take 1 Tab by mouth Daily (before breakfast). 6/23/16  Yes Sindy Coker MD   labetalol (NORMODYNE) 100 mg tablet 100 mg. 4/30/16  Yes Historical Provider   PNV no.24-iron-folic acid-dha -985 mg-mcg-mg cmpk Take  by mouth. Myles Vanegas MD   levothyroxine (SYNTHROID) 25 mcg tablet Take 1 Tab by mouth Daily (before breakfast).  6/23/16   Sindy Coker MD     Current Facility-Administered Medications   Medication Dose Route Frequency    glucagon (GLUCAGEN) 0.04 mg/mL in dextrose 5% 250 mL infusion  5 mg/hr IntraVENous CONTINUOUS    enoxaparin (LOVENOX) injection 40 mg  40 mg SubCUTAneous Q24H    sodium chloride (NS) flush 5-10 mL  5-10 mL IntraVENous Q8H    dextrose 5% and 0.9% NaCl infusion  75 mL/hr IntraVENous CONTINUOUS    DOPamine (INTROPIN) 1,600 mcg/mL infusion (STANDARD)  0-10 mcg/kg/min IntraVENous TITRATE    nitrofurantoin (macrocrystal-monohydrate) (MACROBID) capsule 100 mg  100 mg Oral BID       Lines: All central lines examined by me. No signs of erythema, induration, discharge. Central Venous Catheter:  Triple Lumen Central line 02/27/17 Right Internal jugular (Active)   Central Line Being Utilized Yes 2/27/2017  5:00 AM   Criteria for Appropriate Use Hemodynamically unstable, requiring monitoring lines, vasopressors, or volume resuscitation 2/27/2017  5:00 AM   Site Assessment Clean, dry, & intact 2/27/2017  7:00 AM   Infiltration Assessment 0 2/27/2017  7:00 AM   Affected Extremity/Extremities Color distal to insertion site pink (or appropriate for race); Pulses palpable;Range of motion performed 2/27/2017  7:00 AM   Date of Last Dressing Change 02/27/17 2/27/2017  5:00 AM   Dressing Status Clean, dry, & intact 2/27/2017  7:00 AM   Dressing Type Transparent;Tape;Disk with Chlorhexadine Gluconate (CHG) 2/27/2017  5:00 AM   Action Taken Open ports on tubing capped 2/27/2017  5:00 AM   Proximal Hub Color/Line Status White 2/27/2017  5:00 AM   Positive Blood Return (Medial Site) Yes 2/27/2017  5:00 AM   Medial Hub Color/Line Status Blue 2/27/2017  5:00 AM   Positive Blood Return (Lateral Site) Yes 2/27/2017  5:00 AM   Distal Hub Color/Line Status Brown 2/27/2017  5:00 AM   Positive Blood Return (Site #3) Yes 2/27/2017  5:00 AM   External Catheter Length (cm) 2 centimeters 2/27/2017  5:00 AM   Alcohol Cap Used Yes 2/27/2017  5:00 AM        Peripheral Intravenous Line:  Peripheral IV 02/26/17 Right Forearm (Active)   Site Assessment Clean, dry, & intact 2/27/2017  7:00 AM   Phlebitis Assessment 0 2/27/2017  7:00 AM   Infiltration Assessment 0 2/27/2017  7:00 AM   Dressing Status Clean, dry, & intact 2/27/2017  7:00 AM   Dressing Type Transparent;Tape 2/27/2017  5:00 AM   Hub Color/Line Status Green;Flushed;Capped 2/27/2017  5:00 AM   Action Taken Open ports on tubing capped 2/27/2017  5:00 AM   Alcohol Cap Used Yes 2/27/2017  5:00 AM            Telemetry:normal sinus rhythm    Objective: Vital Signs:    Visit Vitals    /59    Pulse 79    Temp 97.4 °F (36.3 °C)    Resp 16    Ht 5' 5\" (1.651 m)    Wt 123.6 kg (272 lb 7.8 oz)    LMP 2017    SpO2 99%    Breastfeeding Unknown    BMI 45.34 kg/m2       O2 Device: Room air       Temp (24hrs), Av.9 °F (36.6 °C), Min:97.4 °F (36.3 °C), Max:98.3 °F (36.8 °C)       Intake/Output:   Last shift:       07 -  190  In: 480 [P.O.:480]  Out: -     Last 3 shifts: 1901 -  0700  In: 5764.1 [P.O.:240; I.V.:5524.1]  Out: 5750 [Urine:5750]      Intake/Output Summary (Last 24 hours) at 17 1109  Last data filed at 17 1102   Gross per 24 hour   Intake          4496.71 ml   Output             3900 ml   Net           596.71 ml       Last 3 Recorded Weights in this Encounter    17 1626 17 0450 17 0600   Weight: 111.1 kg (245 lb) 121.7 kg (268 lb 4.8 oz) 123.6 kg (272 lb 7.8 oz)         Physical Exam:     General/Neurology: Alert, Awake, NAD, FC, oriented, moves all extremities, no FND  Head:   Normocephalic, without obvious abnormality, atraumatic. Eye:   PERRL, no scleral icterus, no pallor, no cyanosis  Nose:   No sinus tenderness, no erythema  Throat:  Lips, mucosa, and tongue normal.   Neck:   Supple, R IJ  Lung:   Adequate air entry bilateral equal, no rales, no rhonchi, no wheezing. No prolonged expiration. Heart:   Regular, no murmur  Abdomen:  Soft, NT, ND, +BS, +obese  Extremities:  No pedal edema, no cyanosis, no clubbing  Pulses: 2+ and symmetric in DP.    Skin:   Color, texture, turgor normal.      Data:       Recent Results (from the past 24 hour(s))   GLUCOSE, POC    Collection Time: 17 12:04 PM   Result Value Ref Range    Glucose (POC) 170 (H) 70 - 110 mg/dL   GLUCOSE, POC    Collection Time: 17  3:25 PM   Result Value Ref Range    Glucose (POC) 219 (H) 70 - 110 mg/dL   MAGNESIUM    Collection Time: 17  8:13 PM   Result Value Ref Range    Magnesium 2.3 1.8 - 2.4 mg/dL   GLUCOSE, POC    Collection Time: 02/27/17  8:14 PM   Result Value Ref Range    Glucose (POC) 139 (H) 70 - 110 mg/dL   GLUCOSE, POC    Collection Time: 02/27/17 11:49 PM   Result Value Ref Range    Glucose (POC) 89 70 - 110 mg/dL   CBC WITH AUTOMATED DIFF    Collection Time: 02/28/17  3:40 AM   Result Value Ref Range    WBC 6.6 4.6 - 13.2 K/uL    RBC 3.69 (L) 4.20 - 5.30 M/uL    HGB 10.3 (L) 12.0 - 16.0 g/dL    HCT 32.5 (L) 35.0 - 45.0 %    MCV 88.1 74.0 - 97.0 FL    MCH 27.9 24.0 - 34.0 PG    MCHC 31.7 31.0 - 37.0 g/dL    RDW 15.9 (H) 11.6 - 14.5 %    PLATELET 537 774 - 600 K/uL    MPV 12.0 (H) 9.2 - 11.8 FL    NEUTROPHILS 67 40 - 73 %    LYMPHOCYTES 25 21 - 52 %    MONOCYTES 7 3 - 10 %    EOSINOPHILS 1 0 - 5 %    BASOPHILS 0 0 - 2 %    ABS. NEUTROPHILS 4.4 1.8 - 8.0 K/UL    ABS. LYMPHOCYTES 1.7 0.9 - 3.6 K/UL    ABS. MONOCYTES 0.4 0.05 - 1.2 K/UL    ABS. EOSINOPHILS 0.1 0.0 - 0.4 K/UL    ABS. BASOPHILS 0.0 0.0 - 0.06 K/UL    DF AUTOMATED     HEPATIC FUNCTION PANEL    Collection Time: 02/28/17  3:40 AM   Result Value Ref Range    Protein, total 6.1 (L) 6.4 - 8.2 g/dL    Albumin 3.3 (L) 3.4 - 5.0 g/dL    Globulin 2.8 2.0 - 4.0 g/dL    A-G Ratio 1.2 0.8 - 1.7      Bilirubin, total 0.4 0.2 - 1.0 MG/DL    Bilirubin, direct 0.1 0.0 - 0.2 MG/DL    Alk.  phosphatase 56 45 - 117 U/L    AST (SGOT) 8 (L) 15 - 37 U/L    ALT (SGPT) 15 13 - 56 U/L   CALCIUM, IONIZED    Collection Time: 02/28/17  3:40 AM   Result Value Ref Range    Ionized Calcium 1.21 1.12 - 4.80 MMOL/L   METABOLIC PANEL, BASIC    Collection Time: 02/28/17  3:40 AM   Result Value Ref Range    Sodium 142 136 - 145 mmol/L    Potassium 3.5 3.5 - 5.5 mmol/L    Chloride 112 (H) 100 - 108 mmol/L    CO2 19 (L) 21 - 32 mmol/L    Anion gap 11 3.0 - 18 mmol/L    Glucose 71 (L) 74 - 99 mg/dL    BUN 11 7.0 - 18 MG/DL    Creatinine 0.86 0.6 - 1.3 MG/DL    BUN/Creatinine ratio 13 12 - 20      GFR est AA >60 >60 ml/min/1.73m2    GFR est non-AA >60 >60 ml/min/1.73m2 Calcium 8.2 (L) 8.5 - 10.1 MG/DL         Chemistry Recent Labs      02/28/17   0340 02/27/17 2013 02/27/17   0530  02/26/17   1610   GLU  71*   --   107*  97   NA  142   --   143  139   K  3.5   --   3.5  3.7   CL  112*   --   110*  104   CO2  19*   --   19*  28   BUN  11   --   17  16   CREA  0.86   --   1.00  1.07   CA  8.2*   --   8.5  9.1   MG   --   2.3  1.7*   --    AGAP  11   --   14  7   BUCR  13   --   17  15   AP  56   --    --   67   TP  6.1*   --    --   7.4   ALB  3.3*   --    --   4.2   GLOB  2.8   --    --   3.2   AGRAT  1.2   --    --   1.3        Lactic Acid Lactic acid   Date Value Ref Range Status   06/17/2013 0.7 0.4 - 2.0 MMOL/L Final     No results for input(s): LAC in the last 72 hours. Liver Enzymes Protein, total   Date Value Ref Range Status   02/28/2017 6.1 (L) 6.4 - 8.2 g/dL Final     Albumin   Date Value Ref Range Status   02/28/2017 3.3 (L) 3.4 - 5.0 g/dL Final     Globulin   Date Value Ref Range Status   02/28/2017 2.8 2.0 - 4.0 g/dL Final     A-G Ratio   Date Value Ref Range Status   02/28/2017 1.2 0.8 - 1.7   Final     AST (SGOT)   Date Value Ref Range Status   02/28/2017 8 (L) 15 - 37 U/L Final     Alk.  phosphatase   Date Value Ref Range Status   02/28/2017 56 45 - 117 U/L Final     Recent Labs      02/28/17   0340 02/26/17   1610   TP  6.1*  7.4   ALB  3.3*  4.2   GLOB  2.8  3.2   AGRAT  1.2  1.3   SGOT  8*  13*   AP  56  67        CBC w/Diff Recent Labs      02/28/17   0340 02/27/17   0530  02/26/17   1610   WBC  6.6  9.8  8.2   RBC  3.69*  3.18*  4.17*   HGB  10.3*  8.9*  11.7*   HCT  32.5*  28.9*  37.2   PLT  229  203  259   GRANS  67   --   63   LYMPH  25   --   30   EOS  1   --   1        Cardiac Enzymes No results found for: CPK, CKMMB, CKMB, RCK3, CKMBT, CKNDX, CKND1, SLOAN, TROPT, TROIQ, BOAZ, TROPT, TNIPOC, BNP, BNPP     BNP No results found for: BNP, BNPP, XBNPT     Coagulation Recent Labs      02/27/17   0530   APTT  31.8         Thyroid  Lab Results   Component Value Date/Time    TSH >100.00 02/27/2017 05:30 AM       No results found for: T4     Lipid Panel No results found for: CHOL, CHOLPOCT, CHOLX, CHLST, CHOLV, 254005, HDL, LDL, NLDLCT, DLDL, LDLC, DLDLP, 026019, VLDLC, VLDL, TGL, TGLX, TRIGL, [id number], TRIGP, TGLPOCT, 666947, CHHD, CHHDX     ABG No results for input(s): PHI, PHI, PCO2I, PO2, PO2I, HCO3, HCO3I, FIO2, FIO2I in the last 72 hours. No lab exists for component: POC2     Urinalysis Lab Results   Component Value Date/Time    Color YELLOW 02/26/2017 05:57 PM    Appearance CLEAR 02/26/2017 05:57 PM    Specific gravity >1.030 02/26/2017 05:57 PM    pH (UA) 5.5 02/26/2017 05:57 PM    Protein NEGATIVE  02/26/2017 05:57 PM    Glucose NEGATIVE  02/26/2017 05:57 PM    Ketone NEGATIVE  02/26/2017 05:57 PM    Bilirubin NEGATIVE  02/26/2017 05:57 PM    Urobilinogen 0.2 02/26/2017 05:57 PM    Nitrites NEGATIVE  02/26/2017 05:57 PM    Leukocyte Esterase SMALL 02/26/2017 05:57 PM    Epithelial cells 2+ 02/26/2017 05:57 PM    Bacteria 1+ 02/26/2017 05:57 PM    WBC 21 to 35 02/26/2017 05:57 PM    RBC NONE 02/26/2017 05:57 PM        Micro  Recent Labs      02/26/17   1757   CULT  [id number]  COLONIES/mL  MIXED GRAM POSITIVE KASHIF, PROBABLE SKIN/GENITAL CONTAMINATION. Recent Labs      02/26/17   1757   CULT  [id number]  COLONIES/mL  MIXED GRAM POSITIVE KASHIF, PROBABLE SKIN/GENITAL CONTAMINATION. EKG  No results found for this or any previous visit. PFT  No flowsheet data found. Other  ASA reactivity:   Pre-albumin:   Ionized Calcium:   NH4:   T3, FT4:  Cortisol:  Urine Osm:  Urine Lytes:   HbA1c:      Ultrasound       LE Doppler       ECHO  No results found for this or any previous visit.      CT (Most Recent)    Results from Hospital Encounter encounter on 03/11/16   CT ABD PELV WO CONT   Narrative EXAM: CT of the abdomen and pelvis    INDICATION: Bilateral flank pain    COMPARISON: 09/08/2015    TECHNIQUE: Helical volumetric scanning through the abdomen and pelvis was  performed without oral or intravenous contrast. Axial, sagittal and coronal  reconstructions were generated. Dose reduction techniques used: Automated  exposure control, adjustment of the mAs and/or kVp according to patient's size,  and/or iterative reconstruction techniques. _______________    FINDINGS:    LOWER CHEST: Within normal range. LIVER, BILIARY: Liver is normal for noncontrast technique. No biliary dilation. Gallbladder is unremarkable. PANCREAS: Normal for noncontrast technique. SPLEEN: Normal for noncontrast technique. ADRENALS: Normal.    KIDNEYS: Renal parenchyma appears normal for noncontrast technique. No renal or  ureteral calculi. No signs of obstructing uropathy. LYMPH NODES: No enlarged lymph nodes. GASTROINTESTINAL TRACT: No bowel dilatation or wall thickening. No adjacent  stranding. The appendix is normal.    PELVIC ORGANS: Small low density in the left ovary suggests a follicle. Small  fat-containing umbilical hernia is present. VASCULATURE: Within normal range. BONES: No acute or aggressive osseous abnormalities identified. OTHER: None.    _______________         Impression IMPRESSION:    No acute process identified. Specifically, no renal or ureteral calculi. Small fat-containing umbilical hernia, without change. A preliminary report was given by the radiology resident on call. XR (Most Recent). CXR  reviewed by me and compared with previous CXR   Results from Hospital Encounter encounter on 02/26/17   XR CHEST PORT   Narrative EXAM: Chest portable    INDICATION: Status post central line insertion    COMPARISON: Two-view chest 6/11/2012    _______________    FINDINGS:    AP portable chest film was performed. Right jugular central line is present with  the tip in good position at the cavoatrial junction. No pneumothorax. Suboptimal  inspiration. No focal infiltrate or effusion.  Heart and pulmonary vascularity  are normal.    _______________         Impression IMPRESSION:      1. Right jugular central line good position. No acute cardiopulmonary disease. Best practice : All below core measures reviewed and addressed:   [x] Antibiotic choice. [x] Severe Sepsis bundles follwed; SIRS screen met? No  [x] Fluids. [x] Lactic acid ordered- initial and repeat Q6hrs if elevated till normalized. [x] Cultures drawn. [x] Antibiotic administered within 1hr-ICU and 3hrs ED. [x] Large bore IV- 2 sites           [x] Pressors aim MAP >65mmHg. [x] Steroids. [x] Glycemic control. [x] Infection control. [x] Mech. Ventilated patients- aim to keep peak plateau pressure 76-42DB H2O. [x] HOB at >= 30 degree. [x] Stress ulcer prophylaxis. [x] DVT prophylaxis. [x] Central Line Bundle Followed. [x] Fan Bundle Followed. [x] Ventilator Bundle Followed.  (Daily sedation holiday to assess readiness for weaning from ventilator & SBT trial starting at 6.00 am, HOB 30-degree elevation, Chlorhexidine mouth washes & Routine oral care every 4 hours, Magnolia tube to suction at 20-30 cm H2O, Maintain Magnolia tube with 5-10ml air every 4 hours, DVT prophylaxis, GI prophylaxis etc.)     [x]See my orders for details    My assessment, plan of care, findings, medications, side effects etc were discussed with:  [x] Nurse [] PT/OT    [x] Respiratory therapy [x] Dr. Aroldo Mo   [x] Pharmacist [x] MDR   [] Family: answered all questions to satisfaction [x] Patient: answered all questions to satisfaction   []  []      [x] Case & management strategies discussed today on multidisciplinary rounds       Joy Clayton NP  2/28/2017

## 2017-02-28 NOTE — PROGRESS NOTES
0 - Family in room talking about pt's son and shoving phone towards pt's face. Took family aside and stated that if the pt wants to see pics of baby then you can show them but if she is not asking for it then just focus on her and recovery. Family states they understand. [phone] - Pt's family in room and belongings were brought in. Informed pt that she may not have belongings. Placed duffle bag with pajamas, food, phone and  in locked cabinet at nurse's station.

## 2017-02-28 NOTE — PROGRESS NOTES
[facility]  Pulmonary, Critical Care, and Sleep Medicine    Name: Oscar Montemayor MRN: [id number]   : 1990 Hospital: [address] [place] [address]   Date: 2017        [facility]                                              Consult requesting physician: Dr. Patsy Sultana  Reason for Consult: Intentional OD    [x] I have reviewed the flowsheet and previous days notes. Events, vitals, medications and notes from last 24 hours reviewed. Care plan discussed with staff, patient, family and on multidisciplinary rounds. IMPRESSION:   · Overdose, intentional/SA with labetalol and ibuprofen, on glucagon gtt-dc'd  · Hypotension due to above, on dopamine-d/c'd, resolved  · Hypomagnesemia, being replaced  · Hypoglycemia, improved with D50, on D5  · +UTI  · UDS +amphetamines, opiates  · Postpartum 3 mos  · Hx HTN  · Hx hypothyroidism  · Hx depression, SI    · Code status: FULL      RECOMMENDATIONS:   · CVS: Off Dopamine. BP ranges 117//71, HR ranges 70's-80's Monitor HD. Posion control following. · Respiratory: Currently stable on RA. O2nc prn for O2 sat>94%. Aspiration precautions. CXR without acute process. · ID:  On macrobid for +UA. Await urine cx. Trend temps, WBC  · Hematology/Oncology: Hgb 10.3 today. Monitor Hb and any active bleeding. · Renal:  Monitor BUN, Cr.   · Endocrine:  Avoid hypoglycemia, Off Glucagon drip, TSH  >100  · Neurology/Pain/Sedation:  Psych consult. Sitter at bedside. Suicide precautions. Poison control following closely. glucagon gtt -d'c'd. Calcium chloride prn. UDS +amphetamines and opioids  · FEN: Diet. Replace lytes per ICU protocol  ·  PPX: lovenox                                                             ·   ·   · Glycemic Control. Glucose stabilizer per ICU protocol when on insulin drip. Maintain blood glucose 140-180. · Replace electrolytes per ICU electrolyte replacement protocol  · Ventilator bundle & Sedation protocol followed. Daily sedation holiday, assessment for readiness for SBT and then re-titrate if required. Chlorhexidine mouth washes. · HOB 30 degree elevation all the time. Aggressive pulmonary toileting. Incentive spirometry when appropriate. Aspiration precautions. · Sepsis bundle and protocol followed. Follow serial lactic acid, frequent BMP check, fluid resuscitation. Follow cultures. Deescalate antibiotic when appropriate. · Rogers bundle followed, remove rogers catheter when not critically ill. · Central Line bundle followed, remove when not needed. · Skin & Wound care. · Weekly prealbumin, nutritional consult. · PT/OT eval and treat. OOB when appropriate. · Further recommendations will be based on the patient's response to recommended treatment and results of the investigation ordered. · Quality Care: PPI, DVT prophylaxis, HOB elevated, Infection control all reviewed and addressed. · Events and notes from last 24 hours reviewed. Care plan discussed with nursing     Subjective/History of Present Illness:     Corina Ricketts has been seen and evaluated in ICU Bed 2707. Patient is a 23 y.o. female with PMHx significant for HTN, thyroid disease, seizure, recently postpartum 3 months ago who presented to the ED with SI after taking labetalol (56 tabs) and ibuprofen (\"1 bottle\"). She was hypotensive in the ED and posion control was contacted. UDS +amphetamines and opiates. UA +. She was started on glucagon and calcium chloride as well as glucogon gtt. R IJ was placed. Pt was transferred to ICU for further evaluation and management. 10/27: Ms Lona Blas currently c/o neck discomfort at R IJ site, denies lightheadedness, dizziness. No SOB, CP/palpitations. HR 60-70's. She became more hypotensive and was started on dopamine.  Additionally, her glucose was noted to be 48 this am and she required D50.    10/28: Pt awake and alert on room air, she report upset no able to go to a BR, otherwise no other complaint verbalized. Sitter at bedside. She denies chest pain, SOB, dizziness or syncope. Hemodynamically stable- off pressors, Glucose 71 with early am labs-had diet ordered. No Known Allergies   Past Medical History:   Diagnosis Date    Endocrine disease Hypothyroidism    Hypertension     Seizures ([address]     epilepsy    Thyroid disease       Past Surgical History:   Procedure Laterality Date    HX TONSILLECTOMY      Age 7      Social History   Substance Use Topics    Smoking status: Former Smoker     Packs/day: 0.50     Years: 5.00    Smokeless tobacco: Never Used    Alcohol use 0.0 oz/week     0 Standard drinks or equivalent per week      Comment: sometimes      Family History   Problem Relation Age of Onset    Diabetes Other      great grandmother    Hypertension Other      family history    Thyroid Disease Other      great grandmother      Prior to Admission medications    Medication Sig Start Date End Date Taking? Authorizing Provider   levothyroxine (SYNTHROID) 300 mcg tablet Take 1 Tab by mouth Daily (before breakfast). 6/23/16  Yes Maria Del Rosario Dunaway MD   labetalol (NORMODYNE) 100 mg tablet 100 mg. 4/30/16  Yes Historical Provider   PNV no.24-iron-folic acid-dha -699 mg-mcg-mg cmpk Take  by mouth. Myles Vanegas MD   levothyroxine (SYNTHROID) 25 mcg tablet Take 1 Tab by mouth Daily (before breakfast). 6/23/16   Maria Del Rosario Dunaway MD     Current Facility-Administered Medications   Medication Dose Route Frequency    glucagon (GLUCAGEN) 0.04 mg/mL in dextrose 5% 250 mL infusion  5 mg/hr IntraVENous CONTINUOUS    enoxaparin (LOVENOX) injection 40 mg  40 mg SubCUTAneous Q24H    sodium chloride (NS) flush 5-10 mL  5-10 mL IntraVENous Q8H    dextrose 5% and 0.9% NaCl infusion  75 mL/hr IntraVENous CONTINUOUS    DOPamine (INTROPIN) 1,600 mcg/mL infusion (STANDARD)  0-10 mcg/kg/min IntraVENous TITRATE    nitrofurantoin (macrocrystal-monohydrate) (MACROBID) capsule 100 mg  100 mg Oral BID       Lines:  All central lines examined by me. No signs of erythema, induration, discharge. Central Venous Catheter:  Triple Lumen Central line 02/27/17 Right Internal jugular (Active)   Central Line Being Utilized Yes 2/27/2017  5:00 AM   Criteria for Appropriate Use Hemodynamically unstable, requiring monitoring lines, vasopressors, or volume resuscitation 2/27/2017  5:00 AM   Site Assessment Clean, dry, & intact 2/27/2017  7:00 AM   Infiltration Assessment 0 2/27/2017  7:00 AM   Affected Extremity/Extremities Color distal to insertion site pink (or appropriate for race); Pulses palpable;Range of motion performed 2/27/2017  7:00 AM   Date of Last Dressing Change 02/27/17 2/27/2017  5:00 AM   Dressing Status Clean, dry, & intact 2/27/2017  7:00 AM   Dressing Type Transparent;Tape;Disk with Chlorhexadine Gluconate (CHG) 2/27/2017  5:00 AM   Action Taken Open ports on tubing capped 2/27/2017  5:00 AM   Proximal Hub Color/Line Status White 2/27/2017  5:00 AM   Positive Blood Return (Medial Site) Yes 2/27/2017  5:00 AM   Medial Hub Color/Line Status Blue 2/27/2017  5:00 AM   Positive Blood Return (Lateral Site) Yes 2/27/2017  5:00 AM   Distal Hub Color/Line Status Brown 2/27/2017  5:00 AM   Positive Blood Return (Site #3) Yes 2/27/2017  5:00 AM   External Catheter Length (cm) 2 centimeters 2/27/2017  5:00 AM   Alcohol Cap Used Yes 2/27/2017  5:00 AM        Peripheral Intravenous Line:  Peripheral IV 02/26/17 Right Forearm (Active)   Site Assessment Clean, dry, & intact 2/27/2017  7:00 AM   Phlebitis Assessment 0 2/27/2017  7:00 AM   Infiltration Assessment 0 2/27/2017  7:00 AM   Dressing Status Clean, dry, & intact 2/27/2017  7:00 AM   Dressing Type Transparent;Tape 2/27/2017  5:00 AM   Hub Color/Line Status Green;Flushed;Capped 2/27/2017  5:00 AM   Action Taken Open ports on tubing capped 2/27/2017  5:00 AM   Alcohol Cap Used Yes 2/27/2017  5:00 AM            Telemetry:normal sinus rhythm    Objective:   Vital Signs:    Visit Vitals    /59    Pulse 79    Temp 97.4 °F (36.3 °C)    Resp 16    Ht 5' 5\" (1.651 m)    Wt 123.6 kg (272 lb 7.8 oz)    LMP 2017    SpO2 99%    Breastfeeding Unknown    BMI 45.34 kg/m2       O2 Device: Room air       Temp (24hrs), Av.9 °F (36.6 °C), Min:97.4 °F (36.3 °C), Max:98.3 °F (36.8 °C)       Intake/Output:   Last shift:       07 - 1900  In: 480 [P.O.:480]  Out: -     Last 3 shifts: 1901 -  0700  In: 5764.1 [P.O.:240; I.V.:5524.1]  Out: 5750 [Urine:5750]      Intake/Output Summary (Last 24 hours) at 17 1109  Last data filed at 17 1102   Gross per 24 hour   Intake          4496.71 ml   Output             3900 ml   Net           596.71 ml       Last 3 Recorded Weights in this Encounter    17 1626 17 0450 17 0600   Weight: 111.1 kg (245 lb) 121.7 kg (268 lb 4.8 oz) 123.6 kg (272 lb 7.8 oz)         Physical Exam:     General/Neurology: Alert, Awake, NAD, FC, oriented, moves all extremities, no FND  Head:   Normocephalic, without obvious abnormality, atraumatic. Eye:   PERRL, no scleral icterus, no pallor, no cyanosis  Nose:   No sinus tenderness, no erythema  Throat:  Lips, mucosa, and tongue normal.   Neck:   Supple, R IJ  Lung:   Adequate air entry bilateral equal, no rales, no rhonchi, no wheezing. No prolonged expiration. Heart:   Regular, no murmur  Abdomen:  Soft, NT, ND, +BS, +obese  Extremities:  No pedal edema, no cyanosis, no clubbing  Pulses: 2+ and symmetric in DP.    Skin:   Color, texture, turgor normal.      Data:       Recent Results (from the past 24 hour(s))   GLUCOSE, POC    Collection Time: 17 12:04 PM   Result Value Ref Range    Glucose (POC) 170 (H) 70 - 110 mg/dL   GLUCOSE, POC    Collection Time: 17  3:25 PM   Result Value Ref Range    Glucose (POC) 219 (H) 70 - 110 mg/dL   MAGNESIUM    Collection Time: 17  8:13 PM   Result Value Ref Range    Magnesium 2.3 1.8 - 2.4 mg/dL   GLUCOSE, POC Collection Time: 02/27/17  8:14 PM   Result Value Ref Range    Glucose (POC) 139 (H) 70 - 110 mg/dL   GLUCOSE, POC    Collection Time: 02/27/17 11:49 PM   Result Value Ref Range    Glucose (POC) 89 70 - 110 mg/dL   CBC WITH AUTOMATED DIFF    Collection Time: 02/28/17  3:40 AM   Result Value Ref Range    WBC 6.6 4.6 - 13.2 K/uL    RBC 3.69 (L) 4.20 - 5.30 M/uL    HGB 10.3 (L) 12.0 - 16.0 g/dL    HCT 32.5 (L) 35.0 - 45.0 %    MCV 88.1 74.0 - 97.0 FL    MCH 27.9 24.0 - 34.0 PG    MCHC 31.7 31.0 - 37.0 g/dL    RDW 15.9 (H) 11.6 - 14.5 %    PLATELET 872 936 - 384 K/uL    MPV 12.0 (H) 9.2 - 11.8 FL    NEUTROPHILS 67 40 - 73 %    LYMPHOCYTES 25 21 - 52 %    MONOCYTES 7 3 - 10 %    EOSINOPHILS 1 0 - 5 %    BASOPHILS 0 0 - 2 %    ABS. NEUTROPHILS 4.4 1.8 - 8.0 K/UL    ABS. LYMPHOCYTES 1.7 0.9 - 3.6 K/UL    ABS. MONOCYTES 0.4 0.05 - 1.2 K/UL    ABS. EOSINOPHILS 0.1 0.0 - 0.4 K/UL    ABS. BASOPHILS 0.0 0.0 - 0.06 K/UL    DF AUTOMATED     HEPATIC FUNCTION PANEL    Collection Time: 02/28/17  3:40 AM   Result Value Ref Range    Protein, total 6.1 (L) 6.4 - 8.2 g/dL    Albumin 3.3 (L) 3.4 - 5.0 g/dL    Globulin 2.8 2.0 - 4.0 g/dL    A-G Ratio 1.2 0.8 - 1.7      Bilirubin, total 0.4 0.2 - 1.0 MG/DL    Bilirubin, direct 0.1 0.0 - 0.2 MG/DL    Alk.  phosphatase 56 45 - 117 U/L    AST (SGOT) 8 (L) 15 - 37 U/L    ALT (SGPT) 15 13 - 56 U/L   CALCIUM, IONIZED    Collection Time: 02/28/17  3:40 AM   Result Value Ref Range    Ionized Calcium 1.21 1.12 - 1.84 MMOL/L   METABOLIC PANEL, BASIC    Collection Time: 02/28/17  3:40 AM   Result Value Ref Range    Sodium 142 136 - 145 mmol/L    Potassium 3.5 3.5 - 5.5 mmol/L    Chloride 112 (H) 100 - 108 mmol/L    CO2 19 (L) 21 - 32 mmol/L    Anion gap 11 3.0 - 18 mmol/L    Glucose 71 (L) 74 - 99 mg/dL    BUN 11 7.0 - 18 MG/DL    Creatinine 0.86 0.6 - 1.3 MG/DL    BUN/Creatinine ratio 13 12 - 20      GFR est AA >60 >60 ml/min/1.73m2    GFR est non-AA >60 >60 ml/min/1.73m2    Calcium 8.2 (L) 8.5 - 10.1 MG/DL         Chemistry Recent Labs      02/28/17   0340 02/27/17 2013 02/27/17   0530  02/26/17   1610   GLU  71*   --   107*  97   NA  142   --   143  139   K  3.5   --   3.5  3.7   CL  112*   --   110*  104   CO2  19*   --   19*  28   BUN  11   --   17  16   CREA  0.86   --   1.00  1.07   CA  8.2*   --   8.5  9.1   MG   --   2.3  1.7*   --    AGAP  11   --   14  7   BUCR  13   --   17  15   AP  56   --    --   67   TP  6.1*   --    --   7.4   ALB  3.3*   --    --   4.2   GLOB  2.8   --    --   3.2   AGRAT  1.2   --    --   1.3        Lactic Acid Lactic acid   Date Value Ref Range Status   06/17/2013 0.7 0.4 - 2.0 MMOL/L Final     No results for input(s): LAC in the last 72 hours. Liver Enzymes Protein, total   Date Value Ref Range Status   02/28/2017 6.1 (L) 6.4 - 8.2 g/dL Final     Albumin   Date Value Ref Range Status   02/28/2017 3.3 (L) 3.4 - 5.0 g/dL Final     Globulin   Date Value Ref Range Status   02/28/2017 2.8 2.0 - 4.0 g/dL Final     A-G Ratio   Date Value Ref Range Status   02/28/2017 1.2 0.8 - 1.7   Final     AST (SGOT)   Date Value Ref Range Status   02/28/2017 8 (L) 15 - 37 U/L Final     Alk.  phosphatase   Date Value Ref Range Status   02/28/2017 56 45 - 117 U/L Final     Recent Labs      02/28/17 0340 02/26/17   1610   TP  6.1*  7.4   ALB  3.3*  4.2   GLOB  2.8  3.2   AGRAT  1.2  1.3   SGOT  8*  13*   AP  56  67        CBC w/Diff Recent Labs      02/28/17 0340 02/27/17 0530  02/26/17   1610   WBC  6.6  9.8  8.2   RBC  3.69*  3.18*  4.17*   HGB  10.3*  8.9*  11.7*   HCT  32.5*  28.9*  37.2   PLT  229  203  259   GRANS  67   --   63   LYMPH  25   --   30   EOS  1   --   1        Cardiac Enzymes No results found for: CPK, CKMMB, CKMB, RCK3, CKMBT, CKNDX, CKND1, SLOAN, TROPT, TROIQ, BOAZ, TROPT, TNIPOC, BNP, BNPP     BNP No results found for: BNP, BNPP, XBNPT     Coagulation Recent Labs      02/27/17   0530   APTT  31.8         Thyroid  Lab Results   Component Value Date/Time    TSH >100.00 02/27/2017 05:30 AM       No results found for: T4     Lipid Panel No results found for: CHOL, CHOLPOCT, CHOLX, CHLST, CHOLV, 289848, HDL, LDL, NLDLCT, DLDL, LDLC, DLDLP, 573601, VLDLC, VLDL, TGL, TGLX, TRIGL, [id number], TRIGP, TGLPOCT, [id number], CHHD, CHHDX     ABG No results for input(s): PHI, PHI, PCO2I, PO2, PO2I, HCO3, HCO3I, FIO2, FIO2I in the last 72 hours. No lab exists for component: POC2     Urinalysis Lab Results   Component Value Date/Time    Color YELLOW 02/26/2017 05:57 PM    Appearance CLEAR 02/26/2017 05:57 PM    Specific gravity >1.030 02/26/2017 05:57 PM    pH (UA) 5.5 02/26/2017 05:57 PM    Protein NEGATIVE  02/26/2017 05:57 PM    Glucose NEGATIVE  02/26/2017 05:57 PM    Ketone NEGATIVE  02/26/2017 05:57 PM    Bilirubin NEGATIVE  02/26/2017 05:57 PM    Urobilinogen 0.2 02/26/2017 05:57 PM    Nitrites NEGATIVE  02/26/2017 05:57 PM    Leukocyte Esterase SMALL 02/26/2017 05:57 PM    Epithelial cells 2+ 02/26/2017 05:57 PM    Bacteria 1+ 02/26/2017 05:57 PM    WBC 21 to 35 02/26/2017 05:57 PM    RBC NONE 02/26/2017 05:57 PM        Micro  Recent Labs      02/26/17   1757   CULT  [id number]  COLONIES/mL  MIXED GRAM POSITIVE KASHIF, PROBABLE SKIN/GENITAL CONTAMINATION. Recent Labs      02/26/17   1757   CULT  [id number]  COLONIES/mL  MIXED GRAM POSITIVE KASHIF, PROBABLE SKIN/GENITAL CONTAMINATION. EKG  No results found for this or any previous visit. PFT  No flowsheet data found. Other  ASA reactivity:   Pre-albumin:   Ionized Calcium:   NH4:   T3, FT4:  Cortisol:  Urine Osm:  Urine Lytes:   HbA1c:      Ultrasound       LE Doppler       ECHO  No results found for this or any previous visit.      CT (Most Recent)    Results from Hospital Encounter encounter on 03/11/16   CT ABD PELV WO CONT   Narrative EXAM: CT of the abdomen and pelvis    INDICATION: Bilateral flank pain    COMPARISON: 09/08/2015    TECHNIQUE: Helical volumetric scanning through the abdomen and pelvis was  performed without oral or intravenous contrast. Axial, sagittal and coronal  reconstructions were generated. Dose reduction techniques used: Automated  exposure control, adjustment of the mAs and/or kVp according to patient's size,  and/or iterative reconstruction techniques. _______________    FINDINGS:    LOWER CHEST: Within normal range. LIVER, BILIARY: Liver is normal for noncontrast technique. No biliary dilation. Gallbladder is unremarkable. PANCREAS: Normal for noncontrast technique. SPLEEN: Normal for noncontrast technique. ADRENALS: Normal.    KIDNEYS: Renal parenchyma appears normal for noncontrast technique. No renal or  ureteral calculi. No signs of obstructing uropathy. LYMPH NODES: No enlarged lymph nodes. GASTROINTESTINAL TRACT: No bowel dilatation or wall thickening. No adjacent  stranding. The appendix is normal.    PELVIC ORGANS: Small low density in the left ovary suggests a follicle. Small  fat-containing umbilical hernia is present. VASCULATURE: Within normal range. BONES: No acute or aggressive osseous abnormalities identified. OTHER: None.    _______________         Impression IMPRESSION:    No acute process identified. Specifically, no renal or ureteral calculi. Small fat-containing umbilical hernia, without change. A preliminary report was given by the radiology resident on call. XR (Most Recent). CXR  reviewed by me and compared with previous CXR   Results from Hospital Encounter encounter on 02/26/17   XR CHEST PORT   Narrative EXAM: Chest portable    INDICATION: Status post central line insertion    COMPARISON: Two-view chest 6/11/2012    _______________    FINDINGS:    AP portable chest film was performed. Right jugular central line is present with  the tip in good position at the cavoatrial junction. No pneumothorax. Suboptimal  inspiration. No focal infiltrate or effusion.  Heart and pulmonary vascularity  are normal.    _______________ Impression IMPRESSION:      1. Right jugular central line good position. No acute cardiopulmonary disease. Best practice : All below core measures reviewed and addressed:   [x] Antibiotic choice. [x] Severe Sepsis bundles follwed; SIRS screen met? No  [x] Fluids. [x] Lactic acid ordered- initial and repeat Q6hrs if elevated till normalized. [x] Cultures drawn. [x] Antibiotic administered within 1hr-ICU and 3hrs ED. [x] Large bore IV- 2 sites           [x] Pressors aim MAP >65mmHg. [x] Steroids. [x] Glycemic control. [x] Infection control. [x] Mech. Ventilated patients- aim to keep peak plateau pressure 56-70LO H2O. [x] HOB at >= 30 degree. [x] Stress ulcer prophylaxis. [x] DVT prophylaxis. [x] Central Line Bundle Followed. [x] Fan Bundle Followed. [x] Ventilator Bundle Followed.  (Daily sedation holiday to assess readiness for weaning from ventilator & SBT trial starting at 6.00 am, HOB 30-degree elevation, Chlorhexidine mouth washes & Routine oral care every 4 hours, Steffi tube to suction at 20-30 cm H2O, Maintain Steffi tube with 5-10ml air every 4 hours, DVT prophylaxis, GI prophylaxis etc.)     [x]See my orders for details    My assessment, plan of care, findings, medications, side effects etc were discussed with:  [x] Nurse [] PT/OT    [x] Respiratory therapy [x] Dr. Delgado Mention   [x] Pharmacist [x] MDR   [] Family: answered all questions to satisfaction [x] Patient: answered all questions to satisfaction   []  []      [x] Case & management strategies discussed today on multidisciplinary rounds       Elton Paiz NP  2/28/2017

## 2017-02-28 NOTE — PROGRESS NOTES
1930  Bedside SBAR report received from Quintin Jordan RN on this patient. Family is present and getting ready to leave the dg. 2000  Assessment performed. Patient denies nausea, pain, numbing, tingling and denies any thoughts of hurting herself. Sitter at bedside.   Magnesium lab drawn from [facility] in [place]

## 2017-02-28 NOTE — CONSULTS
Name: James Basurto  Date: 17  Time: 2:35p  : [birth year]    Chief Complaint: suicide attempt by OD    History of Present Illness: 24 y/o female with a h/o depression and one prior suicide attempt by OD at age 13 who is currently admitted after a serious suicide attempt by OD with intent to die. Patient reports she has been increasingly depressed, hopeless, helpless, and suicide since the birth of her son and yesterday I couldnt take it anymore.  Patient is not in current treatment and is voluntary for inpatient hospitalization. Patient has tried SSRIs in past without success. Patient has been taking unprescribed Adderal and Percocet and reports Adderall has been helpful in improving energy and mood. SI/HI/Self harm: attempt with high intent by OD, 1 prior attempt, denies h/o HI    Violence: denied    Access to weapons: denied    Legal: denied    Psychiatric History/Treatment History:  no current treatment    Drug/Alcohol History:  Adderral, Welbutrin    Medical History: hypothyroid    Medications & Freq: levothyroxine 300ug    Allergies: denied    Family Psych History/History of suicide: substance - mother, father; maternal aunts - cutting, attempts    Social History: lives with parents, , and son - 3 months   Employment: unemployed    Education: 10th - depression, GED - some college   Stressors: severe - exacerbation of mental illness    Mental Status Exam:   Appearance and attire: overweight, hospital attire  Attitude and behavior: cooperative  Speech: slow, monotonic  Affect and mood: depressed, hopeless, helpless  Association and thought processes: organized, negative  Thought content: denies current active SI but reports recent SI culminating in attempt on   Perception: denies current or h/o AH/VH  Sensorium, memory, and orientation: alert and oriented  Intellectual functioning: average  Insight and judgment: impaired    Impression/Risk Assessment: 24 y/o female with a h/o depression and one prior suicide attempt by OD at age 13 who is currently admitted after a serious suicide attempt by OD with intent to die. Patient requires inpatient psych admission for safety and stabilization and is voluntary for treatment.     Diagnosis: MDD severe without psychotic features; amphetamine use disorder, moderate; opiate use disorder,   moderate    Treatment Recommendations: voluntary inpatient psych admission     Pharmacological: start Wellbutrin 100mg bid for anhedonic depression - monitor for increased depression    Therapy: supportive, insight oriented    Level of Care: inpatient

## 2017-02-28 NOTE — DIABETES MGMT
NUTRITIONAL ASSESSMENT AND GLYCEMIC CONTROL PLAN OF CARE     Chicho Calloway           26 y.o.           2/26/2017                 1. Overdose, intentional self-harm, initial encounter ([facility].)    2. Acute cystitis without hematuria    3. Suicide attempt by beta blocker overdose, initial encounter ([facility].)       [x]  No Cultural, Pentecostal or ethnic dietary need identified. []  Cultural, Pentecostal and ethnic food preferences identified and addressed    [x]  Participated in discharge planning/Interdisciplinary rounds   Food allergies: []  No        []  Yes-  ASSESSMENT:   Pt is overweight related to excess caloric intake as evidenced by 218% ideal weight and BMI= 45.3kg/m2. Pt meets criteria for morbid obesity. Pt seen at breakfast meal,reporting liquid diet is inadequate. Pt is requesting diet advancement    INTERVENTIONS/PLAN:   Clear liquids to be advanced to regular diet  with suicide precautions. Encouraged increased intake at meals to meet calorie and protein requirements.     SUBJECTIVE/OBJECTIVE:   Information obtained from: Pt    Diet: Patient Vitals for the past 100 hrs:   % Diet Eaten   02/28/17 1102 40 %   02/27/17 0818 0 %     Medications: [x]                Reviewed     Most Recent POC Glucose: Recent Labs      02/28/17   0340  02/27/17   0530  02/26/17   1610   GLU  71*  107*  97         Labs:   No results found for: HBA1C, HGBE8, LTP5ABGD  Lab Results   Component Value Date/Time    Sodium 142 02/28/2017 03:40 AM    Potassium 3.5 02/28/2017 03:40 AM    Chloride 112 02/28/2017 03:40 AM    CO2 19 02/28/2017 03:40 AM    Anion gap 11 02/28/2017 03:40 AM    Glucose 71 02/28/2017 03:40 AM    BUN 11 02/28/2017 03:40 AM    Creatinine 0.86 02/28/2017 03:40 AM    Calcium 8.2 02/28/2017 03:40 AM    Magnesium 2.3 02/27/2017 08:13 PM    Albumin 3.3 02/28/2017 03:40 AM     Anthropometrics: IBW : 56.7 kg (125 lb), % IBW (Calculated): 217.99 %, BMI (calculated): 45.3  Wt Readings from Last 1 Encounters:   02/28/17 123.6 kg (272 lb 7.8 oz)    Ht Readings from Last 1 Encounters:   02/28/17 5' 5\" (1.651 m)       Estimated Nutrition Needs: 1520 Kcals/day, Protein (g): 60 g Fluid (ml): 1800 ml  Based on:   []          Actual BW    [x]          IBW   []            Adjusted BW    Nutrition Diagnoses: Morbid Obesity As stated above. Nutrition Interventions:  Goal:     Intake will meet >75% of energy and protein requirements by  3/4. Gradual weight loss post discharge 1 lb per week 3/8.      Nutrition Monitoring and Evaluation      []     Monitor po intake on meal rounds  [x]     Continue inpatient monitoring and intervention  []     Other:      Nutrition Risk:  []   High     []  Moderate    [x]  Minimal/Uncompromised    Ashley Bennett RD

## 2017-02-28 NOTE — PROGRESS NOTES
1330 Patient arrived to the unit in stable condition. Call bell is within reach. Sitter at the bedside. 1430 Tele applied to the patient and was confirmed with Telemetry. 12 Dr. Gem Quiles from [address] [facility] is speaking with the patient at this time. 1515 Patient is watching tv at this time with call bell in reach. 1615 Patient is ambulating to the bathroom at this time. Sitter is at the bedside. Call bell is within reach. 1800 Patient is doing well at this time. Call bell is within reach. 1845 Patients family is here visiting with her at this time. Call bell is within reach. Bedside and Verbal shift change report given to Phillip Mcmanus RN (oncoming nurse) by Liz Carlson RN (offgoing nurse). Report included the following information SBAR, Kardex and MAR.

## 2017-02-28 NOTE — ROUTINE PROCESS
TRANSFER - IN REPORT:    Verbal report received from Rere Vora RN(name) on Gui Almanzar  being received from ICU(unit) for routine progression of care      Report consisted of patients Situation, Background, Assessment and   Recommendations(SBAR). Information from the following report(s) SBAR, Kardex and MAR was reviewed with the receiving nurse. Opportunity for questions and clarification was provided. Assessment completed upon patients arrival to unit and care assumed.

## 2017-02-28 NOTE — PROGRESS NOTES
0000 Assumed care of the pt, Assessments completed and charted. Resting quietly in the bed, VSS, no distress noted or reported. Sitter is at the bedside assisting pt as needed. 0700 Pt reported good rest throughout the evening, no issues to report. No indication or report of SI or HI. Bedside and Verbal shift change report given to Rafael Caldwell RN (oncoming nurse) by Kay Chong RN (offgoing nurse). Report included the following information SBAR, Kardex, Intake/Output, MAR, Recent Results and Cardiac Rhythm 1st Degree AVB.

## 2017-02-28 NOTE — ROUTINE PROCESS
TRANSFER - OUT REPORT:    Verbal report given to Cortney VARMA RN(name) on Dhara Lynch  being transferred to [facility]) for routine progression of care       Report consisted of patients Situation, Background, Assessment and   Recommendations(SBAR). Information from the following report(s) SBAR, Kardex, Intake/Output, MAR, Recent Results, Med Rec Status and Cardiac Rhythm SR was reviewed with the receiving nurse.     Lines:   Triple Lumen Central line 02/27/17 Right Internal jugular (Active)   Central Line Being Utilized Yes 2/28/2017  8:00 AM   Criteria for Appropriate Use Hemodynamically unstable, requiring monitoring lines, vasopressors, or volume resuscitation 2/28/2017  8:00 AM   Site Assessment Clean, dry, & intact 2/28/2017  8:00 AM   Infiltration Assessment 0 2/28/2017  8:00 AM   Affected Extremity/Extremities Color distal to insertion site pink (or appropriate for race) 2/28/2017  8:00 AM   Date of Last Dressing Change 02/27/17 2/28/2017  8:00 AM   Dressing Status Clean, dry, & intact 2/28/2017  8:00 AM   Dressing Type Disk with Chlorhexadine Gluconate (CHG) 2/28/2017  8:00 AM   Action Taken Open ports on tubing capped 2/28/2017  8:00 AM   Proximal Hub Color/Line Status White 2/28/2017  8:00 AM   Positive Blood Return (Medial Site) Yes 2/28/2017  8:00 AM   Medial Hub Color/Line Status Blue 2/28/2017  8:00 AM   Positive Blood Return (Lateral Site) Yes 2/28/2017  8:00 AM   Distal Hub Color/Line Status Brown 2/28/2017  8:00 AM   Positive Blood Return (Site #3) Yes 2/28/2017  8:00 AM   External Catheter Length (cm) 2 centimeters 2/27/2017  5:00 AM   Alcohol Cap Used Yes 2/28/2017 12:00 AM       Peripheral IV 02/26/17 Right Forearm (Active)   Site Assessment Clean, dry, & intact 2/28/2017  8:00 AM   Phlebitis Assessment 0 2/28/2017  8:00 AM   Infiltration Assessment 0 2/28/2017  8:00 AM   Dressing Status Clean, dry, & intact 2/28/2017  8:00 AM   Dressing Type Transparent 2/28/2017  8:00 AM   Hub Color/Line Status Green 2/28/2017  8:00 AM   Action Taken Open ports on tubing capped 2/28/2017  8:00 AM   Alcohol Cap Used Yes 2/28/2017  8:00 AM        Opportunity for questions and clarification was provided.       Patient transported with:   Registered Nurse, pt belongings, pt chart, via wheelchair

## 2017-02-28 NOTE — PROGRESS NOTES
[address] [place], [place] [address] Group  Hospitalist Division        Inpatient Daily Progress Note    Daily progress Note    Patient: Kamala Peñaloza MRN: [id number]  CSN: [id number]    YOB: 1990  Age: 23 y.o. Sex: female    DOA: 2/26/2017 LOS:  LOS: 1 day                    Chief Complaint:  Suicide attempt       Subjective:      Transferred out of ICU. Sitter at bedside. In NAD. Objective:      Visit Vitals    /79 (BP 1 Location: Left arm)    Pulse 80    Temp 99.1 °F (37.3 °C)    Resp 18    Ht 5' 5\" (1.651 m)    Wt 123.6 kg (272 lb 7.8 oz)    SpO2 97%    Breastfeeding Unknown    BMI 45.34 kg/m2       Physical Exam:  General appearance: drowsy, cooperative, no distress, appears stated age  Head: Normocephalic, without obvious abnormality, atraumatic  Lungs: clear to auscultation bilaterally  Heart: regular rate and rhythm, S1, S2 normal, no murmur, click, rub or gallop  Abdomen: soft, non tender, non distended. Normoactive bowel sounds  Extremities: extremities normal, atraumatic, no cyanosis or edema  Skin: Skin color, texture, turgor normal. No rashes or lesions  Neurologic: Grossly normal          Intake and Output:  Current Shift:  02/28 0701 - 02/28 1900  In: 780 [P.O.:480; I.V.:300]  Out: -   Last three shifts:  02/26 1901 - 02/28 0700  In: 5764.1 [P.O.:240;  I.V.:5524.1]  Out: 7665 [Urine:5750]    Recent Results (from the past 24 hour(s))   GLUCOSE, POC    Collection Time: 02/27/17  3:25 PM   Result Value Ref Range    Glucose (POC) 219 (H) 70 - 110 mg/dL   MAGNESIUM    Collection Time: 02/27/17  8:13 PM   Result Value Ref Range    Magnesium 2.3 1.8 - 2.4 mg/dL   GLUCOSE, POC    Collection Time: 02/27/17  8:14 PM   Result Value Ref Range    Glucose (POC) 139 (H) 70 - 110 mg/dL   GLUCOSE, POC    Collection Time: 02/27/17 11:49 PM   Result Value Ref Range    Glucose (POC) 89 70 - 110 mg/dL   CBC WITH AUTOMATED DIFF    Collection Time: 02/28/17  3:40 AM   Result Value Ref Range    WBC 6.6 4.6 - 13.2 K/uL    RBC 3.69 (L) 4.20 - 5.30 M/uL    HGB 10.3 (L) 12.0 - 16.0 g/dL    HCT 32.5 (L) 35.0 - 45.0 %    MCV 88.1 74.0 - 97.0 FL    MCH 27.9 24.0 - 34.0 PG    MCHC 31.7 31.0 - 37.0 g/dL    RDW 15.9 (H) 11.6 - 14.5 %    PLATELET 160 831 - 334 K/uL    MPV 12.0 (H) 9.2 - 11.8 FL    NEUTROPHILS 67 40 - 73 %    LYMPHOCYTES 25 21 - 52 %    MONOCYTES 7 3 - 10 %    EOSINOPHILS 1 0 - 5 %    BASOPHILS 0 0 - 2 %    ABS. NEUTROPHILS 4.4 1.8 - 8.0 K/UL    ABS. LYMPHOCYTES 1.7 0.9 - 3.6 K/UL    ABS. MONOCYTES 0.4 0.05 - 1.2 K/UL    ABS. EOSINOPHILS 0.1 0.0 - 0.4 K/UL    ABS. BASOPHILS 0.0 0.0 - 0.06 K/UL    DF AUTOMATED     HEPATIC FUNCTION PANEL    Collection Time: 02/28/17  3:40 AM   Result Value Ref Range    Protein, total 6.1 (L) 6.4 - 8.2 g/dL    Albumin 3.3 (L) 3.4 - 5.0 g/dL    Globulin 2.8 2.0 - 4.0 g/dL    A-G Ratio 1.2 0.8 - 1.7      Bilirubin, total 0.4 0.2 - 1.0 MG/DL    Bilirubin, direct 0.1 0.0 - 0.2 MG/DL    Alk.  phosphatase 56 45 - 117 U/L    AST (SGOT) 8 (L) 15 - 37 U/L    ALT (SGPT) 15 13 - 56 U/L   CALCIUM, IONIZED    Collection Time: 02/28/17  3:40 AM   Result Value Ref Range    Ionized Calcium 1.21 1.12 - 8.50 MMOL/L   METABOLIC PANEL, BASIC    Collection Time: 02/28/17  3:40 AM   Result Value Ref Range    Sodium 142 136 - 145 mmol/L    Potassium 3.5 3.5 - 5.5 mmol/L    Chloride 112 (H) 100 - 108 mmol/L    CO2 19 (L) 21 - 32 mmol/L    Anion gap 11 3.0 - 18 mmol/L    Glucose 71 (L) 74 - 99 mg/dL    BUN 11 7.0 - 18 MG/DL    Creatinine 0.86 0.6 - 1.3 MG/DL    BUN/Creatinine ratio 13 12 - 20      GFR est AA >60 >60 ml/min/1.73m2    GFR est non-AA >60 >60 ml/min/1.73m2    Calcium 8.2 (L) 8.5 - 10.1 MG/DL   GLUCOSE, POC    Collection Time: 02/28/17  2:12 PM   Result Value Ref Range    Glucose (POC) 97 70 - 110 mg/dL           Lab Results   Component Value Date/Time    Glucose 71 02/28/2017 03:40 AM    Glucose 107 02/27/2017 05:30 AM    Glucose 97 02/26/2017 04:10 PM    Glucose 88 04/04/2016 01:20 PM    Glucose 91 03/11/2016 09:00 PM        Assessment/Plan:     Patient Active Problem List   Diagnosis Code    THYROID DYSFUNC-ANTEPART O99.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 1:1 sitter   Hypotension 2/2 above: resolved. Dopamine discontinued. Monitor BP and HR   UTI: continue Macrobid   Hypoglycemia   HX Hypothyroidism   Hx HTN  DVT prophylaxis: Lovenox    Morbid obesity with BMI 44   Psych consult pending       JENNIFER Mckinney-BEE Draper [address]  Pager:  [phone]  Office:  [phone]       I examined the patient , reviewed the history, labs, and radiology reports  independently. I have reviewed the NP documentation, agree with the documentation, medical decision making and treatment plan as outlined by my NP.     Анна Tapia MD

## 2017-02-28 NOTE — PROGRESS NOTES
Chart reviewed and pt discussed at rounds. Psych consult has been done but I don't see any results yet in chart. Pt to transfer to regular floor this a.m. . Will continue to monitor for needs.

## 2017-02-28 NOTE — ROUTINE PROCESS
Bedside and Verbal shift change report given to [place] (oncoming nurse) by Ghanshyam Han RN (offgoing nurse). Report included the following information SBAR, Kardex, Intake/Output, MAR, Recent Results and Med Rec Status.

## 2017-02-28 NOTE — PROGRESS NOTES
0800-Received pt resting in bed with eyes open, no sign of distress, vs stable, sitter at bedside, calm and cooperative, denies pain, states \"I'm just hungry. \" family updated via phone, will continue to monitor  1100-Resting in bed with eyes open, no sign of distress, tolerated liquid diet well  1330-Pt belongings removed from locked cabinet and moved to [address] [place] [address]

## 2017-03-01 ENCOUNTER — HOSPITAL ENCOUNTER (INPATIENT)
Age: 27
LOS: 3 days | Discharge: HOME | DRG: 751 | End: 2017-03-04
Attending: PSYCHIATRY & NEUROLOGY | Admitting: PSYCHIATRY & NEUROLOGY
Payer: MEDICAID

## 2017-03-01 VITALS
WEIGHT: 272.49 LBS | HEART RATE: 73 BPM | HEIGHT: 65 IN | OXYGEN SATURATION: 94 % | SYSTOLIC BLOOD PRESSURE: 132 MMHG | RESPIRATION RATE: 16 BRPM | BODY MASS INDEX: 45.4 KG/M2 | TEMPERATURE: 97.9 F | DIASTOLIC BLOOD PRESSURE: 85 MMHG

## 2017-03-01 PROBLEM — F32.A DEPRESSION: Status: ACTIVE | Noted: 2017-03-01

## 2017-03-01 LAB
ANION GAP BLD CALC-SCNC: 11 MMOL/L (ref 3–18)
BASOPHILS # BLD AUTO: 0 K/UL (ref 0–0.06)
BASOPHILS # BLD: 1 % (ref 0–2)
BUN SERPL-MCNC: 12 MG/DL (ref 7–18)
BUN/CREAT SERPL: 11 (ref 12–20)
CALCIUM SERPL-MCNC: 8.1 MG/DL (ref 8.5–10.1)
CHLORIDE SERPL-SCNC: 112 MMOL/L (ref 100–108)
CO2 SERPL-SCNC: 21 MMOL/L (ref 21–32)
CREAT SERPL-MCNC: 1.13 MG/DL (ref 0.6–1.3)
DIFFERENTIAL METHOD BLD: ABNORMAL
EOSINOPHIL # BLD: 0.1 K/UL (ref 0–0.4)
EOSINOPHIL NFR BLD: 2 % (ref 0–5)
ERYTHROCYTE [DISTWIDTH] IN BLOOD BY AUTOMATED COUNT: 16.9 % (ref 11.6–14.5)
GLUCOSE BLD STRIP.AUTO-MCNC: 104 MG/DL (ref 70–110)
GLUCOSE SERPL-MCNC: 91 MG/DL (ref 74–99)
HCT VFR BLD AUTO: 32.5 % (ref 35–45)
HGB BLD-MCNC: 10.2 G/DL (ref 12–16)
LYMPHOCYTES # BLD AUTO: 39 % (ref 21–52)
LYMPHOCYTES # BLD: 2.2 K/UL (ref 0.9–3.6)
MCH RBC QN AUTO: 27.5 PG (ref 24–34)
MCHC RBC AUTO-ENTMCNC: 31.4 G/DL (ref 31–37)
MCV RBC AUTO: 87.6 FL (ref 74–97)
MONOCYTES # BLD: 0.5 K/UL (ref 0.05–1.2)
MONOCYTES NFR BLD AUTO: 8 % (ref 3–10)
NEUTS SEG # BLD: 2.8 K/UL (ref 1.8–8)
NEUTS SEG NFR BLD AUTO: 50 % (ref 40–73)
PLATELET # BLD AUTO: 268 K/UL (ref 135–420)
PMV BLD AUTO: 11.8 FL (ref 9.2–11.8)
POTASSIUM SERPL-SCNC: 3.5 MMOL/L (ref 3.5–5.5)
RBC # BLD AUTO: 3.71 M/UL (ref 4.2–5.3)
SODIUM SERPL-SCNC: 144 MMOL/L (ref 136–145)
WBC # BLD AUTO: 5.6 K/UL (ref 4.6–13.2)

## 2017-03-01 PROCEDURE — 74011250636 HC RX REV CODE- 250/636: Performed by: HOSPITALIST

## 2017-03-01 PROCEDURE — 36415 COLL VENOUS BLD VENIPUNCTURE: CPT | Performed by: PHYSICIAN ASSISTANT

## 2017-03-01 PROCEDURE — 74011250637 HC RX REV CODE- 250/637: Performed by: PSYCHIATRY & NEUROLOGY

## 2017-03-01 PROCEDURE — 85025 COMPLETE CBC W/AUTO DIFF WBC: CPT | Performed by: PHYSICIAN ASSISTANT

## 2017-03-01 PROCEDURE — 80048 BASIC METABOLIC PNL TOTAL CA: CPT | Performed by: PHYSICIAN ASSISTANT

## 2017-03-01 PROCEDURE — 74011250637 HC RX REV CODE- 250/637: Performed by: EMERGENCY MEDICINE

## 2017-03-01 PROCEDURE — 65220000003 HC RM SEMIPRIVATE PSYCH

## 2017-03-01 PROCEDURE — 82962 GLUCOSE BLOOD TEST: CPT

## 2017-03-01 RX ORDER — NITROFURANTOIN MACROCRYSTALS 50 MG/1
100 CAPSULE ORAL 2 TIMES DAILY
Status: DISCONTINUED | OUTPATIENT
Start: 2017-03-01 | End: 2017-03-04 | Stop reason: HOSPADM

## 2017-03-01 RX ORDER — HALOPERIDOL 5 MG/ML
5 INJECTION INTRAMUSCULAR
Status: DISCONTINUED | OUTPATIENT
Start: 2017-03-01 | End: 2017-03-04 | Stop reason: HOSPADM

## 2017-03-01 RX ORDER — TRAZODONE HYDROCHLORIDE 50 MG/1
50 TABLET ORAL
Status: DISCONTINUED | OUTPATIENT
Start: 2017-03-01 | End: 2017-03-04 | Stop reason: HOSPADM

## 2017-03-01 RX ORDER — NITROFURANTOIN 25; 75 MG/1; MG/1
100 CAPSULE ORAL 2 TIMES DAILY
Qty: 10 CAP | Refills: 0 | Status: SHIPPED | OUTPATIENT
Start: 2017-03-01 | End: 2017-03-27 | Stop reason: ALTCHOICE

## 2017-03-01 RX ORDER — LORAZEPAM 1 MG/1
1-2 TABLET ORAL
Status: DISCONTINUED | OUTPATIENT
Start: 2017-03-01 | End: 2017-03-04 | Stop reason: HOSPADM

## 2017-03-01 RX ORDER — HALOPERIDOL 5 MG/1
5 TABLET ORAL
Status: DISCONTINUED | OUTPATIENT
Start: 2017-03-01 | End: 2017-03-04 | Stop reason: HOSPADM

## 2017-03-01 RX ORDER — LEVOTHYROXINE SODIUM 150 UG/1
300 TABLET ORAL
Status: DISCONTINUED | OUTPATIENT
Start: 2017-03-02 | End: 2017-03-04 | Stop reason: HOSPADM

## 2017-03-01 RX ORDER — LORAZEPAM 2 MG/ML
1-2 INJECTION INTRAMUSCULAR
Status: DISCONTINUED | OUTPATIENT
Start: 2017-03-01 | End: 2017-03-04 | Stop reason: HOSPADM

## 2017-03-01 RX ADMIN — ENOXAPARIN SODIUM 40 MG: 40 INJECTION SUBCUTANEOUS at 02:02

## 2017-03-01 RX ADMIN — NITROFURANTOIN (MONOHYDRATE/MACROCRYSTALS) 100 MG: 75; 25 CAPSULE ORAL at 09:18

## 2017-03-01 RX ADMIN — TRAZODONE HYDROCHLORIDE 50 MG: 50 TABLET ORAL at 21:44

## 2017-03-01 RX ADMIN — NITROFURANTOIN MACROCRYSTALS 100 MG: 50 CAPSULE ORAL at 21:43

## 2017-03-01 NOTE — IP AVS SNAPSHOT
Alexx Velázquez 
 
 
 [address] [place] [address] Patient: Reed Kaur MRN: [id number] AGA:[birth year] You are allergic to the following No active allergies Recent Documentation Height Weight BMI OB Status Smoking Status 1.575 m 111.1 kg 44.81 kg/m2 Having regular periods Former Smoker Emergency Contacts Name Discharge Info Relation Home Work Mobile Mack Mcduffie  Spouse [3] [phone] [address] CAREGIVER [3] Parent [1] [phone] About your hospitalization You were admitted on:  March 1, 2017 You last received care in the:  [facility] ADULT CHEM DEP You were discharged on:  March 4, 2017 Unit phone number:  [phone] Why you were hospitalized Your primary diagnosis was:  Not on File Your diagnoses also included:  Depression Providers Seen During Your Hospitalizations Provider Role Specialty Primary office phone Miki Best MD Attending Provider Psychiatry [phone] Your Primary Care Physician (PCP) Primary Care Physician Office Phone Office Fax [address], Rose 70 Follow-up Information Follow up With Details Comments Contact Info Margaret Kaur MD   [facility] [address] [id number] [address] [address] [phone] Your Appointments Monday March 06, 2017 11:15 AM EST Follow Up with Margaret Kaur MD  
[address] [address]) [facility] [address] [facility] 83 [address] [phone] Current Discharge Medication List  
  
START taking these medications Dose & Instructions Dispensing Information Comments Morning Noon Evening Bedtime  
 amphetamine-dextroamphetamine XR 30 mg XR capsule Commonly known as:  ADDERALL XR Your next dose is: Today, Tomorrow Other:  _________ Dose:  30 mg Take 1 Cap (30 mg total) by mouth every morningIndications: ATTENTION-DEFICIT HYPERACTIVITY DISORDER, depression adjunct. .  Max Daily Amount: 30 mg  
 Quantity:  30 Cap Refills:  0  
     
   
   
   
  
 buPROPion  mg SR tablet Commonly known as:  Raypashao Carol Your next dose is: Today, Tomorrow Other:  _________ Dose:  150 mg Take 1 Tab by mouth daily for 30 days. Indications: ANXIETY WITH DEPRESSION, ATTENTION-DEFICIT HYPERACTIVITY DISORDER, major depressive disorder, SMOKING CESSATION Quantity:  30 Tab Refills:  0  
     
   
   
   
  
 labetalol 100 mg tablet Commonly known as:  Annamary Hock Your next dose is: Today, Tomorrow Other:  _________ Dose:  100 mg Take 1 Tab by mouth daily for 30 days. Indications: hypertension Quantity:  30 Tab Refills:  0  
     
   
   
   
  
 nitrofurantoin 100 mg capsule Commonly known as:  MACRODANTIN Your next dose is: Today, Tomorrow Other:  _________ Dose:  100 mg Take 1 Cap by mouth two (2) times a day for 2 days. Indications: E. COLI URINARY TRACT INFECTION Quantity:  4 Cap Refills:  0 CONTINUE these medications which have CHANGED Dose & Instructions Dispensing Information Comments Morning Noon Evening Bedtime * levothyroxine 300 mcg tablet Commonly known as:  SYNTHROID What changed:  Another medication with the same name was added. Make sure you understand how and when to take each. Your next dose is: Today, Tomorrow Other:  _________ Dose:  300 mcg Take 1 Tab by mouth Daily (before breakfast). Quantity:  30 Tab Refills:  1  
     
   
   
   
  
 * levothyroxine 300 mcg tablet Commonly known as:  SYNTHROID What changed: You were already taking a medication with the same name, and this prescription was added. Make sure you understand how and when to take each. Your next dose is: Today, Tomorrow Other:  _________ Dose:  300 mcg Take 1 Tab by mouth Daily (before breakfast) for 30 days. Indications: hypothyroidism Quantity:  30 Tab Refills:  0  
     
   
   
   
  
 * Notice: This list has 2 medication(s) that are the same as other medications prescribed for you. Read the directions carefully, and ask your doctor or other care provider to review them with you. CONTINUE these medications which have NOT CHANGED Dose & Instructions Dispensing Information Comments Morning Noon Evening Bedtime  
 nitrofurantoin (macrocrystal-monohydrate) 100 mg capsule Commonly known as:  MACROBID Your next dose is: Today, Tomorrow Other:  _________ Dose:  100 mg Take 1 Cap by mouth two (2) times a day. Quantity:  10 Cap Refills:  0  
     
   
   
   
  
 PNV no.24-iron-folic acid-dha -025 mg-mcg-mg Cmpk Your next dose is: Today, Tomorrow Other:  _________ Take  by mouth. Refills:  0 Where to Get Your Medications Information on where to get these meds will be given to you by the nurse or doctor. ! Ask your nurse or doctor about these medications  
  amphetamine-dextroamphetamine XR 30 mg XR capsule buPROPion  mg SR tablet  
 labetalol 100 mg tablet  
 levothyroxine 300 mcg tablet  
 nitrofurantoin 100 mg capsule Discharge Instructions BEHAVIORAL HEALTH NURSING DISCHARGE NOTE The following personal items collected during your admission are returned to you:  
Dental Appliance: Dental Appliances: None Vision:   
Hearing Aid:   
Jewelry: Jewelry: None Clothing: Clothing: Andrew Griggs Other Valuables: Other Valuables: None Valuables sent to safe: Personal Items Sent to Safe: none PATIENT INSTRUCTIONS: 
 
Take Home Medications: 
{Medication reconciliation information is now added to the patient's AVS automatically when it is printed. There is no need to use this SmartLink in discharge instructions. Highlight this text and delete it to clear this message} What to do at Home: Pt will need to go [address][phone] . .. Message left on their intake line at 3:35pm today 3/3/17 to set up appointment . They were asked to call Adult Unit to set up pt appointment. [employer] Activation Thank you for requesting access to [employer]. Please follow the instructions below to securely access and download your online medical record. [employer] allows you to send messages to your doctor, view your test results, renew your prescriptions, schedule appointments, and more. How Do I Sign Up? 1. In your internet browser, go to [email or web address] 
2. Click on the First Time User? Click Here link in the Sign In box. You will be redirect to the New Member Sign Up page. 3. Enter your [employer] Access Code exactly as it appears below. You will not need to use this code after youve completed the sign-up process. If you do not sign up before the expiration date, you must request a new code. [employer] Access Code: Activation code not generated Current [facility] Status: Active (This is the date your [employer] access code will ) 4. Enter the last four digits of your Social Security Number (xxxx) and Date of Birth (mm/dd/yyyy) as indicated and click Submit. You will be taken to the next sign-up page. 5. Create a [employer] ID. This will be your [employer] login ID and cannot be changed, so think of one that is secure and easy to remember. 6. Create a [employer] password. You can change your password at any time. 7. Enter your Password Reset Question and Answer. This can be used at a later time if you forget your password. 8. Enter your e-mail address. You will receive e-mail notification when new information is available in [address] 9. Click Sign Up.  You can now view and download portions of your medical record. 10. Click the Download Summary menu link to download a portable copy of your medical information. Additional Information If you have questions, please visit the Frequently Asked Questions section of the [employer] website at [email or web address] Remember, MyChart is NOT to be used for urgent needs. For medical emergencies, dial 911. Patient {ARMBANDS:83370} Discharge Orders None [facility] Announcement We are excited to announce that we are making your provider's discharge notes available to you in [employer] [facility]. You will see these notes when they are completed and signed by the physician that discharged you from your recent hospital stay. If you have any questions or concerns about any information you see in [facility], please call the Health Information Department where you were seen or reach out to your Primary Care Provider for more information about your plan of care. Introducing [facility] & HEALTH SERVICES! Dear Soraida Rehman: Thank you for requesting a [facility] account. Our records indicate that you already have an active [facility] account. You can access your account anytime at [email or web address] Did you know that you can access your hospital and ER discharge instructions at any time in [facility]? You can also review all of your test results from your hospital stay or ER visit. Additional Information If you have questions, please visit the Frequently Asked Questions section of the [facility] website at [email or web address] Remember, [employer] is NOT to be used for urgent needs. For medical emergencies, dial 911. Now available from your iPhone and Android! General Information Please provide this summary of care documentation to your next provider. Patient Signature:  ____________________________________________________________ Date:  ____________________________________________________________  
  
Artem Elkins Provider Signature:  ____________________________________________________________ Date:  ____________________________________________________________

## 2017-03-01 NOTE — IP AVS SNAPSHOT
Current Discharge Medication List  
  
Take these medications at their scheduled times Dose & Instructions Dispensing Information Comments Morning Noon Evening Bedtime  
 amphetamine-dextroamphetamine XR 30 mg XR capsule Commonly known as:  ADDERALL XR Your next dose is: Today, Tomorrow Other:  ____________ Dose:  30 mg Take 1 Cap (30 mg total) by mouth every morningIndications: ATTENTION-DEFICIT HYPERACTIVITY DISORDER, depression adjunct. .  Max Daily Amount: 30 mg  
 Quantity:  30 Cap Refills:  0  
     
   
   
   
  
 buPROPion  mg SR tablet Commonly known as:  Manju Cera Your next dose is: Today, Tomorrow Other:  ____________ Dose:  150 mg Take 1 Tab by mouth daily for 30 days. Indications: ANXIETY WITH DEPRESSION, ATTENTION-DEFICIT HYPERACTIVITY DISORDER, major depressive disorder, SMOKING CESSATION Quantity:  30 Tab Refills:  0  
     
   
   
   
  
 labetalol 100 mg tablet Commonly known as:  Kanwal Runner Your next dose is: Today, Tomorrow Other:  ____________ Dose:  100 mg Take 1 Tab by mouth daily for 30 days. Indications: hypertension Quantity:  30 Tab Refills:  0  
     
   
   
   
  
 * levothyroxine 300 mcg tablet Commonly known as:  SYNTHROID Your next dose is: Today, Tomorrow Other:  ____________ Dose:  300 mcg Take 1 Tab by mouth Daily (before breakfast). Quantity:  30 Tab Refills:  1  
     
   
   
   
  
 * levothyroxine 300 mcg tablet Commonly known as:  SYNTHROID Your next dose is: Today, Tomorrow Other:  ____________ Dose:  300 mcg Take 1 Tab by mouth Daily (before breakfast) for 30 days. Indications: hypothyroidism Quantity:  30 Tab Refills:  0  
     
   
   
   
  
 nitrofurantoin (macrocrystal-monohydrate) 100 mg capsule Commonly known as:  MACROBID Your next dose is: Today, Tomorrow Other:  ____________ Dose:  100 mg Take 1 Cap by mouth two (2) times a day. Quantity:  10 Cap Refills:  0  
     
   
   
   
  
 nitrofurantoin 100 mg capsule Commonly known as:  MACRODANTIN Your next dose is: Today, Tomorrow Other:  ____________ Dose:  100 mg Take 1 Cap by mouth two (2) times a day for 2 days. Indications: E. COLI URINARY TRACT INFECTION Quantity:  4 Cap Refills:  0  
     
   
   
   
  
 * Notice: This list has 2 medication(s) that are the same as other medications prescribed for you. Read the directions carefully, and ask your doctor or other care provider to review them with you. Take these medications as directed Dose & Instructions Dispensing Information Comments Morning Noon Evening Bedtime PNV no.24-iron-folic acid-dha -491 mg-mcg-mg Cmpk Your next dose is: Today, Tomorrow Other:  ____________ Take  by mouth. Refills:  0 Where to Get Your Medications Information about where to get these medications is not yet available ! Ask your nurse or doctor about these medications  
  amphetamine-dextroamphetamine XR 30 mg XR capsule buPROPion  mg SR tablet  
 labetalol 100 mg tablet  
 levothyroxine 300 mcg tablet  
 nitrofurantoin 100 mg capsule

## 2017-03-01 NOTE — PROGRESS NOTES
Bedside and Verbal shift change report given to Cyril White RN (oncoming nurse) by Mary Jo Guzamn RN (offgoing nurse). Report included the following information SBAR, Kardex and MAR.

## 2017-03-01 NOTE — PROGRESS NOTES
Dr Ezequiel Lawrence has agreed to discharge. Medcial transport arranged through [facility] [place], reference number [id number]. Have requested 100 pm  time. Transport company to call and verify  time. Nursing to call report . EMTALA form completed. Fernie Osborne RN    Patient has been accepted t o [facility] , Dr Gabriel Stoner is accepting physician, nursing is to call report to [phone] and speak to Lili Light. I have paged Dr Ezequiel Lawrence and will ask if the patient is ready for discharge.  Fernie Osborne RN

## 2017-03-01 NOTE — PROGRESS NOTES
1915 Pt received after report given by Bernadine Ignacio. She also stated that the Pt stated to another person that she took some Tylenol with the other meds  2020 Called Dr Ryanne Castellano do to IV heplock unable to flush. IV  Not  to restart unless needed  2040 Family brought in Pts 4 month old son with permission from day staff   [phone] [id number] Pt resting in bed after hygiene completed

## 2017-03-01 NOTE — DISCHARGE SUMMARY
[facility] [place]    Patient: Kamala Peñaloza MRN: [id number]  CSN: [id number]    YOB: 1990  Age: 23 y.o. Sex: female    DOA: 2/26/2017 LOS:  LOS: 2 days   Discharge Date: 3/1/2017     Admission Diagnoses: Suicide attempt by beta blocker overdose [facility])    Discharge Diagnoses:    Problem List as of 3/1/2017  Date Reviewed: 6/15/2016          Codes Class Noted - Resolved    Su[facility] beta blocker [facility] ([facility] ICD-10-CM: S28.6X7X  ICD-9-CM: 971.3, E950.4  2/27/2017 - Present        Anxiety ICD-10-CM: F41.9  ICD-9-CM: 300.00  2/2/2016 - Present        Migraine ICD-10-CM: R30.766  ICD-9-CM: 346.90  7/2/2014 - Present        Dysmenorrhea ICD-10-CM: N94.6  ICD-9-CM: 625.3  4/26/2012 - Present        THYROID DYSFUNC-ANTEPART ICD-10-CM: E78.134, E07.9  ICD-9-CM: 648.13  11/30/2011 - Present    Overview Signed 11/30/2011  1:16 PM by Adal Morton MD     See ED Reports 07/2011             HABITUAL ABORTER ANTEPARTUM COMPLICATION NQB-[id number]: H77.56  ICD-9-CM: 646.33  11/30/2011 - Present        EPILEPSY COMPLIC ANTEPARTUM LJB-7-WY: 649.43  11/30/2011 - Present        RESOLVED: Abscess ICD-10-CM: L02.91  ICD-9-CM: 682.9  6/19/2013 - 11/4/2013        RESOLVED: Cellulitis ICD-10-CM: L03.90  ICD-9-CM: 682.9  6/19/2013 - 11/4/2013              Discharge Condition: Stable    Discharge To: Inpatient Psychiatric Unit     Consults: Hospitalist, Psychiatry and [facility] Course: The patient is a 42-year-old female who attempted suicide earlier in the evening of day she presented to ED secondary to depression. The patient took a handful of beta blocker in an attempt to end her life. Her mother was at the bedside, who stated that this happened once before, when the patient was 13, however it was not as severe. The patient is 3 months postpartum and, according to her mom, expressed about 1 month ago that she was feeling depressed.  She is not currently on any medication for depression. While in the ED, the patient presented with stable labs. However, at some point during her stay in the ER, her vitals became unstable. The patient's blood pressure dropped into the 70s/30s. A central line was placed, and the patient was placed on beta blocker antagonist. She was admitted to the ICU for further evaluation. Patient was weaned from Dopamine and Glucagon gtt's and transferred out of ICU to medical floor. Patient's blood pressure and heart rate remained stable for remainder of admission Tele-psychiatry recommended patient for inpatient psychiatric admission 2/2 to depression followed by SI attempt with high intent. Suicide precautions were initiated with 1:1 sitter and maintained throughout hospitalization. Patient was found to have UTI during admission and was started on Microbid and will need to complete 5 more days worth. Patient is stable for discharge to inpatient psychiatric unit. Significant Diagnostic Studies:   Recent Results (from the past 24 hour(s))   GLUCOSE, POC    Collection Time: 02/28/17  2:12 PM   Result Value Ref Range    Glucose (POC) 97 70 - 110 mg/dL   GLUCOSE, POC    Collection Time: 02/28/17  5:54 PM   Result Value Ref Range    Glucose (POC) 124 (H) 70 - 110 mg/dL   GLUCOSE, POC    Collection Time: 02/28/17 11:37 PM   Result Value Ref Range    Glucose (POC) 120 (H) 70 - 110 mg/dL   CBC WITH AUTOMATED DIFF    Collection Time: 03/01/17  4:25 AM   Result Value Ref Range    WBC 5.6 4.6 - 13.2 K/uL    RBC 3.71 (L) 4.20 - 5.30 M/uL    HGB 10.2 (L) 12.0 - 16.0 g/dL    HCT 32.5 (L) 35.0 - 45.0 %    MCV 87.6 74.0 - 97.0 FL    MCH 27.5 24.0 - 34.0 PG    MCHC 31.4 31.0 - 37.0 g/dL    RDW 16.9 (H) 11.6 - 14.5 %    PLATELET 706 544 - 521 K/uL    MPV 11.8 9.2 - 11.8 FL    NEUTROPHILS 50 40 - 73 %    LYMPHOCYTES 39 21 - 52 %    MONOCYTES 8 3 - 10 %    EOSINOPHILS 2 0 - 5 %    BASOPHILS 1 0 - 2 %    ABS. NEUTROPHILS 2.8 1.8 - 8.0 K/UL    ABS.  LYMPHOCYTES 2.2 0.9 - 3.6 K/UL ABS. MONOCYTES 0.5 0.05 - 1.2 K/UL    ABS. EOSINOPHILS 0.1 0.0 - 0.4 K/UL    ABS. BASOPHILS 0.0 0.0 - 0.06 K/UL    DF AUTOMATED     METABOLIC PANEL, BASIC    Collection Time: 03/01/17  4:25 AM   Result Value Ref Range    Sodium 144 136 - 145 mmol/L    Potassium 3.5 3.5 - 5.5 mmol/L    Chloride 112 (H) 100 - 108 mmol/L    CO2 21 21 - 32 mmol/L    Anion gap 11 3.0 - 18 mmol/L    Glucose 91 74 - 99 mg/dL    BUN 12 7.0 - 18 MG/DL    Creatinine 1.13 0.6 - 1.3 MG/DL    BUN/Creatinine ratio 11 (L) 12 - 20      GFR est AA >60 >60 ml/min/1.73m2    GFR est non-AA 59 (L) >60 ml/min/1.73m2    Calcium 8.1 (L) 8.5 - 10.1 MG/DL   GLUCOSE, POC    Collection Time: 03/01/17  5:49 AM   Result Value Ref Range    Glucose (POC) 104 70 - 110 mg/dL         Discharge Medications:     Current Discharge Medication List      START taking these medications    Details   nitrofurantoin, macrocrystal-monohydrate, (MACROBID) 100 mg capsule Take 1 Cap by mouth two (2) times a day. Qty: 10 Cap, Refills: 0         CONTINUE these medications which have NOT CHANGED    Details   levothyroxine (SYNTHROID) 300 mcg tablet Take 1 Tab by mouth Daily (before breakfast). Qty: 30 Tab, Refills: 1    Associated Diagnoses: Hypothyroidism due to acquired atrophy of thyroid      PNV no.24-iron-folic acid-dha -594 mg-mcg-mg cmpk Take  by mouth.          STOP taking these medications       labetalol (NORMODYNE) 100 mg tablet Comments:   Reason for Stopping:               Activity: Activity as tolerated    Diet: Regular Diet    Wound Care: None needed    Follow-up: Psychiatrist upon admission  PCP one week after discharge from inpatient psychiatric unit     Discharge time: 40 minutes   Elana Valdez NP  3/1/2017, 10:49 AM

## 2017-03-01 NOTE — PROGRESS NOTES
0710 Received bedside report from [place], [facility], updated white board, call bell is within reach. 0800 Patient is sleeping in the bed at this time. Sitter is at the bedside. Call bell is within reach. 3104 Patient is upset due to not having the breakfast she ordered. Placed a call to dietary and asked them to come and speak with her. 1000 During rounds an order to D/C her tele was given. [id number] removed at this time. Patient is resting in the bed with sitter at the bedside. 1115 Emtala form completed at this time. Will wait for transport to come before getting any other signatures on the form. Transport is set up for about 1300.    1300 Patient is resting in the bed at this time with call bell in reach. Sitter at the bedside. 1345 Report was given to [facility] at [facility]. 56 Transport is here to  the patient at this time. All spots on the emtala form has been signed and copies made.

## 2017-03-01 NOTE — PROGRESS NOTES
Patient had agreed to go to a psychiatric facility as recommended on psychiatric evaluation. Call and spoke to Mckenzie Bruce with [facility]. He will review the case and call with a determination.   Lindsey Romero RN

## 2017-03-01 NOTE — BH NOTES
Patient's chart reflects PE completed at [facility] on 2/28/17/ Repeat PE not warranted.

## 2017-03-01 NOTE — BH NOTES
Patient is alert and oriented x 4; stated that she's here because she overdosed on medications r/t depression and she is suffering from post-partum depression; denied thoughts of harm to self or others at this time; oriented to unit and room; verbalized understanding; shortly [facility], patient approached this writer and requested to be discharged; stated that she is not suicidal and this hospital is not for her; encouraged patient to stay for treatment or at least to speak with the MD in the am; however, patient refused and stated that she wants to go home to her baby. Chris Borrero RN (nursing supervisor) made aware and will contact the on-call MD to inform that patient would like to be discharged against medical advice; discussed AMA and TDO process with patient; verbalized understanding; will continue to monitor and provide a safe environment. 19:05 pm-19:35 pm  Nick QUINTANA) talking with patient; after speaking with [employer] worker, patient decided to remain in the hospital for treatment; patient also transferred to another room as requested d/t patient stated that her roommate tries to tell her what to do.

## 2017-03-01 NOTE — DISCHARGE INSTRUCTIONS
DISCHARGE SUMMARY from Nurse    The following personal items are in your possession at time of discharge:    Dental Appliances: None  Visual Aid: None     Home Medications: None  Jewelry: Bracelet (X 4)  Clothing: Pajamas, Footwear, Socks, Undergarments  Other Valuables: None             PATIENT INSTRUCTIONS:    After general anesthesia or intravenous sedation, for 24 hours or while taking prescription Narcotics:  · Limit your activities  · Do not drive and operate hazardous machinery  · Do not make important personal or business decisions  · Do  not drink alcoholic beverages  · If you have not urinated within 8 hours after discharge, please contact your surgeon on call. Report the following to your surgeon:  · Excessive pain, swelling, redness or odor of or around the surgical area  · Temperature over 100.5  · Nausea and vomiting lasting longer than 4 hours or if unable to take medications  · Any signs of decreased circulation or nerve impairment to extremity: change in color, persistent  numbness, tingling, coldness or increase pain  · Any questions        What to do at Home:  Recommended activity: Activity as tolerated and no driving for today. If you experience any of the following symptoms increase in depression symptoms, please follow up with . *  Please give a list of your current medications to your Primary Care Provider. *  Please update this list whenever your medications are discontinued, doses are      changed, or new medications (including over-the-counter products) are added. *  Please carry medication information at all times in case of emergency situations. These are general instructions for a healthy lifestyle:    No smoking/ No tobacco products/ Avoid exposure to second hand smoke    Surgeon General's Warning:  Quitting smoking now greatly reduces serious risk to your health.     Obesity, smoking, and sedentary lifestyle greatly increases your risk for illness    A healthy diet, regular physical exercise & weight monitoring are important for maintaining a healthy lifestyle    You may be retaining fluid if you have a history of heart failure or if you experience any of the following symptoms:  Weight gain of 3 pounds or more overnight or 5 pounds in a week, increased swelling in our hands or feet or shortness of breath while lying flat in bed. Please call your doctor as soon as you notice any of these symptoms; do not wait until your next office visit. Recognize signs and symptoms of STROKE:    F-face looks uneven    A-arms unable to move or move unevenly    S-speech slurred or non-existent    T-time-call 911 as soon as signs and symptoms begin-DO NOT go       Back to bed or wait to see if you get better-TIME IS BRAIN. Warning Signs of HEART ATTACK     Call 911 if you have these symptoms:   Chest discomfort. Most heart attacks involve discomfort in the center of the chest that lasts more than a few minutes, or that goes away and comes back. It can feel like uncomfortable pressure, squeezing, fullness, or pain.  Discomfort in other areas of the upper body. Symptoms can include pain or discomfort in one or both arms, the back, neck, jaw, or stomach.  Shortness of breath with or without chest discomfort.  Other signs may include breaking out in a cold sweat, nausea, or lightheadedness. Don't wait more than five minutes to call 911 - MINUTES MATTER! Fast action can save your life. Calling 911 is almost always the fastest way to get lifesaving treatment. Emergency Medical Services staff can begin treatment when they arrive -- up to an hour sooner than if someone gets to the hospital by car. The discharge information has been reviewed with the patient. The patient verbalized understanding. Discharge medications reviewed with the patient and appropriate educational materials and side effects teaching were provided.     Patient armband removed and shredded. MyChart Activation    Thank you for requesting access to InSample. Please follow the instructions below to securely access and download your online medical record. InSample allows you to send messages to your doctor, view your test results, renew your prescriptions, schedule appointments, and more. How Do I Sign Up? 1. In your internet browser, go to [email or web address]  2. Click on the First Time User? Click Here link in the Sign In box. You will be redirect to the New Member Sign Up page. 3. Enter your InSample Access Code exactly as it appears below. You will not need to use this code after youve completed the sign-up process. If you do not sign up before the expiration date, you must request a new code. InSample Access Code: Activation code not generated  Current InSample Status: Active (This is the date your InSample access code will )    4. Enter the last four digits of your Social Security Number (xxxx) and Date of Birth (mm/dd/yyyy) as indicated and click Submit. You will be taken to the next sign-up page. 5. Create a InSample ID. This will be your InSample login ID and cannot be changed, so think of one that is secure and easy to remember. 6. Create a InSample password. You can change your password at any time. 7. Enter your Password Reset Question and Answer. This can be used at a later time if you forget your password. 8. Enter your e-mail address. You will receive e-mail notification when new information is available in [address] 9. Click Sign Up. You can now view and download portions of your medical record. 10. Click the Download Summary menu link to download a portable copy of your medical information. Additional Information    If you have questions, please visit the Frequently Asked Questions section of the InSample website at [email or web address] Remember, InSample is NOT to be used for urgent needs.  For medical emergencies, dial 911.

## 2017-03-01 NOTE — IP AVS SNAPSHOT
Summary of Care Report The Summary of Care report has been created to help improve care coordination. Users with access to [employer] or [address] (Web-based application) may access additional patient information including the Discharge Summary. If you are not currently a [address] user and need more information, please call the number listed below in the [place] [address] section and ask to be connected with Medical Records. Facility Information Name Address Phone [address] [facility] [address] [phone] Patient Information Patient Name Sex  Opal Del Rosario ([id number]) Female 1990 Discharge Information Admitting Provider Service Area Unit Natasha Frausto MD / [address] [place] [address] 1 Adult Chem Dep / [phone] Discharge Provider Discharge Date/Time Discharge Disposition Destination (none) 3/4/2017 Morning (Pending) AHR (none) Patient Language Language ENGLISH [13] Problem List as of 3/4/2017  Date Reviewed: 6/15/2016 Codes Priority Class Noted - Resolved THYROID DYSFUNC-ANTEPART ICD-10-CM: O99.280, E07.9 ICD-9-CM: 648.13   2011 - Present Overview Signed 2011  1:16 PM by Leobardo Moon MD  
  See ED Reports 2011 HABITUAL ABORTER ANTEPARTUM COMPLICATION KGM-85-YY: S99.57 ICD-9-CM: 646.33   2011 - Present EPILEPSY COMPLIC ANTEPARTUM HVM-5-BU: 649.43   2011 - Present Dysmenorrhea ICD-10-CM: N94.6 ICD-9-CM: 625.3   2012 - Present RESOLVED: Abscess ICD-10-CM: L02.91 
ICD-9-CM: 682.9   2013 - 2013 RESOLVED: Cellulitis ICD-10-CM: L03.90 ICD-9-CM: 682.9   2013 - 2013 Migraine ICD-10-CM: Z43.886 ICD-9-CM: 346.90   2014 - Present Anxiety ICD-10-CM: F41.9 ICD-9-CM: 300.00   2016 - Present Suicide attempt by beta blocker overdose [facility]) ICD-10-CM: L27.6P7S 
ICD-9-CM: 971.3, E950.4   2/27/2017 - Present Depression ICD-10-CM: F32.9 ICD-9-CM: 224   3/1/2017 - Present You are allergic to the following No active allergies Current Discharge Medication List  
  
START taking these medications Dose & Instructions Dispensing Information Comments  
 amphetamine-dextroamphetamine XR 30 mg XR capsule Commonly known as:  ADDERALL XR Dose:  30 mg Take 1 Cap (30 mg total) by mouth every morningIndications: ATTENTION-DEFICIT HYPERACTIVITY DISORDER, depression adjunct. .  Max Daily Amount: 30 mg  
 Quantity:  30 Cap Refills:  0  
   
 buPROPion  mg SR tablet Commonly known as:  WELLBUTRIN SR  
 Dose:  150 mg Take 1 Tab by mouth daily for 30 days. Indications: ANXIETY WITH DEPRESSION, ATTENTION-DEFICIT HYPERACTIVITY DISORDER, major depressive disorder, SMOKING CESSATION Quantity:  30 Tab Refills:  0  
   
 labetalol 100 mg tablet Commonly known as:  Christy Colander Dose:  100 mg Take 1 Tab by mouth daily for 30 days. Indications: hypertension Quantity:  30 Tab Refills:  0  
   
 nitrofurantoin 100 mg capsule Commonly known as:  MACRODANTIN Dose:  100 mg Take 1 Cap by mouth two (2) times a day for 2 days. Indications: E. COLI URINARY TRACT INFECTION Quantity:  4 Cap Refills:  0 CONTINUE these medications which have CHANGED Dose & Instructions Dispensing Information Comments * levothyroxine 300 mcg tablet Commonly known as:  SYNTHROID What changed:  Another medication with the same name was added. Make sure you understand how and when to take each. Dose:  300 mcg Take 1 Tab by mouth Daily (before breakfast). Quantity:  30 Tab Refills:  1  
   
 * levothyroxine 300 mcg tablet Commonly known as:  SYNTHROID What changed:   You were already taking a medication with the same name, and this prescription was added. Make sure you understand how and when to take each. Dose:  300 mcg Take 1 Tab by mouth Daily (before breakfast) for 30 days. Indications: hypothyroidism Quantity:  30 Tab Refills:  0  
   
 * Notice: This list has 2 medication(s) that are the same as other medications prescribed for you. Read the directions carefully, and ask your doctor or other care provider to review them with you. CONTINUE these medications which have NOT CHANGED Dose & Instructions Dispensing Information Comments  
 nitrofurantoin (macrocrystal-monohydrate) 100 mg capsule Commonly known as:  MACROBID Dose:  100 mg Take 1 Cap by mouth two (2) times a day. Quantity:  10 Cap Refills:  0  
   
 PNV no.24-iron-folic acid-dha -649 mg-mcg-mg Cmpk Take  by mouth. Refills:  0 Current Immunizations Name Date Influenza Vaccine 10/8/2016 Influenza Vaccine (Quad) PF 1/15/2016 Follow-up Information Follow up With Details Comments Contact Info Balbina Markham MD   [address] [place] [id number] 
[phone] Discharge Instructions BEHAVIORAL HEALTH NURSING DISCHARGE NOTE The following personal items collected during your admission are returned to you:  
Dental Appliance: Dental Appliances: None Vision:   
Hearing Aid:   
Jewelry: Jewelry: None Clothing: Clothing: Melody Riches Other Valuables: Other Valuables: None Valuables sent to safe: Personal Items Sent to Safe: none PATIENT INSTRUCTIONS: 
 
Take Home Medications: 
{Medication reconciliation information is now added to the patient's AVS automatically when it is printed. There is no need to use this SmartLink in discharge instructions. Highlight this text and delete it to clear this message} What to do at [facility] Follow-up with *** in {0-10:22902} {units:11}.  If you have problems relating to your recovery, call your physician. The discharge information has been reviewed with the {PATIENT PARENT GUARDIAN:64648}. The {PATIENT PARENT GUARDIAN:65395} verbalized understanding. [employer] Activation Thank you for requesting access to Tapestry. Please follow the instructions below to securely access and download your online medical record. Tapestry allows you to send messages to your doctor, view your test results, renew your prescriptions, schedule appointments, and more. How Do I Sign Up? 1. In your internet browser, go to [email or web address] 
2. Click on the First Time User? Click Here link in the Sign In box. You will be redirect to the New Member Sign Up page. 3. Enter your Tapestry Access Code exactly as it appears below. You will not need to use this code after youve completed the sign-up process. If you do not sign up before the expiration date, you must request a new code. Tapestry Access Code: Activation code not generated Current Tapestry Status: Active (This is the date your Tapestry access code will ) 4. Enter the last four digits of your Social Security Number (xxxx) and Date of Birth (mm/dd/yyyy) as indicated and click Submit. You will be taken to the next sign-up page. 5. Create a Tapestry ID. This will be your Tapestry login ID and cannot be changed, so think of one that is secure and easy to remember. 6. Create a Tapestry password. You can change your password at any time. 7. Enter your Password Reset Question and Answer. This can be used at a later time if you forget your password. 8. Enter your e-mail address. You will receive e-mail notification when new information is available in [address] 9. Click Sign Up. You can now view and download portions of your medical record. 10. Click the Download Summary menu link to download a portable copy of your medical information. Additional Information If you have questions, please visit the Frequently Asked Questions section of the MyChart website at [email or web address] Remember, MyChart is NOT to be used for urgent needs. For medical emergencies, dial 911. Patient {ARMBANDS:20124} Chart Review Routing History Recipient Method Report Sent By Duglas Villasenor LPN Phone: [phone]  In [address], [place] [[id number]] 11/5/2013  9:06 AM

## 2017-03-02 PROCEDURE — 65220000003 HC RM SEMIPRIVATE PSYCH

## 2017-03-02 PROCEDURE — 74011250637 HC RX REV CODE- 250/637: Performed by: PSYCHIATRY & NEUROLOGY

## 2017-03-02 RX ORDER — DEXTROAMPHETAMINE SACCHARATE, AMPHETAMINE ASPARTATE MONOHYDRATE, DEXTROAMPHETAMINE SULFATE AND AMPHETAMINE SULFATE 2.5; 2.5; 2.5; 2.5 MG/1; MG/1; MG/1; MG/1
30 CAPSULE, EXTENDED RELEASE ORAL
Status: DISCONTINUED | OUTPATIENT
Start: 2017-03-03 | End: 2017-03-04 | Stop reason: HOSPADM

## 2017-03-02 RX ORDER — BUPROPION HYDROCHLORIDE 150 MG/1
150 TABLET, EXTENDED RELEASE ORAL DAILY
Status: DISCONTINUED | OUTPATIENT
Start: 2017-03-02 | End: 2017-03-04 | Stop reason: HOSPADM

## 2017-03-02 RX ADMIN — LEVOTHYROXINE SODIUM 300 MCG: 150 TABLET ORAL at 06:43

## 2017-03-02 RX ADMIN — TRAZODONE HYDROCHLORIDE 50 MG: 50 TABLET ORAL at 20:40

## 2017-03-02 RX ADMIN — NITROFURANTOIN MACROCRYSTALS 100 MG: 50 CAPSULE ORAL at 20:35

## 2017-03-02 RX ADMIN — BUPROPION HYDROCHLORIDE 150 MG: 150 TABLET, FILM COATED, EXTENDED RELEASE ORAL at 11:55

## 2017-03-02 RX ADMIN — NITROFURANTOIN MACROCRYSTALS 100 MG: 50 CAPSULE ORAL at 08:17

## 2017-03-02 NOTE — H&P
BEHAVIORAL HEALTH ADMISSION NOTE    Patient: Arun De Luna               Sex: female          DOA: 3/1/2017       YOB: 1990      Age:  23 y.o.        LOS:  LOS: 1 day     HISTORY OF PRESENT ILLNESS:       CC: SI post SA via OD on medications. HPI: Patient is a 23years old , employed/unemployed, domicile (lives w/ family) WF w/ a PPhx of Depression, ADHD,  and 3 months postpartum. Patient was brought into the [facility] by EMS called by self after intentionally OD on her medical medications in ordered to kill herself. Per record patient reported one prior SA at age 13 via OD to harm herself. On this admission patient was admitted under volunteer status for post SA and depression. Per notes patient took 40-50x 200 mg Motrin and 52x 100 mg Labetolol with the intention of ending her life. Per notes after patient took the pills she called 911 for help. Patient was brought to [facility] ED and was admitted to the ICU for medical stabilization. Once she was medically stable she was transferred to [facility] for psychiatric stabilization. Patient reported that she has been increasingly depressed, hopeless, and helpless since the birth of her son. She stated, I couldnt take it anymore. She reported that she had tried SSRIs in the past with any success. Currently the patient is not on any ADM for depression. UDS was + for Amphetamine and Opiate. Patient reported that she has been using unprescribed Adderall and Percocet to improve her mood and energy. She reports being depressed for quite sometimes and having depressive symptoms (SIG E CAPS). She reports that adderal l improved her depression. She also reports having ADHD symptoms (being able to stay focus and easily distracted). She reports that she did not take stimulant to get high or addicted to it. She denied A/V/H  AND no longer has S/I and no H/I. She denies wanting to harm her child. I love her child very much.  She reports having anxiety but she doesnt exhibit opiate w/d symptoms. She is willing to complied w/ the recommended treatment plan in ordered to improve her psychiatric symptoms. Active Problems:    Depression (3/1/2017)         Past Medical History:   Diagnosis Date    Depression 3/1/2017    Endocrine disease Hypothyroidism    Hypertension     Seizures ([address]     epilepsy    Thyroid disease         Past Surgical History:   Procedure Laterality Date    HX TONSILLECTOMY      Age 7       Social History   Substance Use Topics    Smoking status: Former Smoker     Packs/day: 0.50     Years: 5.00    Smokeless tobacco: Never Used    Alcohol use 0.0 oz/week     0 Standard drinks or equivalent per week      Comment: sometimes        Family History   Problem Relation Age of Onset    Diabetes Other      great grandmother    Hypertension Other      family history    Thyroid Disease Other      great grandmother        No Known Allergies     Prior to Admission medications    Medication Sig Start Date End Date Taking? Authorizing Provider   nitrofurantoin, macrocrystal-monohydrate, (MACROBID) 100 mg capsule Take 1 Cap by mouth two (2) times a day. 3/1/17   Anand Llanos NP   PNV no.66-iubq-zphhj acid-dha -043 mg-mcg-mg cmpk Take  by mouth. Myles Vanegas MD   levothyroxine (SYNTHROID) 300 mcg tablet Take 1 Tab by mouth Daily (before breakfast).  6/23/16   João Blakely MD       VITALS:    Visit Vitals    BP (!) 161/100 (BP 1 Location: Right arm, BP Patient Position: Sitting)    Pulse 65    Temp 97.1 °F (36.2 °C)    Resp 17    Ht 5' 2\" (1.575 m)    Wt 111.1 kg (245 lb)    LMP 02/20/2017    BMI 44.81 kg/m2       Labs: Reviewed and in chart  PSYCHIATRIC HISTORY:  DIAGNOSIS:  Depression and ADHD  CURRENT PSYCHIATRIST: HELENA  THERAPIST: TBD  ADMISSIONS: none reported  SUICIDE ATTEMPTS:  one prior SA at age 13 via OD to harm herself        REVIEW OF SYSTEMS:     GENERAL:Patient alert, awake and oriented times 3, able to communicate full sentences and not in distress. HEENT: No change in vision, no earache, tinnitus, sore throat or sinus congestion. NECK: No pain or stiffness. PULMONARY: No shortness of breath, cough or wheeze. GASTROINTESTINAL: No abdominal pain, nausea, vomiting or diarrhea, melena or bright red blood per rectum. GENITOURINARY: No urinary frequency, urgency, hesitancy or dysuria. MUSCULOSKELETAL: No joint or muscle pain, no back pain, no recent trauma. DERMATOLOGIC: No rash, no itching, no lesions. ENDOCRINE: No polyuria, polydipsia, no heat or cold intolerance. No recent change in weight. HEMATOLOGICAL: No anemia or easy bruising or bleeding. \NEUROLOGIC: No headache, seizures, numbness, tingling or weakness. \denies f/c, pain, n/v, d/c, SOB, CP, weakness/numbness, difficulty urinating    MINI MENTAL STATUS EXAM: :   Orientation- Oriented in all spheres  Short-term memory: shows no evidence of impairment  Attention:Normal  Repeat phrase \"no ifs, ands, or buts. \"  Follow three stage command- follow written command (CLOSE YOUR EYES)\- write a spontaneous sentence  Copy a simple design      MENTAL STATUS EXAM:  Appearance:shows no evidence of impairment  Behavior: shows no evidence of impairment  Motor: within normal limits  Speech: is slowed and is soft  Mood: depressed  Affect: depressed  Thought Process: shows no evidence of impairment  Thought Content: no evidence of impairment  Perception:None elicited  Cognition:  appropriate decision making  Insight: The patient's insight is blaming  Judgment: is psychologically impaired    RISK ASSESSMENT:   Prior Attempts: YES  Lethality of Attempts: YES  Weapons at [address] at Home: NO  Alcohol/Drug Use: NO  Protective Factors:children, contacts reliable for safety, denies intent, no organized plan and no means/access to weapons      ASSESSMENT: Patient is a 23years old , employed/unemployed, domicile (lives w/ family) WF w/ a PPhx of Depression, ADHD,  and 3 months postpartum. Patient was brought into the [facility] by EMS called by self after intentionally OD on her medical medications in ordered to kill herself. Per record patient reported one prior SA at age 13 via OD to harm herself. On this admission patient was admitted under volunteer status for post SA and depression. Per notes patient took 40-50x 200 mg Motrin and 52x 100 mg Labetolol with the intention of ending her life. Per notes after patient took the pills she called 911 for help. Patient was brought to [facility] ED and was admitted to the ICU for medical stabilization. Once she was medically stable she was transferred to [facility] for psychiatric stabilization. Patient reported that she has been increasingly depressed, hopeless, and helpless since the birth of her son. She stated, I couldnt take it anymore. She reported that she had tried SSRIs in the past with any success. Currently the patient is not on any ADM for depression. UDS was + for Amphetamine and Opiate. She denies wanting to harm her child. I love her child very much. She reports having anxiety but she doesnt exhibit opiate w/d symptoms. She is willing to complied w/ the recommended treatment plan in ordered to improve her psychiatric symptoms. Axis I: Major Depression Disorder, R/S w/o psychotic feature, Anxiety due to depression, ADHD Inattentiveness, R/O Postpartum Depression,   Axis II: Deferred  Axis III: None reported  Axis IV: None reported  Axis V: GAF: 30     Plan:  1. Continue with inpatient psychiatric treatment  2. Continue with suicide or assault precautions  3. Patient is to continue with Art/OT and family therapy sessions  4. Will need to talk with outpatient psychiatrist/therapist for more collateral  5. Psychotropic medications:  1. Start: Wellbutrin  mg PO Qam then considers inc dosage after 3 days (depression, adhd, smoking cessation)  2. Start:  Adderall XR 30 mg PO Qam BEFORE BK    6. Labs: None necessary at this time.    7. SW to help with disposition    EST LOS: 3-5 DAYS    Disposition:  Home w/Family           ___________________________________________________    Attending Physician: Yobani Vargas MD       ALLERGIES: No Known Allergies    SUBSTANCE USE:none    Patient Vitals for the past 24 hrs:   Temp Pulse Resp BP   03/02/17 0859 97.1 °F (36.2 °C) 65 17 (!) 161/100   03/01/17 1559 98.2 °F (36.8 °C) 74 18 (!) 148/95

## 2017-03-02 NOTE — BSMART NOTE
OCCUPATIONAL THERAPY PROGRESS NOTE  Group Time:  2293  Attendance: The patient attended full group. Participation:  The patient participated with minimal elaboration in the activity. Attention:  The patient was able to focus on the activity. Interaction:  The patient acknowledges others or responds to questions,  with no spontaneous interaction. Participated as called on in discussion on stress management.

## 2017-03-02 NOTE — BSMART NOTE
JANETH NOTE: SW attempted to meet with patient to discuss admission. Patient was in her room in bed. Upon approach patient stated, \"I am tired I don't want to be bothered\". JANETH introduced self to patient however patient requested to have \"this meeting tomorrow\". JANETH informed patient it will be noted that she declined to meet today.

## 2017-03-02 NOTE — DISCHARGE INSTRUCTIONS
BEHAVIORAL HEALTH NURSING DISCHARGE NOTE      The following personal items collected during your admission are returned to you:   Dental Appliance: Dental Appliances: None  Vision:    Hearing Aid:    Jewelry: Jewelry: None  Clothing: Clothing: Pajamas, Shirt  Other Valuables: Other Valuables: None  Valuables sent to safe: Personal Items Sent to Safe: none      PATIENT INSTRUCTIONS:    Take Home Medications:  {Medication reconciliation information is now added to the patient's AVS automatically when it is printed. There is no need to use this SmartLink in discharge instructions. Highlight this text and delete it to clear this message}      What to do at Home: Pt will need to go to intake at [address] [place] [address] [phone] . .. Message left on their intake line at 3:35pm today 3/3/17 to set up appointment . They were asked to call Adult Unit to set up pt appointment. [employer] Activation    Thank you for requesting access to [employer]. Please follow the instructions below to securely access and download your online medical record. [employer] allows you to send messages to your doctor, view your test results, renew your prescriptions, schedule appointments, and more. How Do I Sign Up? 1. In your internet browser, go to [email or web address]  2. Click on the First Time User? Click Here link in the Sign In box. You will be redirect to the New Member Sign Up page. 3. Enter your [employer] Access Code exactly as it appears below. You will not need to use this code after youve completed the sign-up process. If you do not sign up before the expiration date, you must request a new code. [employer] Access Code: Activation code not generated  Current [employer] Status: Active (This is the date your [employer] access code will )    4. Enter the last four digits of your Social Security Number (xxxx) and Date of Birth (mm/dd/yyyy) as indicated and click Submit. You will be taken to the next sign-up page. 5. Create a [employer] ID. This will be your Personalis login ID and cannot be changed, so think of one that is secure and easy to remember. 6. Create a Personalis password. You can change your password at any time. 7. Enter your Password Reset Question and Answer. This can be used at a later time if you forget your password. 8. Enter your e-mail address. You will receive e-mail notification when new information is available in [address] 9. Click Sign Up. You can now view and download portions of your medical record. 10. Click the Download Summary menu link to download a portable copy of your medical information. Additional Information    If you have questions, please visit the Frequently Asked Questions section of the Personalis website at [email or web address] Remember, Personalis is NOT to be used for urgent needs. For medical emergencies, dial 911.       Patient {ARMBANDS:20124}

## 2017-03-02 NOTE — PROGRESS NOTES
Problem: Depressed Mood (Adult/Pediatric)  Goal: *STG: Attends activities and groups  Outcome: Progressing Towards Goal  Pt attended and participated in scheduled groups  Goal: *STG: Remains safe in hospital  Outcome: Progressing Towards Goal  Pt has not engaged in any self injurious behaviors    Comments:   Pt isolating to self. Pt denies SI. Pt does verbalize feelings of depression and stated that she was feeling depressed prior to pregnancy. Pt stated that her son was born premature at 29 weeks and was NICU for three weeks. Pt stated that she suffered 6 miscarriages prior to successful pregnancy due to hypothyroidism. Pt stated she feels the accumulation of miscarriages contributed to her depression. Pt stated she does have a support system at home; however, it is difficult to have \"alone time\" with baby. Pt has not attended groups. Pt compliant with start of Wellbutrin. Pt compliant with meals. Pt has remained free of falls and was encouraged to continue to utilize non skid foot wear.

## 2017-03-03 PROCEDURE — 65220000003 HC RM SEMIPRIVATE PSYCH

## 2017-03-03 PROCEDURE — 74011250637 HC RX REV CODE- 250/637: Performed by: PSYCHIATRY & NEUROLOGY

## 2017-03-03 RX ORDER — LABETALOL 100 MG/1
100 TABLET, FILM COATED ORAL DAILY
Status: DISCONTINUED | OUTPATIENT
Start: 2017-03-03 | End: 2017-03-04 | Stop reason: HOSPADM

## 2017-03-03 RX ADMIN — DEXTROAMPHETAMINE SACCHARATE, AMPHETAMINE ASPARTATE, DEXTROAMPHETAMINE SULFATE AND AMPHETAMINE SULFATE 30 MG: 2.5; 2.5; 2.5; 2.5 CAPSULE, EXTENDED RELEASE ORAL at 06:46

## 2017-03-03 RX ADMIN — NITROFURANTOIN MACROCRYSTALS 100 MG: 50 CAPSULE ORAL at 08:26

## 2017-03-03 RX ADMIN — LABETALOL HYDROCHLORIDE 100 MG: 100 TABLET, FILM COATED ORAL at 19:04

## 2017-03-03 RX ADMIN — NITROFURANTOIN MACROCRYSTALS 100 MG: 50 CAPSULE ORAL at 20:22

## 2017-03-03 RX ADMIN — BUPROPION HYDROCHLORIDE 150 MG: 150 TABLET, FILM COATED, EXTENDED RELEASE ORAL at 08:26

## 2017-03-03 RX ADMIN — LEVOTHYROXINE SODIUM 300 MCG: 150 TABLET ORAL at 06:46

## 2017-03-03 RX ADMIN — TRAZODONE HYDROCHLORIDE 50 MG: 50 TABLET ORAL at 20:22

## 2017-03-03 NOTE — BH NOTES
Juan Manuel Tran is not participating in Substance abuse. Group time: 1 hour    Personal goal for participation: Educate on Substance abuse    Goal orientation: passive    Group therapy participation: refuse    Therapeutic interventions reviewed and discussed:  Staff encouraged Pt. To participate in group    Impression of participation: Pt. Refuse chose to  Sat at the table in the day area and read a book.

## 2017-03-03 NOTE — BSMART NOTE
SW Contact:  Pt still with flat affect & dysphoric mood but claims helping some. Given some basic GOAL & CBT handouts. Family supportive. Discussed role of outpt tx. Message left for [facility] # [phone] to call back to Adult Unit to set up pt appointment.

## 2017-03-03 NOTE — PROGRESS NOTES
PCCM  Fri  3/3/2017  Pt appears on Pulmonary List.   I do not see any active issues mentioned in recent EMR notes. Enid Flowers had a normal PCXR for IJ placement and was observed in ICU for 2d. PCCM will sign off. Please call for any questions/ consult/ concerns.    Dr Jolie Abraham on call over the upcoming weekend.  -[facility]  [phone]

## 2017-03-03 NOTE — BH NOTES
GROUP THERAPY PROGRESS NOTE    Gui Almanzar is participating in [place]. Group time: 30 minutes  Pt attended group with discussion of any issues on unit and the procedure for those being discharged. She sat quietly as others spoke.

## 2017-03-03 NOTE — BSMART NOTE
OCCUPATIONAL THERAPY PROGRESS NOTE  Group Time:  9721  Attendance: The patient attended full group. Participation:  The patient participated with moderate elaboration in the activity. Attention:  The patient was able to focus on the activity. Interaction:  The patient occasionally  interacts with others. Patient was in the day area as the group formed, she remained willingly and participated in answering questions. Patient appeared sad and almost in tears at the beginning of group. Another patient spontaneously got her some tissues, although she didn't use them. Patient said she was missing her 2 month old child. Patient was able to contribute some suggestions to techniques for helping herself fall asleep when stressed about something.

## 2017-03-03 NOTE — PROGRESS NOTES
Behavioral Health Progress Note      3/3/2017    Filiberto Farmer    Current Diagnosis: Major Depression Disorder, R/S w/o psychotic feature, Anxiety due to depression, ADHD Inattentiveness by history    Report from the nursing staff changes in the medical condition while patient has been on the unit:    Patient Vitals for the past 24 hrs:   Temp Pulse Resp BP   03/03/17 0759 96.6 °F (35.9 °C) 68 18 (!) 146/93       No results found for this or any previous visit (from the past 24 hour(s)). Sleep:shows no evidence of impairment    Interval intake: Patient reports feeling less anxious and more restful. She has been isolative in her room not interacting w/ peers or staff. Patient hasn't posed any management problem. Her emotional and behavior are in controlled. She has been compliant w/ meds and tolerating them well w/o any SE or reported. She denies A/V/H and H/S/I/P. NIO neurovegetative si/x observed or reported. Mental Status Exam: Affect/Mood are less anxious/depressed. Insight/judgment are improving. Treatment Plan:  Psychotropic medications:  1. Continue: Wellbutrin  mg PO Qam then considers inc dosage after 3 days (depression, adhd, smoking cessation)  2.  Continue: Adderall XR 30 mg PO Qam BEFORE BK    Anticipated Discharge: HELENA Gauthier MD  3/3/2017

## 2017-03-03 NOTE — CONSULTS
Opened this chart as I am on call from [facility].   This patient found in the PCCM List.

## 2017-03-03 NOTE — BH NOTES
Dhara Lynch is  participating in [place]. Group time: 15 minutes    Personal goal for participation: Community announcement    Goal orientation: community    Group therapy participation: fully participated    Therapeutic interventions reviewed and discussed: Staff encouraged Pt. To report any maintenance/housekeeping or community concerns to staff so it can be addressed. Impression of participation: Pt. Did not have any maintenance/housekeeping or community concerns to report to staff .

## 2017-03-03 NOTE — BH NOTES
\"I don't do groups anybody's group I just don't do them\". Pt. Isolated mostly this shift in room. Pt. did sat in the day area shortly reading a book, pt. did not socialize at that time with peers. Pt. denies SI/HI. AV/H. Pt contracts for safety on the unit. Pt.denies any new medical/pain issues. Pt. completed ADL's. Pt. ate 100% of meals and has been compliant with medications. Pt. Refuse recreation and AA group, despite staff encouragement. Pt. Received a visit from her mother, visit went well. Staff encouraged Pt. to  participate in treatment and  medication therapy. Pt agreed. Pt. remain free of falls and provided non skid socks. Staff will continue to monitor Pt. for behavior safety and location.

## 2017-03-03 NOTE — PROGRESS NOTES
Problem: Depressed Mood (Adult/Pediatric)  Goal: *STG: Complies with medication therapy  Patient will take prescribed medications as ordered every day throughout hospital stay. Outcome: Progressing Towards Goal  Patient has been compliant with prescribed medication. Problem: Suicide/Homicide (Adult/Pediatric)  Goal: *STG/LTG: No longer expresses self destructive or suicidal/homicidal thoughts  Will no longer have ideations daily during her hospital stay. Outcome: Progressing Towards Goal  Patient has verbalized denial of suicidal ideation. Comments:   Patient has been compliant with prescribed medication and unit activities. Patient has eaten 100% of meal and reported sleeping well throughout the night. Patient reported feeling better stating she is sorry for what happened \"I'm so sorry I just felt so overwhelmed. It seems like I'm the only one that does everything he doesn't help\". Patient has been visible within milieu and has been observed sitting quietly and is cooperative upon approach. Patient has denies suicidal ideation with no safety issues noted. Denies auditory/visual hallucinations. Will continue to monitor or safety with support as needed throughout treatment regimen.

## 2017-03-04 VITALS
BODY MASS INDEX: 45.08 KG/M2 | HEIGHT: 62 IN | DIASTOLIC BLOOD PRESSURE: 94 MMHG | TEMPERATURE: 96.6 F | SYSTOLIC BLOOD PRESSURE: 134 MMHG | RESPIRATION RATE: 16 BRPM | WEIGHT: 245 LBS | HEART RATE: 77 BPM

## 2017-03-04 PROCEDURE — 74011250637 HC RX REV CODE- 250/637: Performed by: PSYCHIATRY & NEUROLOGY

## 2017-03-04 RX ORDER — BUPROPION HYDROCHLORIDE 150 MG/1
150 TABLET, EXTENDED RELEASE ORAL DAILY
Qty: 30 TAB | Refills: 0 | Status: SHIPPED | OUTPATIENT
Start: 2017-03-04 | End: 2017-03-27 | Stop reason: DRUGHIGH

## 2017-03-04 RX ORDER — LEVOTHYROXINE SODIUM 300 UG/1
300 TABLET ORAL
Qty: 30 TAB | Refills: 0 | Status: SHIPPED | OUTPATIENT
Start: 2017-03-04 | End: 2017-04-03

## 2017-03-04 RX ORDER — DEXTROAMPHETAMINE SACCHARATE, AMPHETAMINE ASPARTATE MONOHYDRATE, DEXTROAMPHETAMINE SULFATE AND AMPHETAMINE SULFATE 7.5; 7.5; 7.5; 7.5 MG/1; MG/1; MG/1; MG/1
30 CAPSULE, EXTENDED RELEASE ORAL
Qty: 30 CAP | Refills: 0 | Status: SHIPPED | OUTPATIENT
Start: 2017-03-04 | End: 2017-03-27 | Stop reason: SDUPTHER

## 2017-03-04 RX ORDER — NITROFURANTOIN (MACROCRYSTALS) 100 MG/1
100 CAPSULE ORAL 2 TIMES DAILY
Qty: 4 CAP | Refills: 0 | Status: SHIPPED | OUTPATIENT
Start: 2017-03-04 | End: 2017-03-06

## 2017-03-04 RX ORDER — LABETALOL 100 MG/1
100 TABLET, FILM COATED ORAL DAILY
Qty: 30 TAB | Refills: 0 | Status: SHIPPED | OUTPATIENT
Start: 2017-03-04 | End: 2017-04-03

## 2017-03-04 RX ADMIN — NITROFURANTOIN MACROCRYSTALS 100 MG: 50 CAPSULE ORAL at 09:11

## 2017-03-04 RX ADMIN — DEXTROAMPHETAMINE SACCHARATE, AMPHETAMINE ASPARTATE, DEXTROAMPHETAMINE SULFATE AND AMPHETAMINE SULFATE 30 MG: 2.5; 2.5; 2.5; 2.5 CAPSULE, EXTENDED RELEASE ORAL at 06:50

## 2017-03-04 RX ADMIN — BUPROPION HYDROCHLORIDE 150 MG: 150 TABLET, FILM COATED, EXTENDED RELEASE ORAL at 09:11

## 2017-03-04 RX ADMIN — LABETALOL HYDROCHLORIDE 100 MG: 100 TABLET, FILM COATED ORAL at 09:11

## 2017-03-04 RX ADMIN — LEVOTHYROXINE SODIUM 300 MCG: 150 TABLET ORAL at 06:50

## 2017-03-04 NOTE — BH NOTES
Patient interacted well with peers. Patient was receptive to unit rules. Patient attended group. Patient had visitors and interacted well with visitor. Patient had dinner and snacks.

## 2017-03-04 NOTE — PROGRESS NOTES
Behavioral Health Progress Note    Patient mother and  came in to request if the patient can be discharged. They voices that the patient will be with them and they will take full responsibility for the patient safety. A safety plan was placed in place for the patient and her family. 3/4/2017    Andres Fan    Current Diagnosis: Major Depression Disorder, R/S w/o psychotic feature, Anxiety due to depression, ADHD Inattentiveness by history    Report from the nursing staff changes in the medical condition while patient has been on the unit:    Patient Vitals for the past 24 hrs:   Temp Pulse Resp BP   03/04/17 0814 96.6 °F (35.9 °C) 77 16 (!) 134/94       No results found for this or any previous visit (from the past 24 hour(s)). Sleep:shows no evidence of impairment    Interval history: Patient reports feeling better because she has time to refect on her action. She reports that she was just overwhelmed. She has been interacting w/ peers or staff. Patient hasn't posed any management problem. Her emotional and behavior are in controlled. She has been compliant w/ meds and tolerating them well w/o any SE or reported. She denies A/V/H and H/S/I/P. NO neurovegetative si/x observed or reported    Mental Status Exam: Affect/Mood are brighter. Insight/judgment are improving. Treatment Plan:  Psychotropic medications:  1. Continue: Wellbutrin  mg PO Qam then considers inc dosage after 3 days (depression, adhd, smoking cessation)  2.  Continue: Adderall XR 30 mg PO Qam BEFORE BK     Anticipated Discharge: HELENA Morley MD  3/4/2017

## 2017-03-04 NOTE — BH NOTES
GROUP THERAPY PROGRESS NOTE    Amando Renteria is participating in [place].      Group time: 30 minutes    Personal goal for participation: Participated in group     Goal orientation: community    Group therapy participation: active    Therapeutic interventions reviewed and discussed: unit rules/unit announcements    Impression of participation: Patient was engaged in group

## 2017-03-04 NOTE — DISCHARGE SUMMARY
[facility]  Discharge Summary     Patient ID:  Jaquelin Fontenot  [id number]  89 y.o.  1990    Admit date: 3/1/2017    Discharge date and time: 3/4/2017 and morning. Admission Diagnoses: Depression  Depression    Discharge Diagnoses: Major Depression Disorder, R/S w/o psychotic feature, Anxiety due to depression, ADHD Inattentiveness by history    Disposition: home    Discharged Condition: good    Past Medical History:   Diagnosis Date    Depression 3/1/2017    Endocrine disease Hypothyroidism    Hypertension     Seizures ([address]     epilepsy    Thyroid disease       Family History   Problem Relation Age of Onset    Diabetes Other      great grandmother    Hypertension Other      family history    Thyroid Disease Other      great grandmother      Social History   Substance Use Topics    Smoking status: Former Smoker     Packs/day: 0.50     Years: 5.00    Smokeless tobacco: Never Used    Alcohol use 0.0 oz/week     0 Standard drinks or equivalent per week      Comment: sometimes     Past Surgical History:   Procedure Laterality Date    HX TONSILLECTOMY      Age 7      Prior to Admission medications    Medication Sig Start Date End Date Taking? Authorizing Provider   nitrofurantoin, macrocrystal-monohydrate, (MACROBID) 100 mg capsule Take 1 Cap by mouth two (2) times a day. 3/1/17   Cornell North NP   PNV no.[id number] acid-dha -303 mg-mcg-mg cmpk Take  by mouth. Myles Vanegas MD   levothyroxine (SYNTHROID) 300 mcg tablet Take 1 Tab by mouth Daily (before breakfast). 6/23/16   Mikayla Church MD     No Known Allergies     Hospital course: The patient was admitted to the special treatment unit on 3/1/2017. The patient was engaged in individual and group therapies, and occupational therapy. The patient was involved in chemical dependence educational classes and NA/AA. During hospitalization patient posed NO management problems.  Patient was compliant w/ meds and w/ meals w/o any SE reported or observed. Patient also attended psychotherapy group sessions. Patient reports that the current treatment regimen is effective at controlling his psychiatric symptoms. At the time of discharge, the patient denied homicidal or suicidal ideation. Patient was not psychotic and was capable of self-care and competent to make their own financial and medical decisions. The patient has an understanding of treatment recommendations and medication management on discharge. Discharge Exams:   Mental Status exam: WNL   Physical exam: No exam performed today, NO physical complaints reported. Chest X-Ray: None  ECG: None    Lab/Data Review: All lab results for the last 24 hours reviewed. Consultations (including impressions and outcomes): None necessary  Psychiatric Testing: Reality based  Treatment and Response: Effective  Significant adverse reaction to drugs: none  Procedures/Operations: none  Aftercare safety treatment plan was discussed with the patient before discharge. Discharge medications:  1. Wellbutrin  mg PO daily  2. Adderall XR 30 mg PO qdaily after BK  3. Labetalol 100 mg PO daily  4. Synthroid 300 mcg PO daily before BK  5 Macrodantin 100 mg PO BID for 4 more days. Activity: Activity as tolerated  Diet: Regular Diet    All f/u were arranged for the patient by the discharge planner at the time of discharge.       Signed:  Dariana Hickman MD  3/4/2017  1:50 PM

## 2017-03-04 NOTE — BH NOTES
Patient's mother came in to visit. Patient verbalized to her Mother that she is no longer suicidal and does not need in patient hospitalization. Mother requested to see the Dr. Dr. Nathan Freshwater in to visit with patient and Mother. Patient discharged home. Denies suicidal and homicidal ideations. All belongings returned. [facility] and [facility]. Prescriptions given with verbal and written instructions.

## 2017-03-06 ENCOUNTER — OFFICE VISIT (OUTPATIENT)
Dept: FAMILY MEDICINE CLINIC | Age: 27
End: 2017-03-06

## 2017-03-06 VITALS
HEIGHT: 62 IN | RESPIRATION RATE: 18 BRPM | SYSTOLIC BLOOD PRESSURE: 147 MMHG | BODY MASS INDEX: 47.66 KG/M2 | OXYGEN SATURATION: 98 % | TEMPERATURE: 98.6 F | HEART RATE: 89 BPM | WEIGHT: 259 LBS | DIASTOLIC BLOOD PRESSURE: 92 MMHG

## 2017-03-06 DIAGNOSIS — F33.41 RECURRENT MAJOR DEPRESSION IN PARTIAL REMISSION (HCC): Primary | ICD-10-CM

## 2017-03-06 DIAGNOSIS — E03.4 HYPOTHYROIDISM DUE TO ACQUIRED ATROPHY OF THYROID: ICD-10-CM

## 2017-03-06 DIAGNOSIS — E66.01 MORBID OBESITY WITH BMI OF 45.0-49.9, ADULT (HCC): ICD-10-CM

## 2017-03-06 NOTE — PROGRESS NOTES
1. Have you been to the ER, urgent care clinic since your last visit? Hospitalized since your last visit? No    2. Have you seen or consulted any other health care providers outside of the [address] [place] [address] since your last visit? Include any pap smears or colon screening.  No

## 2017-03-06 NOTE — PATIENT INSTRUCTIONS
Recovering From Depression: Care Instructions  Your Care Instructions  Taking good care of yourself is important as you recover from depression. In time, your symptoms will fade as your treatment takes hold. Do not give up. Instead, focus your energy on getting better. Your mood will improve. It just takes some time. Focus on things that can help you feel better, such as being with friends and family, eating well, and getting enough rest. But take things slowly. Do not do too much too soon. You will begin to feel better gradually. Follow-up care is a key part of your treatment and safety. Be sure to make and go to all appointments, and call your doctor if you are having problems. It's also a good idea to know your test results and keep a list of the medicines you take. How can you care for yourself at home? Be realistic  · If you have a large task to do, break it up into smaller steps you can handle, and just do what you can. · You may want to put off important decisions until your depression has lifted. If you have plans that will have a major impact on your life, such as marriage, divorce, or a job change, try to wait a bit. Talk it over with friends and loved ones who can help you look at the overall picture first.  · Reaching out to people for help is important. Do not isolate yourself. Let your family and friends help you. Find someone you can trust and confide in, and talk to that person. · Be patient, and be kind to yourself. Remember that depression is not your fault and is not something you can overcome with willpower alone. Treatment is necessary for depression, just like for any other illness. Feeling better takes time, and your mood will improve little by little. Stay active  · Stay busy and get outside. Take a walk, or try some other light exercise. · Talk with your doctor about an exercise program. Exercise can help with mild depression. · Go to a movie or concert.  Take part in a Jainism activity or other social gathering. Go to a Secret game. · Ask a friend to have dinner with you. Take care of yourself  · Eat a balanced diet with plenty of fresh fruits and vegetables, whole grains, and lean protein. If you have lost your appetite, eat small snacks rather than large meals. · Avoid drinking alcohol or using illegal drugs. Do not take medicines that have not been prescribed for you. They may interfere with medicines you may be taking for depression, or they may make your depression worse. · Take your medicines exactly as they are prescribed. You may start to feel better within 1 to 3 weeks of taking antidepressant medicine. But it can take as many as 6 to 8 weeks to see more improvement. If you have questions or concerns about your medicines, or if you do not notice any improvement by 3 weeks, talk to your doctor. · If you have any side effects from your medicine, tell your doctor. Antidepressants can make you feel tired, dizzy, or nervous. Some people have dry mouth, constipation, headaches, sexual problems, or diarrhea. Many of these side effects are mild and will go away on their own after you have been taking the medicine for a few weeks. Some may last longer. Talk to your doctor if side effects are bothering you too much. You might be able to try a different medicine. · Get enough sleep. If you have problems sleeping:  ¨ Go to bed at the same time every night, and get up at the same time every morning. ¨ Keep your bedroom dark and quiet. ¨ Do not exercise after 5:00 p.m. ¨ Avoid drinks with caffeine after 5:00 p.m. · Avoid sleeping pills unless they are prescribed by the doctor treating your depression. Sleeping pills may make you groggy during the day, and they may interact with other medicine you are taking. · If you have any other illnesses, such as diabetes, heart disease, or high blood pressure, make sure to continue with your treatment.  Tell your doctor about all of the medicines you take, including those with or without a prescription. · Keep the numbers for these national suicide hotlines: 0-578-068-TALK (5-580.682.1186) and 1-299-WXSNTGK (3-854.729.5674). If you or someone you know talks about suicide or feeling hopeless, get help right away. When should you call for help? Call 911 anytime you think you may need emergency care. For example, call if:  · You feel like hurting yourself or someone else. · Someone you know has depression and is about to attempt or is attempting suicide. Call your doctor now or seek immediate medical care if:  · You hear voices. · Someone you know has depression and:  ¨ Starts to give away his or her possessions. ¨ Uses illegal drugs or drinks alcohol heavily. ¨ Talks or writes about death, including writing suicide notes or talking about guns, knives, or pills. ¨ Starts to spend a lot of time alone. ¨ Acts very aggressively or suddenly appears calm. Watch closely for changes in your health, and be sure to contact your doctor if:  · You do not get better as expected. Where can you learn more? Go to http://desean-leo.info/. Enter L895 in the search box to learn more about \"Recovering From Depression: Care Instructions. \"  Current as of: July 26, 2016  Content Version: 11.1  © 3740-6423 [employer]. Care instructions adapted under license by [employer] (which disclaims liability or warranty for this information). If you have questions about a medical condition or this instruction, always ask your healthcare professional. [address] any warranty or liability for your use of this information.

## 2017-03-06 NOTE — MR AVS SNAPSHOT
Visit Information Date & Time Provider Department Dept. Phone Encounter #  
 3/6/2017 11:15 AM Ankush Cota, [address] [facility] [phone] [id number] Follow-up Instructions Return in about 3 weeks (around 3/27/2017) for medication evaluation. Upcoming Health Maintenance Date Due  
 HPV AGE 9Y-34Y (1 of 3 - Female 3 Dose Series) 5/16/2002 DTaP/Tdap/Td series (1 - Tdap) 5/16/2012 PAP AKA CERVICAL CYTOLOGY 5/16/2012 Allergies as of 3/6/2017  Review Complete On: 3/6/2017 By: Ankush Cota MD  
 No Known Allergies Current Immunizations  Reviewed on 2/28/2017 Name Date Influenza Vaccine 10/8/2016 Influenza Vaccine (Quad) PF 1/15/2016 10:25 AM  
  
 Not reviewed this visit You Were Diagnosed With   
  
 Codes Comments Recurrent major depression in partial remission ([facility] [address]    -  Primary ICD-10-CM: F33.41 
ICD-9-CM: 296.35 Hypothyroidism due to acquired atrophy of thyroid     ICD-10-CM: E03.4 ICD-9-CM: 244.8, 246.8 Morbid obesity with BMI of 45.0-49.9, adult (HCC)     ICD-10-CM: E66.01, Z68.42 
ICD-9-CM: 278.01, V85.42 Vitals BP Pulse Temp Resp Height(growth percentile) Weight(growth percentile) (!) 147/92 89 98.6 °F (37 °C) (Oral) 18 5' 2\" (1.575 m) 259 lb (117.5 kg) LMP SpO2 BMI OB Status Smoking Status 02/20/2017 98% 47.37 kg/m2 Having regular periods Former Smoker Vitals History BMI and BSA Data Body Mass Index Body Surface Area  
 47.37 kg/m 2 2.27 m 2 Preferred Pharmacy Pharmacy Name Phone [facility] [address] Your Updated Medication List  
  
   
This list is accurate as of: 3/6/17 12:21 PM.  Always use your most recent med list.  
  
  
  
  
 amphetamine-dextroamphetamine XR 30 mg XR capsule Commonly known as:  ADDERALL XR Take 1 Cap (30 mg total) by mouth every morningIndications: ATTENTION-DEFICIT HYPERACTIVITY DISORDER, depression adjunct. .  Max Daily Amount: 30 mg  
  
 buPROPion  mg SR tablet Commonly known as:  Stacey Jayroom Take 1 Tab by mouth daily for 30 days. Indications: ANXIETY WITH DEPRESSION, ATTENTION-DEFICIT HYPERACTIVITY DISORDER, major depressive disorder, SMOKING CESSATION  
  
 labetalol 100 mg tablet Commonly known as:  Lennis Marilee Take 1 Tab by mouth daily for 30 days. Indications: hypertension  
  
 levothyroxine 300 mcg tablet Commonly known as:  SYNTHROID Take 1 Tab by mouth Daily (before breakfast) for 30 days. Indications: hypothyroidism  
  
 nitrofurantoin (macrocrystal-monohydrate) 100 mg capsule Commonly known as:  MACROBID Take 1 Cap by mouth two (2) times a day. nitrofurantoin 100 mg capsule Commonly known as:  MACRODANTIN Take 1 Cap by mouth two (2) times a day for 2 days. Indications: E. COLI URINARY TRACT INFECTION  
  
 PNV no.24-iron-folic acid-dha -005 mg-mcg-mg Cmpk Take  by mouth. Follow-up Instructions Return in about 3 weeks (around 3/27/2017) for medication evaluation. Patient Instructions Recovering From Depression: Care Instructions Your Care Instructions Taking good care of yourself is important as you recover from depression. In time, your symptoms will fade as your treatment takes hold. Do not give up. Instead, focus your energy on getting better. Your mood will improve. It just takes some time. Focus on things that can help you feel better, such as being with friends and family, eating well, and getting enough rest. But take things slowly. Do not do too much too soon. You will begin to feel better gradually. Follow-up care is a key part of your treatment and safety. Be sure to make and go to all appointments, and call your doctor if you are having problems. It's also a good idea to know your test results and keep a list of the medicines you take. How can you care for yourself at home? Be realistic · If you have a large task to do, break it up into smaller steps you can handle, and just do what you can. · You may want to put off important decisions until your depression has lifted. If you have plans that will have a major impact on your life, such as marriage, divorce, or a job change, try to wait a bit. Talk it over with friends and loved ones who can help you look at the overall picture first. 
· Reaching out to people for help is important. Do not isolate yourself. Let your family and friends help you. Find someone you can trust and confide in, and talk to that person. · Be patient, and be kind to yourself. Remember that depression is not your fault and is not something you can overcome with willpower alone. Treatment is necessary for depression, just like for any other illness. Feeling better takes time, and your mood will improve little by little. Stay active · Stay busy and get outside. Take a walk, or try some other light exercise. · Talk with your doctor about an exercise program. Exercise can help with mild depression. · Go to a movie or concert. Take part in a [employer] activity or other social gathering. Go to a ball game. · Ask a friend to have dinner with you. Take care of yourself · Eat a balanced diet with plenty of fresh fruits and vegetables, whole grains, and lean protein. If you have lost your appetite, eat small snacks rather than large meals. · Avoid drinking alcohol or using illegal drugs. Do not take medicines that have not been prescribed for you. They may interfere with medicines you may be taking for depression, or they may make your depression worse. · Take your medicines exactly as they are prescribed. You may start to feel better within 1 to 3 weeks of taking antidepressant medicine. But it can take as many as 6 to 8 weeks to see more improvement.  If you have questions or concerns about your medicines, or if you do not notice any improvement by 3 weeks, talk to your doctor. · If you have any side effects from your medicine, tell your doctor. Antidepressants can make you feel tired, dizzy, or nervous. Some people have dry mouth, constipation, headaches, sexual problems, or diarrhea. Many of these side effects are mild and will go away on their own after you have been taking the medicine for a few weeks. Some may last longer. Talk to your doctor if side effects are bothering you too much. You might be able to try a different medicine. · Get enough sleep. If you have problems sleeping: ¨ Go to bed at the same time every night, and get up at the same time every morning. ¨ Keep your bedroom dark and quiet. ¨ Do not exercise after 5:00 p.m. ¨ Avoid drinks with caffeine after 5:00 p.m. · Avoid sleeping pills unless they are prescribed by the doctor treating your depression. Sleeping pills may make you groggy during the day, and they may interact with other medicine you are taking. · If you have any other illnesses, such as diabetes, heart disease, or high blood pressure, make sure to continue with your treatment. Tell your doctor about all of the medicines you take, including those with or without a prescription. · Keep the numbers for these national suicide hotlines: 1-664-175-TALK (3-817.168.7859) and 1-232-TQOMPZN (1-204.554.4772). If you or someone you know talks about suicide or feeling hopeless, get help right away. When should you call for help? Call 911 anytime you think you may need emergency care. For example, call if: 
· You feel like hurting yourself or someone else. · Someone you know has depression and is about to attempt or is attempting suicide. Call your doctor now or seek immediate medical care if: 
· You hear voices. · Someone you know has depression and: 
¨ Starts to give away his or her possessions. ¨ Uses illegal drugs or drinks alcohol heavily. ¨ Talks or writes about death, including writing suicide notes or talking about guns, knives, or pills. ¨ Starts to spend a lot of time alone. ¨ Acts very aggressively or suddenly appears calm. Watch closely for changes in your health, and be sure to contact your doctor if: 
· You do not get better as expected. Where can you learn more? Go to http://desean-leo.info/. Enter O584 in the search box to learn more about \"Recovering From Depression: Care Instructions. \" Current as of: July 26, 2016 Content Version: 11.1 © 2632-1242 [employer]. Care instructions adapted under license by [employer] (which disclaims liability or warranty for this information). If you have questions about a medical condition or this instruction, always ask your healthcare professional. [address] any warranty or liability for your use of this information. Introducing [facility] & HEALTH SERVICES! Dear Nathaly Sheffield: Thank you for requesting a [employer] account. Our records indicate that you already have an active [employer] account. You can access your account anytime at [email or web address] Did you know that you can access your hospital and ER discharge instructions at any time in [employer]? You can also review all of your test results from your hospital stay or ER visit. Additional Information If you have questions, please visit the Frequently Asked Questions section of the [employer] website at [email or web address] Remember, [employer] is NOT to be used for urgent needs. For medical emergencies, dial 911. Now available from your iPhone and Android! Please provide this summary of care documentation to your next provider. Your primary care clinician is listed as Phil Osgood. If you have any questions after today's visit, please call [phone].

## 2017-03-06 NOTE — PROGRESS NOTES
Dennie Shackleton is a 23 y.o.  female and presents with    Chief Complaint   Patient presents with   [facility] Follow Up     depression       Subjective:  Ms. Elaine Wilson presents for f/u from hospital admission for suicidal ideation. she was started on bupropion and adderall while inpatient. she feels better. she is now 4 months post partum. she denies suicidal ideation. she states that she feels tired during the middle of the day. Thyroid Review:  Patient is seen for followup of hypothyroidism. Thyroid ROS: denies fatigue, weight changes, heat/cold intolerance, bowel/skin changes or CVS symptoms. Depression Review:  Patient is seen for followup of depression. Treatment includes Wellbutrin, adderall and was referred to community service board but has not made an appointment. Ongoing symptoms include depressed mood, anhedonia and difficulty concentrating. She denies anhedonia, insomnia, fatigue, feelings of worthlessness/guilt, hopelessness and recurrent thoughts of death. She experiences the following side effects from the treatment: midday somnolence with adderall.       Patient Active Problem List   Diagnosis Code    THYROID DYSFUNC-ANTEPART O99.280, E07.9    HABITUAL ABORTER ANTEPARTUM COMPLICATION B63.02    EPILEPSY COMPLIC ANTEPARTUM     Dysmenorrhea N94.6    Migraine G43.909    Anxiety F41.9    Suicide attempt by beta blocker overdose ([facility] [address] T44.7X2A    Depression F32.9     Patient Active Problem List    Diagnosis Date Noted    Depression 03/01/2017    Suicide attempt by beta blocker overdose ([facility] [address] 02/27/2017    Anxiety 02/02/2016    Migraine 07/02/2014    Dysmenorrhea 04/26/2012    THYROID DYSFUNC-ANTEPART 11/30/2011    HABITUAL ABORTER ANTEPARTUM COMPLICATION 02/63/0561    EPILEPSY COMPLIC ANTEPARTUM 77/98/6742     Current Outpatient Prescriptions   Medication Sig Dispense Refill    amphetamine-dextroamphetamine XR (ADDERALL XR) 30 mg XR capsule Take 1 Cap (30 mg total) by mouth every morningIndications: ATTENTION-DEFICIT HYPERACTIVITY DISORDER, depression adjunct. Arnaldo [place] Daily Amount: 30 mg 30 Cap 0    buPROPion SR (WELLBUTRIN SR) 150 mg SR tablet Take 1 Tab by mouth daily for 30 days. Indications: ANXIETY WITH DEPRESSION, ATTENTION-DEFICIT HYPERACTIVITY DISORDER, major depressive disorder, SMOKING CESSATION 30 Tab 0    labetalol (NORMODYNE) 100 mg tablet Take 1 Tab by mouth daily for 30 days. Indications: hypertension 30 Tab 0    levothyroxine (SYNTHROID) 300 mcg tablet Take 1 Tab by mouth Daily (before breakfast) for 30 days. Indications: hypothyroidism 30 Tab 0    nitrofurantoin (MACRODANTIN) 100 mg capsule Take 1 Cap by mouth two (2) times a day for 2 days. Indications: E. COLI URINARY TRACT INFECTION 4 Cap 0    nitrofurantoin, macrocrystal-monohydrate, (MACROBID) 100 mg capsule Take 1 Cap by mouth two (2) times a day. 10 Cap 0    PNV no.24-iron-folic acid-dha -656 mg-mcg-mg cmpk Take  by mouth.        No Known Allergies  Past Medical History:   Diagnosis Date    Depression 3/1/2017    Endocrine disease Hypothyroidism    Hypertension     Seizures ([address]     epilepsy    Thyroid disease      Past Surgical History:   Procedure Laterality Date    HX TONSILLECTOMY      Age 7     Family History   Problem Relation Age of Onset    Diabetes Other      great grandmother    Hypertension Other      family history    Thyroid Disease Other      great grandmother     Social History   Substance Use Topics    Smoking status: Former Smoker     Packs/day: 0.50     Years: 5.00    Smokeless tobacco: Never Used    Alcohol use 0.0 oz/week     0 Standard drinks or equivalent per week      Comment: sometimes       ROS   General ROS: negative for - chills or fever  Ophthalmic ROS: negative for - blurry vision  ENT ROS: negative for - headaches  Respiratory ROS: no cough, shortness of breath, or wheezing  Cardiovascular ROS: no chest pain or dyspnea on exertion  Gastrointestinal ROS: no abdominal pain, change in bowel habits, or black or bloody stools  Genito-Urinary ROS: no dysuria, trouble voiding, or hematuria  Neurological ROS: negative for - numbness/tingling or weakness  Dermatological ROS: negative for - rash or skin lesion changes    All other systems reviewed and are negative. Objective:  Vitals:    03/06/17 1138 03/06/17 1143   BP: (!) 148/96 (!) 147/92   Pulse: 89    Resp: 18    Temp: 98.6 °F (37 °C)    TempSrc: Oral    SpO2: 98%    Weight: 259 lb (117.5 kg)    Height: 5' 2\" (1.575 m)    PainSc:   5    PainLoc: Neck    LMP: 02/20/2017       alert, well appearing, and in no distress, oriented to person, place, and time and morbidly obese  Mental status - normal mood, behavior, speech, dress, motor activity, and thought processes  Mouth - mucous membranes moist, pharynx normal without lesions  Neck - supple, no significant adenopathy  Chest - clear to auscultation, no wheezes, rales or rhonchi, symmetric air entry  Heart - normal rate, regular rhythm, normal S1, S2, no murmurs, rubs, clicks or gallops  Extremities - peripheral pulses normal, no pedal edema, no clubbing or cyanosis  Skin - normal coloration and turgor, no rashes, no suspicious skin lesions noted    LABS   TSH >100    Assessment/Plan:    1. Recurrent major depression in partial remission ([facility]  Initially treated by inpatient psychiatry; pt denies SI; f/u with [facility] and return for medication evaluation    2. Hypothyroidism due to acquired atrophy of thyroid  Continue current dose of synthroid; difficult to maintain therapeutic     3. Morbid obesity with BMI of 45.0-49.9, adult ([facility]  I have reviewed/discussed the above normal BMI with the patient. I have recommended the following interventions: dietary management education, guidance, and counseling, dietary needs education and encourage exercise . The plan is as follows: I have counseled this patient on diet and exercise regimens.  .          Lab review: labs reviewed, I note that TSH abnormal high      I have discussed the diagnosis with the patient and the intended plan as seen in the above orders. The patient has received an after-visit summary and questions were answered concerning future plans. I have discussed medication side effects and warnings with the patient as well. I have reviewed the plan of care with the patient, accepted their input and they are in agreement with the treatment goals. Follow-up Disposition:  Return in about 3 weeks (around 3/27/2017) for medication evaluation.

## 2017-03-27 ENCOUNTER — OFFICE VISIT (OUTPATIENT)
Dept: FAMILY MEDICINE CLINIC | Age: 27
End: 2017-03-27

## 2017-03-27 VITALS
TEMPERATURE: 97.5 F | WEIGHT: 252 LBS | HEART RATE: 86 BPM | RESPIRATION RATE: 17 BRPM | SYSTOLIC BLOOD PRESSURE: 142 MMHG | OXYGEN SATURATION: 99 % | DIASTOLIC BLOOD PRESSURE: 86 MMHG | HEIGHT: 62 IN | BODY MASS INDEX: 46.38 KG/M2

## 2017-03-27 DIAGNOSIS — I10 ESSENTIAL HYPERTENSION WITH GOAL BLOOD PRESSURE LESS THAN 140/90: ICD-10-CM

## 2017-03-27 DIAGNOSIS — E66.01 MORBID OBESITY WITH BMI OF 45.0-49.9, ADULT (HCC): ICD-10-CM

## 2017-03-27 DIAGNOSIS — E03.4 HYPOTHYROIDISM DUE TO ACQUIRED ATROPHY OF THYROID: ICD-10-CM

## 2017-03-27 DIAGNOSIS — F33.41 RECURRENT MAJOR DEPRESSION IN PARTIAL REMISSION (HCC): Primary | ICD-10-CM

## 2017-03-27 DIAGNOSIS — F90.0 ATTENTION DEFICIT HYPERACTIVITY DISORDER (ADHD), PREDOMINANTLY INATTENTIVE TYPE: ICD-10-CM

## 2017-03-27 RX ORDER — BUPROPION HYDROCHLORIDE 200 MG/1
200 TABLET, EXTENDED RELEASE ORAL 2 TIMES DAILY
Qty: 60 TAB | Refills: 0 | Status: SHIPPED | OUTPATIENT
Start: 2017-03-27 | End: 2017-04-26

## 2017-03-27 RX ORDER — CLONIDINE HYDROCHLORIDE 0.1 MG/1
0.1 TABLET ORAL 2 TIMES DAILY
Qty: 60 TAB | Refills: 0 | Status: SHIPPED | OUTPATIENT
Start: 2017-03-27

## 2017-03-27 RX ORDER — ACETAMINOPHEN AND CODEINE PHOSPHATE 120; 12 MG/5ML; MG/5ML
0.35 SOLUTION ORAL
COMMUNITY
Start: 2016-11-12

## 2017-03-27 RX ORDER — DEXTROAMPHETAMINE SACCHARATE, AMPHETAMINE ASPARTATE MONOHYDRATE, DEXTROAMPHETAMINE SULFATE AND AMPHETAMINE SULFATE 7.5; 7.5; 7.5; 7.5 MG/1; MG/1; MG/1; MG/1
30 CAPSULE, EXTENDED RELEASE ORAL
Qty: 30 CAP | Refills: 0 | Status: SHIPPED | OUTPATIENT
Start: 2017-04-04 | End: 2017-05-04

## 2017-03-27 NOTE — PROGRESS NOTES
Jaquelin Fontenot is a 23 y.o.  female and presents with    Chief Complaint   Patient presents with    Medication Evaluation       Subjective:  Hypertension  Patient is here for follow-up of hypertension. She indicates that she is feeling well and denies any symptoms referable to her hypertension. She is not exercising and is adherent to low salt diet. Blood pressure is well controlled at home. Use of agents associated with hypertension: thyroid hormones. Thyroid Review:  Patient is seen for followup of hypothyroidism. Thyroid ROS: fatigue and weight loss. Depression Review:  Patient is seen for followup of depression. Treatment includes Wellbutrin and individual therapy. Ongoing symptoms include depressed mood, insomnia and difficulty concentrating. She denies depressed mood, anhedonia, psychomotor retardation, feelings of worthlessness/guilt and recurrent thoughts of death. She experiences the following side effects from the treatment: none.     Patient Active Problem List   Diagnosis Code    THYROID DYSFUNC-ANTEPART O99.280, E07.9    HABITUAL ABORTER ANTEPARTUM COMPLICATION K29.56    EPILEPSY COMPLIC ANTEPARTUM     Dysmenorrhea N94.6    Migraine G43.909    Anxiety F41.9    Suicide attempt by beta blocker overdose ([facility] [address] T44.7X2A    Depression F32.9     Patient Active Problem List    Diagnosis Date Noted    Depression 03/01/2017    Suicide attempt by beta blocker overdose ([facility] [address] 02/27/2017    Anxiety 02/02/2016    Migraine 07/02/2014    Dysmenorrhea 04/26/2012    THYROID DYSFUNC-ANTEPART 11/30/2011    HABITUAL ABORTER ANTEPARTUM COMPLICATION 74/24/4561    EPILEPSY COMPLIC ANTEPARTUM 81/78/0345     Current Outpatient Prescriptions   Medication Sig Dispense Refill    norethindrone (MICRONOR) 0.35 mg tab 0.35 mg.      amphetamine-dextroamphetamine XR (ADDERALL XR) 30 mg XR capsule Take 1 Cap (30 mg total) by mouth every morningIndications: ATTENTION-DEFICIT HYPERACTIVITY DISORDER, depression adjunct. Izabel Lank Daily Amount: 30 mg 30 Cap 0    buPROPion SR (WELLBUTRIN SR) 150 mg SR tablet Take 1 Tab by mouth daily for 30 days. Indications: ANXIETY WITH DEPRESSION, ATTENTION-DEFICIT HYPERACTIVITY DISORDER, major depressive disorder, SMOKING CESSATION 30 Tab 0    labetalol (NORMODYNE) 100 mg tablet Take 1 Tab by mouth daily for 30 days. Indications: hypertension 30 Tab 0    levothyroxine (SYNTHROID) 300 mcg tablet Take 1 Tab by mouth Daily (before breakfast) for 30 days. Indications: hypothyroidism 30 Tab 0    PNV no.24-iron-folic acid-dha -312 mg-mcg-mg cmpk Take  by mouth.        No Known Allergies  Past Medical History:   Diagnosis Date    Depression 3/1/2017    Endocrine disease Hypothyroidism    Hypertension     Seizures ([facility] [address]     epilepsy    Thyroid disease      Past Surgical History:   Procedure Laterality Date    HX TONSILLECTOMY      Age 7     Family History   Problem Relation Age of Onset    Diabetes Other      great grandmother    Hypertension Other      family history    Thyroid Disease Other      great grandmother     Social History   Substance Use Topics    Smoking status: Former Smoker     Packs/day: 0.50     Years: 5.00    Smokeless tobacco: Never Used    Alcohol use 0.0 oz/week     0 Standard drinks or equivalent per week      Comment: sometimes       ROS   General ROS: negative for - chills or fever  Ophthalmic ROS: negative for - blurry vision  ENT ROS: negative for - headaches  Endocrine ROS: negative for - polydipsia/polyuria  Respiratory ROS: no cough, shortness of breath, or wheezing  Cardiovascular ROS: no chest pain or dyspnea on exertion  Gastrointestinal ROS: no abdominal pain, change in bowel habits, or black or bloody stools  Genito-Urinary ROS: no dysuria, trouble voiding, or hematuria  Musculoskeletal ROS: negative for - joint pain or muscle pain  Neurological ROS: negative for - numbness/tingling or weakness  Dermatological ROS: negative for - rash or skin lesion changes    All other systems reviewed and are negative. Objective:  Vitals:    03/27/17 1213   BP: 142/86   Pulse: 86   Resp: 17   Temp: 97.5 °F (36.4 °C)   TempSrc: Oral   SpO2: 99%   Weight: 252 lb (114.3 kg)   Height: 5' 2\" (1.575 m)   PainSc:   0 - No pain   LMP: 02/22/2017       alert, well appearing, and in no distress, oriented to person, place, and time and morbidly obese  Mental status - depressed mood  Chest - clear to auscultation, no wheezes, rales or rhonchi, symmetric air entry  Heart - normal rate, regular rhythm, normal S1, S2, no murmurs, rubs, clicks or gallops  Neurological - cranial nerves II through XII intact, normal muscle tone, no tremors, strength 5/5, normal gait and station    Assessment/Plan:    1. Recurrent major depression in partial remission (HCC)  Increase bupropion; no adverse side effects  - buPROPion SR (WELLBUTRIN, ZYBAN) 200 mg SR tablet; Take 1 Tab by mouth two (2) times a day for 30 days. Indications: major depressive disorder, SMOKING CESSATION  Dispense: 60 Tab; Refill: 0    2. Hypothyroidism due to acquired atrophy of thyroid  Refer to endocrinology    3. Morbid obesity with BMI of 45.0-49.9, adult (HCC)  Encourage healthy diet and exercise; better control of hypothyroid    4. Essential hypertension with goal blood pressure less than 140/90  Borderline controlled; start clonidine  - cloNIDine HCl (CATAPRES) 0.1 mg tablet; Take 1 Tab by mouth two (2) times a day. Indications: hypertension  Dispense: 60 Tab; Refill: 0    5. Attention deficit hyperactivity disorder (ADHD), predominantly inattentive type  Continue treatment; no evidence of abuse; encourage hydration  - amphetamine-dextroamphetamine XR (ADDERALL XR) 30 mg XR capsule; Take 1 Cap (30 mg total) by mouth every morningIndications: ATTENTION-DEFICIT HYPERACTIVITY DISORDER, depression adjunct. Earliest Fill Date: 4/4/17. Max Daily Amount: 30 mg  Dispense: 30 Cap;  Refill: 0    Lab review: labs reviewed, I note that TSH abnormal high      I have discussed the diagnosis with the patient and the intended plan as seen in the above orders. The patient has received an after-visit summary and questions were answered concerning future plans. I have discussed medication side effects and warnings with the patient as well. I have reviewed the plan of care with the patient, accepted their input and they are in agreement with the treatment goals. Follow-up Disposition:  Return in about 1 month (around 4/27/2017) for medication evaluation.

## 2017-03-27 NOTE — PROGRESS NOTES
Progress Note    Patient: Yazmin Coronel MRN: [id number]  SSN: [id number]    YOB: 1990  Age: 23 y.o. Sex: female        Subjective:     A 23year old  female presents for follow up of post-partum depression and hospitalization for suicide ideation. Wellbutrin and Adderall regimen was initiated early in March but denies any adverse side effects. Today she reports little to none improvement in her depressive symptoms. She is still experiencing excessive worry, hopelessness, and feeling overwhelmed. She is the primary caregiver for her 2 month old son. Her parents are lend limited help. She denies any suicidal or homicidal ideations. Objective:     Visit Vitals    /86 (BP 1 Location: Right arm, BP Patient Position: Sitting)    Pulse 86    Temp 97.5 °F (36.4 °C) (Oral)    Resp 17    Ht 5' 2\" (1.575 m)    Wt 252 lb (114.3 kg)    SpO2 99%    Breastfeeding No    BMI 46.09 kg/m2     Physical Exam:   GENERAL: alert, cooperative, no distress, appears stated age, morbidly obese  LUNG: clear to auscultation bilaterally  HEART: regular rate and rhythm, S1, S2 normal, no murmur, click, rub or gallop  PSYCHIATRIC: depressed      Assessment:   1) post partum depression  2) hypothyroid     Plan:   1) increase Wellbutrin, encouraged to follow up and initiate care with [employer]  2) referred to endocrine    Signed By: Bobby Ambrose     March 27, 2017          *ATTENTION:  This note has been created by a medical student for educational purposes only. Please do not refer to the content of this note for clinical decision-making, billing, or other purposes. Please see attending physicians note to obtain clinical information on this patient. *

## 2017-03-27 NOTE — PATIENT INSTRUCTIONS
Depression and Chronic Disease: Care Instructions  Your Care Instructions  A chronic disease is one that you have for a long time. Some chronic diseases can be controlled, but they usually cannot be cured. Depression is common in people with chronic diseases, but it often goes unnoticed. Many people have concerns about seeking treatment for a mental health problem. You may think it's a sign of weakness, or you don't want people to know about it. It's important to overcome these reasons for not seeking treatment. Treating depression or anxiety is good for your health. Follow-up care is a key part of your treatment and safety. Be sure to make and go to all appointments, and call your doctor if you are having problems. It's also a good idea to know your test results and keep a list of the medicines you take. How can you care for yourself at home? Watch for symptoms of depression  The symptoms of depression are often subtle at first. You may think they are caused by your disease rather than depression. Or you may think it is normal to be depressed when you have a chronic disease. If you are depressed you may:  · Feel sad or hopeless. · Feel guilty or worthless. · Not enjoy the things you used to enjoy. · Feel hopeless, as though life is not worth living. · Have trouble thinking or remembering. · Have low energy, and you may not eat or sleep well. · Pull away from others. · Think often about death or killing yourself. (Keep the numbers for these national suicide hotlines: 9-018-800-TALK [1-572.772.5165] and 1-198-RJJAJJR [1-146.989.8016]. )  Get treatment  By treating your depression, you can feel more hopeful and have more energy. If you feel better, you may take better care of yourself, so your health may improve. · Talk to your doctor if you have any changes in mood during treatment for your disease. · Ask your doctor for help.  Counseling, antidepressant medicine, or a combination of the two can help most people with depression. Often a combination works best. Counseling can also help you cope with having a chronic disease. When should you call for help? Call 911 anytime you think you may need emergency care. For example, call if:  · You feel like hurting yourself or someone else. · Someone you know has depression and is about to attempt or is attempting suicide. Call your doctor now or seek immediate medical care if:  · You hear voices. · Someone you know has depression and:  ¨ Starts to give away his or her possessions. ¨ Uses illegal drugs or drinks alcohol heavily. ¨ Talks or writes about death, including writing suicide notes or talking about guns, knives, or pills. ¨ Starts to spend a lot of time alone. ¨ Acts very aggressively or suddenly appears calm. Watch closely for changes in your health, and be sure to contact your doctor if:  · You do not get better as expected. Where can you learn more? Go to http://desean-leo.info/. Enter B902 in the search box to learn more about \"Depression and Chronic Disease: Care Instructions. \"  Current as of: July 26, 2016  Content Version: 11.1  © 8038-4002 [employer]. Care instructions adapted under license by [employer] (which disclaims liability or warranty for this information). If you have questions about a medical condition or this instruction, always ask your healthcare professional. [address] any warranty or liability for your use of this information.

## 2017-03-27 NOTE — MR AVS SNAPSHOT
Visit Information Date & Time Provider Department Dept. Phone Encounter #  
 3/27/2017 12:45 PM [facility] [phone] [id number] Follow-up Instructions Return in about 1 month (around 4/27/2017) for medication evaluation. Upcoming Health Maintenance Date Due  
 HPV AGE 9Y-34Y (1 of 3 - Female 3 Dose Series) 5/16/2002 DTaP/Tdap/Td series (1 - Tdap) 5/16/2012 PAP AKA CERVICAL CYTOLOGY 5/16/2012 Allergies as of 3/27/2017  Review Complete On: 3/27/2017 By: Johana Clifford MD  
 No Known Allergies Current Immunizations  Reviewed on 2/28/2017 Name Date Influenza Vaccine 10/8/2016 Influenza Vaccine (Quad) PF 1/15/2016 10:25 AM  
  
 Not reviewed this visit You Were Diagnosed With   
  
 Codes Comments Recurrent major depression in partial remission ([facility]    -  Primary ICD-10-CM: F33.41 
ICD-9-CM: 296.35 Hypothyroidism due to acquired atrophy of thyroid     ICD-10-CM: E03.4 ICD-9-CM: 244.8, 246.8 Morbid obesity with BMI of 45.0-49.9, adult (HCC)     ICD-10-CM: E66.01, Z68.42 
ICD-9-CM: 278.01, V85.42 Essential hypertension with goal blood pressure less than 140/90     ICD-10-CM: I10 
ICD-9-CM: 401.9 Attention deficit hyperactivity disorder (ADHD), predominantly inattentive type     ICD-10-CM: F90.0 ICD-9-CM: 314.00 Vitals BP Pulse Temp Resp Height(growth percentile) Weight(growth percentile) 142/86 (BP 1 Location: Right arm, BP Patient Position: Sitting) 86 97.5 °F (36.4 °C) (Oral) 17 5' 2\" (1.575 m) 252 lb (114.3 kg) LMP SpO2 Breastfeeding? BMI OB Status Smoking Status 02/22/2017 (Exact Date) 99% No 46.09 kg/m2 Having regular periods Former Smoker Vitals History BMI and BSA Data Body Mass Index Body Surface Area 46.09 kg/m 2 2.24 m 2 Preferred Pharmacy Pharmacy Name Phone [facility] [address] [phone] Your Updated Medication List  
  
   
This list is accurate as of: 3/27/17  1:10 PM.  Always use your most recent med list.  
  
  
  
  
 amphetamine-dextroamphetamine XR 30 mg XR capsule Commonly known as:  ADDERALL XR Take 1 Cap (30 mg total) by mouth every morningIndications: ATTENTION-DEFICIT HYPERACTIVITY DISORDER, depression adjunct. Earliest Fill Date: 4/4/17. Max Daily Amount: 30 mg  
Start taking on:  4/4/2017  
  
 buPROPion  mg SR tablet Commonly known as:  Maryelizabeth Expose Take 1 Tab by mouth two (2) times a day for 30 days. Indications: major depressive disorder, SMOKING CESSATION  
  
 cloNIDine HCl 0.1 mg tablet Commonly known as:  CATAPRES Take 1 Tab by mouth two (2) times a day. Indications: hypertension  
  
 labetalol 100 mg tablet Commonly known as:  Reggie Snow Take 1 Tab by mouth daily for 30 days. Indications: hypertension  
  
 levothyroxine 300 mcg tablet Commonly known as:  SYNTHROID Take 1 Tab by mouth Daily (before breakfast) for 30 days. Indications: hypothyroidism  
  
 norethindrone 0.35 mg Tab Commonly known as:  MICRONOR  
0.35 mg. PNV no.24-iron-folic acid-dha -116 mg-mcg-mg Cmpk Take  by mouth. Prescriptions Printed Refills  
 amphetamine-dextroamphetamine XR (ADDERALL XR) 30 mg XR capsule 0 Starting on: 4/4/2017 Sig: Take 1 Cap (30 mg total) by mouth every morningIndications: ATTENTION-DEFICIT HYPERACTIVITY DISORDER, depression adjunct. Earliest Fill Date: 4/4/17. Max Daily Amount: 30 mg  
 Class: Print Route: Oral  
  
Prescriptions Sent to Pharmacy Refills buPROPion SR (WELLBUTRIN, ZYBAN) 200 mg SR tablet 0 Sig: Take 1 Tab by mouth two (2) times a day for 30 days. Indications: major depressive disorder, SMOKING CESSATION Class: Normal  
 Pharmacy: [address] [place] [address], [address] [place] [address] Ph #: [phone]  Route: Oral  
 cloNIDine HCl (CATAPRES) 0.1 mg tablet 0 Sig: Take 1 Tab by mouth two (2) times a day. Indications: hypertension Class: Normal  
 Pharmacy: [address] [place] [address], [address] [place] [address] #: [phone] Route: Oral  
  
Follow-up Instructions Return in about 1 month (around 4/27/2017) for medication evaluation. Patient Instructions Depression and Chronic Disease: Care Instructions Your Care Instructions A chronic disease is one that you have for a long time. Some chronic diseases can be controlled, but they usually cannot be cured. Depression is common in people with chronic diseases, but it often goes unnoticed. Many people have concerns about seeking treatment for a mental health problem. You may think it's a sign of weakness, or you don't want people to know about it. It's important to overcome these reasons for not seeking treatment. Treating depression or anxiety is good for your health. Follow-up care is a key part of your treatment and safety. Be sure to make and go to all appointments, and call your doctor if you are having problems. It's also a good idea to know your test results and keep a list of the medicines you take. How can you care for yourself at home? Watch for symptoms of depression The symptoms of depression are often subtle at first. You may think they are caused by your disease rather than depression. Or you may think it is normal to be depressed when you have a chronic disease. If you are depressed you may: · Feel sad or hopeless. · Feel guilty or worthless. · Not enjoy the things you used to enjoy. · Feel hopeless, as though life is not worth living. · Have trouble thinking or remembering. · Have low energy, and you may not eat or sleep well. · Pull away from others. · Think often about death or killing yourself.  (Keep the numbers for these national suicide hotlines: 4-696-715-TALK [1-683.680.9686] and 8-536-KISLLJH [8-944-695-1007]. ) Get treatment By treating your depression, you can feel more hopeful and have more energy. If you feel better, you may take better care of yourself, so your health may improve. · Talk to your doctor if you have any changes in mood during treatment for your disease. · Ask your doctor for help. Counseling, antidepressant medicine, or a combination of the two can help most people with depression. Often a combination works best. Counseling can also help you cope with having a chronic disease. When should you call for help? Call 911 anytime you think you may need emergency care. For example, call if: 
· You feel like hurting yourself or someone else. · Someone you know has depression and is about to attempt or is attempting suicide. Call your doctor now or seek immediate medical care if: 
· You hear voices. · Someone you know has depression and: 
¨ Starts to give away his or her possessions. ¨ Uses illegal drugs or drinks alcohol heavily. ¨ Talks or writes about death, including writing suicide notes or talking about guns, knives, or pills. ¨ Starts to spend a lot of time alone. ¨ Acts very aggressively or suddenly appears calm. Watch closely for changes in your health, and be sure to contact your doctor if: 
· You do not get better as expected. Where can you learn more? Go to http://desean-leo.info/. Enter D684 in the search box to learn more about \"Depression and Chronic Disease: Care Instructions. \" Current as of: July 26, 2016 Content Version: 11.1 © 3436-8393 [employer]. Care instructions adapted under license by [employer] (which disclaims liability or warranty for this information). If you have questions about a medical condition or this instruction, always ask your healthcare professional. [place] [address] any warranty or liability for your use of this information. Introducing [facility]! Dear Betty Li: Thank you for requesting a Socialmoth account. Our records indicate that you already have an active Socialmoth account. You can access your account anytime at [email or web address]/One On One Ads Did you know that you can access your hospital and ER discharge instructions at any time in Socialmoth? You can also review all of your test results from your hospital stay or ER visit. Additional Information If you have questions, please visit the Frequently Asked Questions section of the Socialmoth website at [email or web address] [email or web address] Remember, Socialmoth is NOT to be used for urgent needs. For medical emergencies, dial 911. Now available from your iPhone and Android! Please provide this summary of care documentation to your next provider. Your primary care clinician is listed as Kim Silva. If you have any questions after today's visit, please call [phone].

## 2017-05-02 ENCOUNTER — DOCUMENTATION ONLY (OUTPATIENT)
Dept: FAMILY MEDICINE CLINIC | Age: 27
End: 2017-05-02

## 2017-05-02 NOTE — LETTER
5/2/2017 Carmen Tiffany [address] [facility] 
[address] [place] [address] Dear Ms. Carmen Allen, We had an appointment reserved for you 4/26/2017 and were concerned when you did not show or call within 24 hours to cancel the appointment. Our policy is to call patients two days prior to their appointment to remind them of the date and time. We perform these calls as a courtesy to our patients and to allow us the opportunity to rebook the time slot should the appointment not be necessary. Recognizing that everyones time is valuable and that appointment time is limited, we ask that you provide 24 hours notice if you are unable to keep your appointment. Please call us at your earliest convenience to reschedule your appointment as your provider felt it was important to see you. Thank you for your anticipated cooperation. The scheduling staff: 
 
[address] [place] [address],[address] [place] [address] [phone]

## 2020-06-29 ENCOUNTER — HOSPITAL ENCOUNTER (EMERGENCY)
Age: 30
Discharge: HOME | End: 2020-06-29
Attending: EMERGENCY MEDICINE
Payer: MEDICAID

## 2020-06-29 ENCOUNTER — APPOINTMENT (OUTPATIENT)
Dept: CT IMAGING | Age: 30
End: 2020-06-29
Attending: EMERGENCY MEDICINE
Payer: MEDICAID

## 2020-06-29 VITALS
TEMPERATURE: 98.4 F | OXYGEN SATURATION: 99 % | HEART RATE: 75 BPM | SYSTOLIC BLOOD PRESSURE: 159 MMHG | DIASTOLIC BLOOD PRESSURE: 92 MMHG | HEIGHT: 62 IN | RESPIRATION RATE: 18 BRPM | WEIGHT: 220 LBS | BODY MASS INDEX: 40.48 KG/M2
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
## 2020-06-29 DIAGNOSIS — S30.1XXA CONTUSION OF ABDOMINAL WALL, INITIAL ENCOUNTER: ICD-10-CM

## 2020-06-29 DIAGNOSIS — S16.1XXA STRAIN OF NECK MUSCLE, INITIAL ENCOUNTER: ICD-10-CM

## 2020-06-29 LAB
ALBUMIN SERPL-MCNC: 3.7 G/DL (ref 3.4–5)
ALBUMIN/GLOB SERPL: 1 {RATIO} (ref 0.8–1.7)
ALP SERPL-CCNC: 71 U/L (ref 45–117)
ALT SERPL-CCNC: 20 U/L (ref 13–56)
ANION GAP SERPL CALC-SCNC: 4 MMOL/L (ref 3–18)
AST SERPL-CCNC: 17 U/L (ref 10–38)
BASOPHILS # BLD: 0 K/UL (ref 0–0.1)
BASOPHILS NFR BLD: 0 % (ref 0–2)
BILIRUB SERPL-MCNC: 0.3 MG/DL (ref 0.2–1)
BUN SERPL-MCNC: 18 MG/DL (ref 7–18)
BUN/CREAT SERPL: 16 (ref 12–20)
CALCIUM SERPL-MCNC: 9 MG/DL (ref 8.5–10.1)
CHLORIDE SERPL-SCNC: 105 MMOL/L (ref 100–111)
CO2 SERPL-SCNC: 29 MMOL/L (ref 21–32)
CREAT SERPL-MCNC: 1.1 MG/DL (ref 0.6–1.3)
DIFFERENTIAL METHOD BLD: ABNORMAL
EOSINOPHIL # BLD: 0.2 K/UL (ref 0–0.4)
EOSINOPHIL NFR BLD: 2 % (ref 0–5)
ERYTHROCYTE [DISTWIDTH] IN BLOOD BY AUTOMATED COUNT: 13.9 % (ref 11.6–14.5)
ETHANOL SERPL-MCNC: <3 MG/DL (ref 0–3)
GLOBULIN SER CALC-MCNC: 3.7 G/DL (ref 2–4)
GLUCOSE SERPL-MCNC: 105 MG/DL (ref 74–99)
HCG SERPL QL: NEGATIVE
HCT VFR BLD AUTO: 35.6 % (ref 35–45)
HGB BLD-MCNC: 11.3 G/DL (ref 12–16)
LYMPHOCYTES # BLD: 2.7 K/UL (ref 0.9–3.6)
LYMPHOCYTES NFR BLD: 29 % (ref 21–52)
MCH RBC QN AUTO: 28.8 PG (ref 24–34)
MCHC RBC AUTO-ENTMCNC: 31.7 G/DL (ref 31–37)
MCV RBC AUTO: 90.8 FL (ref 74–97)
MONOCYTES # BLD: 0.5 K/UL (ref 0.05–1.2)
MONOCYTES NFR BLD: 5 % (ref 3–10)
NEUTS SEG # BLD: 5.9 K/UL (ref 1.8–8)
NEUTS SEG NFR BLD: 64 % (ref 40–73)
PLATELET # BLD AUTO: 275 K/UL (ref 135–420)
PMV BLD AUTO: 11.9 FL (ref 9.2–11.8)
POTASSIUM SERPL-SCNC: 3.6 MMOL/L (ref 3.5–5.5)
PROT SERPL-MCNC: 7.4 G/DL (ref 6.4–8.2)
RBC # BLD AUTO: 3.92 M/UL (ref 4.2–5.3)
SODIUM SERPL-SCNC: 138 MMOL/L (ref 136–145)
WBC # BLD AUTO: 9.2 K/UL (ref 4.6–13.2)

## 2020-06-29 PROCEDURE — 74177 CT ABD & PELVIS W/CONTRAST: CPT

## 2020-06-29 PROCEDURE — 99285 EMERGENCY DEPT VISIT HI MDM: CPT

## 2020-06-29 PROCEDURE — 80307 DRUG TEST PRSMV CHEM ANLYZR: CPT

## 2020-06-29 PROCEDURE — 74011250636 HC RX REV CODE- 250/636: Performed by: EMERGENCY MEDICINE

## 2020-06-29 PROCEDURE — 85025 COMPLETE CBC W/AUTO DIFF WBC: CPT

## 2020-06-29 PROCEDURE — 74011250637 HC RX REV CODE- 250/637: Performed by: EMERGENCY MEDICINE

## 2020-06-29 PROCEDURE — 74011636320 HC RX REV CODE- 636/320: Performed by: EMERGENCY MEDICINE

## 2020-06-29 PROCEDURE — 96375 TX/PRO/DX INJ NEW DRUG ADDON: CPT

## 2020-06-29 PROCEDURE — 96374 THER/PROPH/DIAG INJ IV PUSH: CPT

## 2020-06-29 PROCEDURE — 80053 COMPREHEN METABOLIC PANEL: CPT

## 2020-06-29 PROCEDURE — L0140 CERVICAL SEMI-RIGID ADJUSTAB: HCPCS

## 2020-06-29 PROCEDURE — 72125 CT NECK SPINE W/O DYE: CPT

## 2020-06-29 PROCEDURE — 84703 CHORIONIC GONADOTROPIN ASSAY: CPT

## 2020-06-29 RX ORDER — MORPHINE SULFATE 4 MG/ML
4 INJECTION INTRAVENOUS
Status: DISCONTINUED | OUTPATIENT
Start: 2020-06-29 | End: 2020-06-29

## 2020-06-29 RX ORDER — ONDANSETRON 2 MG/ML
4 INJECTION INTRAMUSCULAR; INTRAVENOUS
Status: COMPLETED | OUTPATIENT
Start: 2020-06-29 | End: 2020-06-29

## 2020-06-29 RX ORDER — ACETAMINOPHEN 500 MG
1000 TABLET ORAL
Qty: 40 TAB | Refills: 0 | Status: SHIPPED | OUTPATIENT
Start: 2020-06-29

## 2020-06-29 RX ORDER — MORPHINE SULFATE 4 MG/ML
4 INJECTION, SOLUTION INTRAMUSCULAR; INTRAVENOUS
Status: COMPLETED | OUTPATIENT
Start: 2020-06-29 | End: 2020-06-29

## 2020-06-29 RX ORDER — IBUPROFEN 600 MG/1
600 TABLET ORAL
Status: COMPLETED | OUTPATIENT
Start: 2020-06-29 | End: 2020-06-29

## 2020-06-29 RX ORDER — IBUPROFEN 600 MG/1
600 TABLET ORAL
Qty: 20 TAB | Refills: 0 | Status: SHIPPED | OUTPATIENT
Start: 2020-06-29

## 2020-06-29 RX ADMIN — ONDANSETRON 4 MG: 2 INJECTION INTRAMUSCULAR; INTRAVENOUS at 02:40

## 2020-06-29 RX ADMIN — IOPAMIDOL 100 ML: 612 INJECTION, SOLUTION INTRAVENOUS at 03:41

## 2020-06-29 RX ADMIN — SODIUM CHLORIDE 1000 ML: 900 INJECTION, SOLUTION INTRAVENOUS at 02:40

## 2020-06-29 RX ADMIN — IBUPROFEN 600 MG: 600 TABLET, FILM COATED ORAL at 04:39

## 2020-06-29 RX ADMIN — MORPHINE SULFATE 4 MG: 4 INJECTION, SOLUTION INTRAMUSCULAR; INTRAVENOUS at 02:40
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
## 2020-06-29 NOTE — LETTER
NOTIFICATION RETURN TO WORK / SCHOOL 
 
6/29/2020 3:54 AM 
 
Ms. Michael Holliday [address] [facility] [address] [id number] To Whom It May Concern: 
 
Michael Holliday is currently under the care of [facility] EMERGENCY DEPT. She will return to work/school on: 7/1/20 If there are questions or concerns please have the patient contact our office. Sincerely, Otilio Hernandez MD
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
## 2022-03-19 PROBLEM — F32.A DEPRESSION: Status: ACTIVE | Noted: 2017-03-01

## 2022-03-19 PROBLEM — T44.7X2A SUICIDE ATTEMPT BY BETA BLOCKER OVERDOSE (HCC): Status: ACTIVE | Noted: 2017-02-27

## 2023-12-26 ENCOUNTER — HOSPITAL ENCOUNTER (EMERGENCY)
Facility: HOSPITAL | Age: 33
Discharge: LEFT BEFORE DISCHARGE | End: 2023-12-27

## 2023-12-26 VITALS
DIASTOLIC BLOOD PRESSURE: 91 MMHG | HEART RATE: 80 BPM | SYSTOLIC BLOOD PRESSURE: 153 MMHG | RESPIRATION RATE: 16 BRPM | HEIGHT: 61 IN | WEIGHT: 293 LBS | BODY MASS INDEX: 55.32 KG/M2 | TEMPERATURE: 97.5 F | OXYGEN SATURATION: 98 %

## 2023-12-27 NOTE — ED NOTES
Patient still had not obtained urine sample. Tech out to see patient and was going to assist patient to the bathroom but patient said, \"nope, don't worry about it, I have been out here for too long and my ride is on its way. \" Patient is now refusing to be seen.

## 2023-12-27 NOTE — ED NOTES
Patient was given a urine cup and asked to obtain a urine sample. Patient states that it is hard for her to walk. Patient is in a wheelchair and wheelchair was pushed as close to the bathroom as possible. Patient walks at home and is able to walk short distances.

## 2024-03-11 ENCOUNTER — HOSPITAL ENCOUNTER (INPATIENT)
Facility: HOSPITAL | Age: 34
LOS: 1 days | Discharge: LEFT BEFORE DISCHARGE | DRG: 885 | End: 2024-03-14
Attending: EMERGENCY MEDICINE | Admitting: PSYCHIATRY & NEUROLOGY

## 2024-03-11 ENCOUNTER — APPOINTMENT (OUTPATIENT)
Facility: HOSPITAL | Age: 34
DRG: 885 | End: 2024-03-11

## 2024-03-11 DIAGNOSIS — K76.0 FATTY LIVER: ICD-10-CM

## 2024-03-11 DIAGNOSIS — F15.10 METHAMPHETAMINE ABUSE (HCC): ICD-10-CM

## 2024-03-11 DIAGNOSIS — N39.0 ACUTE URINARY TRACT INFECTION: ICD-10-CM

## 2024-03-11 DIAGNOSIS — F33.2 SEVERE EPISODE OF RECURRENT MAJOR DEPRESSIVE DISORDER, WITHOUT PSYCHOTIC FEATURES (HCC): ICD-10-CM

## 2024-03-11 DIAGNOSIS — R45.851 SUICIDAL IDEATION: Primary | ICD-10-CM

## 2024-03-11 LAB
ALBUMIN SERPL-MCNC: 3.9 G/DL (ref 3.4–5)
ALBUMIN/GLOB SERPL: 0.8 (ref 0.8–1.7)
ALP SERPL-CCNC: 125 U/L (ref 45–117)
ALT SERPL-CCNC: 20 U/L (ref 13–56)
AMPHET UR QL SCN: POSITIVE
ANION GAP SERPL CALC-SCNC: 6 MMOL/L (ref 3–18)
APPEARANCE UR: CLEAR
AST SERPL-CCNC: 21 U/L (ref 10–38)
BACTERIA URNS QL MICRO: ABNORMAL /HPF
BARBITURATES UR QL SCN: NEGATIVE
BASOPHILS # BLD: 0 K/UL (ref 0–0.1)
BASOPHILS NFR BLD: 0 % (ref 0–2)
BENZODIAZ UR QL: NEGATIVE
BILIRUB SERPL-MCNC: 0.4 MG/DL (ref 0.2–1)
BILIRUB UR QL: NEGATIVE
BUN SERPL-MCNC: 17 MG/DL (ref 7–18)
BUN/CREAT SERPL: 17 (ref 12–20)
CALCIUM SERPL-MCNC: 9.5 MG/DL (ref 8.5–10.1)
CANNABINOIDS UR QL SCN: NEGATIVE
CHLORIDE SERPL-SCNC: 105 MMOL/L (ref 100–111)
CO2 SERPL-SCNC: 32 MMOL/L (ref 21–32)
COCAINE UR QL SCN: NEGATIVE
COLOR UR: YELLOW
CREAT SERPL-MCNC: 1.03 MG/DL (ref 0.6–1.3)
DIFFERENTIAL METHOD BLD: ABNORMAL
EOSINOPHIL # BLD: 0 K/UL (ref 0–0.4)
EOSINOPHIL NFR BLD: 1 % (ref 0–5)
EPITH CASTS URNS QL MICRO: ABNORMAL /LPF (ref 0–5)
ERYTHROCYTE [DISTWIDTH] IN BLOOD BY AUTOMATED COUNT: 21.2 % (ref 11.6–14.5)
ETHANOL SERPL-MCNC: <3 MG/DL (ref 0–3)
GLOBULIN SER CALC-MCNC: 4.9 G/DL (ref 2–4)
GLUCOSE SERPL-MCNC: 115 MG/DL (ref 74–99)
GLUCOSE UR STRIP.AUTO-MCNC: NEGATIVE MG/DL
HCG UR QL: NEGATIVE
HCT VFR BLD AUTO: 29.6 % (ref 35–45)
HGB BLD-MCNC: 8 G/DL (ref 12–16)
HGB UR QL STRIP: ABNORMAL
IMM GRANULOCYTES # BLD AUTO: 0 K/UL (ref 0–0.04)
IMM GRANULOCYTES NFR BLD AUTO: 0 % (ref 0–0.5)
KETONES UR QL STRIP.AUTO: NEGATIVE MG/DL
LEUKOCYTE ESTERASE UR QL STRIP.AUTO: ABNORMAL
LYMPHOCYTES # BLD: 0.4 K/UL (ref 0.9–3.6)
LYMPHOCYTES NFR BLD: 11 % (ref 21–52)
Lab: ABNORMAL
MCH RBC QN AUTO: 19.7 PG (ref 24–34)
MCHC RBC AUTO-ENTMCNC: 27 G/DL (ref 31–37)
MCV RBC AUTO: 72.9 FL (ref 78–100)
METHADONE UR QL: NEGATIVE
MONOCYTES # BLD: 0 K/UL (ref 0.05–1.2)
MONOCYTES NFR BLD: 1 % (ref 3–10)
NEUTS SEG # BLD: 3.5 K/UL (ref 1.8–8)
NEUTS SEG NFR BLD: 88 % (ref 40–73)
NITRITE UR QL STRIP.AUTO: NEGATIVE
NRBC # BLD: 0 K/UL (ref 0–0.01)
NRBC BLD-RTO: 0 PER 100 WBC
OPIATES UR QL: NEGATIVE
PCP UR QL: NEGATIVE
PH UR STRIP: 6.5 (ref 5–8)
PLATELET # BLD AUTO: 316 K/UL (ref 135–420)
PMV BLD AUTO: 10.8 FL (ref 9.2–11.8)
POTASSIUM SERPL-SCNC: 3.6 MMOL/L (ref 3.5–5.5)
PROT SERPL-MCNC: 8.8 G/DL (ref 6.4–8.2)
PROT UR STRIP-MCNC: 30 MG/DL
RBC # BLD AUTO: 4.06 M/UL (ref 4.2–5.3)
RBC #/AREA URNS HPF: ABNORMAL /HPF (ref 0–5)
SODIUM SERPL-SCNC: 143 MMOL/L (ref 136–145)
SP GR UR REFRACTOMETRY: 1.01 (ref 1–1.03)
UROBILINOGEN UR QL STRIP.AUTO: 0.2 EU/DL (ref 0.2–1)
WBC # BLD AUTO: 4 K/UL (ref 4.6–13.2)
WBC URNS QL MICRO: ABNORMAL /HPF (ref 0–4)

## 2024-03-11 PROCEDURE — 80307 DRUG TEST PRSMV CHEM ANLYZR: CPT

## 2024-03-11 PROCEDURE — 81001 URINALYSIS AUTO W/SCOPE: CPT

## 2024-03-11 PROCEDURE — 74177 CT ABD & PELVIS W/CONTRAST: CPT

## 2024-03-11 PROCEDURE — 99285 EMERGENCY DEPT VISIT HI MDM: CPT

## 2024-03-11 PROCEDURE — 82077 ASSAY SPEC XCP UR&BREATH IA: CPT

## 2024-03-11 PROCEDURE — 80053 COMPREHEN METABOLIC PANEL: CPT

## 2024-03-11 PROCEDURE — 85025 COMPLETE CBC W/AUTO DIFF WBC: CPT

## 2024-03-11 PROCEDURE — 81025 URINE PREGNANCY TEST: CPT

## 2024-03-11 ASSESSMENT — PAIN - FUNCTIONAL ASSESSMENT
PAIN_FUNCTIONAL_ASSESSMENT: 0-10
PAIN_FUNCTIONAL_ASSESSMENT: 0-10

## 2024-03-11 ASSESSMENT — PAIN DESCRIPTION - LOCATION: LOCATION: ABDOMEN

## 2024-03-11 ASSESSMENT — PAIN DESCRIPTION - ORIENTATION: ORIENTATION: RIGHT;LOWER

## 2024-03-11 ASSESSMENT — PAIN SCALES - GENERAL
PAINLEVEL_OUTOF10: 10
PAINLEVEL_OUTOF10: 10

## 2024-03-11 NOTE — ED TRIAGE NOTES
Pt states she is having abdominal pain that goes to back.  Denies difficulty urinating,  does have nausea

## 2024-03-12 LAB
FLUAV RNA SPEC QL NAA+PROBE: NOT DETECTED
FLUBV RNA SPEC QL NAA+PROBE: NOT DETECTED
SARS-COV-2 RNA RESP QL NAA+PROBE: NOT DETECTED

## 2024-03-12 PROCEDURE — 2580000003 HC RX 258: Performed by: EMERGENCY MEDICINE

## 2024-03-12 PROCEDURE — 96375 TX/PRO/DX INJ NEW DRUG ADDON: CPT

## 2024-03-12 PROCEDURE — 96374 THER/PROPH/DIAG INJ IV PUSH: CPT

## 2024-03-12 PROCEDURE — 6360000004 HC RX CONTRAST MEDICATION: Performed by: EMERGENCY MEDICINE

## 2024-03-12 PROCEDURE — 94761 N-INVAS EAR/PLS OXIMETRY MLT: CPT

## 2024-03-12 PROCEDURE — 2700000000 HC OXYGEN THERAPY PER DAY

## 2024-03-12 PROCEDURE — 6370000000 HC RX 637 (ALT 250 FOR IP): Performed by: EMERGENCY MEDICINE

## 2024-03-12 PROCEDURE — 6360000002 HC RX W HCPCS: Performed by: EMERGENCY MEDICINE

## 2024-03-12 PROCEDURE — 87636 SARSCOV2 & INF A&B AMP PRB: CPT

## 2024-03-12 RX ORDER — CEPHALEXIN 250 MG/1
500 CAPSULE ORAL EVERY 6 HOURS SCHEDULED
Status: DISCONTINUED | OUTPATIENT
Start: 2024-03-12 | End: 2024-03-14 | Stop reason: HOSPADM

## 2024-03-12 RX ORDER — KETOROLAC TROMETHAMINE 15 MG/ML
15 INJECTION, SOLUTION INTRAMUSCULAR; INTRAVENOUS ONCE
Status: COMPLETED | OUTPATIENT
Start: 2024-03-12 | End: 2024-03-12

## 2024-03-12 RX ORDER — ACETAMINOPHEN 500 MG
1000 TABLET ORAL
Status: COMPLETED | OUTPATIENT
Start: 2024-03-12 | End: 2024-03-12

## 2024-03-12 RX ADMIN — WATER 1000 MG: 1 INJECTION INTRAMUSCULAR; INTRAVENOUS; SUBCUTANEOUS at 00:40

## 2024-03-12 RX ADMIN — CEPHALEXIN 500 MG: 250 CAPSULE ORAL at 17:48

## 2024-03-12 RX ADMIN — CEPHALEXIN 500 MG: 250 CAPSULE ORAL at 05:00

## 2024-03-12 RX ADMIN — CEPHALEXIN 500 MG: 250 CAPSULE ORAL at 12:30

## 2024-03-12 RX ADMIN — KETOROLAC TROMETHAMINE 15 MG: 15 INJECTION, SOLUTION INTRAMUSCULAR; INTRAVENOUS at 05:00

## 2024-03-12 RX ADMIN — ACETAMINOPHEN 1000 MG: 500 TABLET ORAL at 05:00

## 2024-03-12 RX ADMIN — IOPAMIDOL 100 ML: 612 INJECTION, SOLUTION INTRAVENOUS at 00:30

## 2024-03-12 NOTE — BSMART NOTE
Crisis note:    Attempted to interview patient. Patient kept falling asleep, snoring loudly. Patient did report she has \"bad\" sleep apnea, reports has a CPAP but has not been using it and has not been sleeping well.    Will attempt interview at a later time.

## 2024-03-12 NOTE — ED PROVIDER NOTES
EMERGENCY DEPARTMENT HISTORY AND PHYSICAL EXAM      Patient Name: Aditya Gusman  MRN: [id number]  YOB: 1990  Provider: Sydni Smith MD  PCP: Clayton King MD   Time/Date of evaluation: 11:01 PM EDT on 3/11/24    History of Presenting Illness     Chief Complaint   Patient presents with    Abdominal Pain    Suicidal       History Provided By: Patient     History (Narative):   Aditya Gusman is a 33 y.o. female with a PMHX of depression hypothyroidism, hypertension, seizures, and methamphetamine abuse  who presents to the emergency department  by POV C/O periumbilical and right lower quadrant abdominal pain that has been present for the past several weeks.  It radiates through to her back.  She has been nauseous but she has not had any vomiting.  She did have diarrhea at 1 point a couple of weeks ago.    The patient states that she is also suicidal without a plan.  She does have a history of depression and anxiety.  She states that she has had 2 prior suicide attempts in the past.  She also states that she uses methamphetamines.        Past History     Past Medical History:  Past Medical History:   Diagnosis Date    Depression 3/1/2017    Endocrine disease Hypothyroidism    Hypertension     Seizures (HCC)     epilepsy    Thyroid disease        Past Surgical History:  Past Surgical History:   Procedure Laterality Date    TONSILLECTOMY      Age 12       Family History:  Family History   Problem Relation Age of Onset    Diabetes Other         great grandmother    Thyroid Disease Other         great grandmother    Hypertension Other         family history       Social History:  Social History     Tobacco Use    Smoking status: Former     Current packs/day: 0.50     Types: Cigarettes    Smokeless tobacco: Never   Substance Use Topics    Alcohol use: Yes     Alcohol/week: 0.0 standard drinks of alcohol    Drug use: No       Medications:  Current Facility-Administered Medications   Medication Dose Route

## 2024-03-12 NOTE — BSMART NOTE
Crisis note:    [facility] to inquire about bed availability, spoke to Jessie, do have bed available but unable to accommodate a patient using a CPAP as \"there is no where to plug it in.\"

## 2024-03-12 NOTE — ED PROVIDER NOTES
ED continued care note    6:31 AM EDT  Signed out to me by Dr. Smith, patient with suicidal ideation and UTI, awaiting crisis assessment and treated for UTI.    Reviewed at 6:31 AM EDT  Patient Vitals for the past 12 hrs:   Temp Pulse Resp BP SpO2   03/12/24 0257 -- -- -- (!) 163/79 95 %   03/12/24 0157 -- -- -- (!) 170/87 96 %   03/12/24 0142 -- -- -- -- 97 %   03/12/24 0043 -- -- -- (!) 166/88 --   03/11/24 2154 -- (!) 102 -- (!) 171/97 96 %   03/11/24 1923 98.4 °F (36.9 °C) (!) 105 18 (!) 148/96 100 %            Clinical Impression:   1. Suicidal ideation    2. Severe episode of recurrent major depressive disorder, without psychotic features (HCC)    3. Methamphetamine abuse (HCC)    4. Acute urinary tract infection    5. Fatty liver         Miki Holt MD  03/14/24 5586

## 2024-03-12 NOTE — ED NOTES
Verbal shift change report given to Randolph (oncoming nurse) by Mahnaz (offgoing nurse). Report included the following information ED Encounter Summary, ED SBAR, and MAR.

## 2024-03-12 NOTE — BSMART NOTE
Behavioral Health Crisis Assessment    Chief Complaint: feeling suicidal and depressed \"I'm a shit person\"   Chief Complaint   Patient presents with    Abdominal Pain    Suicidal          Voluntary or Involuntary Status: voluntarily      C-SSRS current suicide Risk (High, Moderate, Low): moderate      Past Suicide Attempts (specify):  reports 2 past attempts both by overdose.      Self Injurious/Self Mutilation behaviors (specify): denied      Protective Factors (specify): Children      Risk Factors (specify): Suicidal   and Substance abuse        Substance use (current or past): (See Below)     Alcohol: denied  Date started:  Route:  Frequency:  Amount:  Last use:  Withdrawals:   Rehab/Inpatient Services:   Hx of seizures:  Hx of blackouts:     Heroin: denied  Date started:  Route:  Frequency:  Amount:  Last use:  Withdrawals:   Rehab/Inpatient Services:      Cocaine:  denied  Date started:   Route:  Frequency:  Amount:  Last use:   Withdrawals:   Rehab/Inpatient Services:     Marijuana: denied     Prescription/OTC Medications:  denied     Hallucinogenics: denied     Cigarettes/Cigars/Vapors: denied    Methamphetamine: began abusing at age 27. Last use was 3/11/24 used 1/4 of a gram, via smoking.         MH & Substance use Treatment  (current and/or past):  Inpatient: reports at [facility] in 2015 or 2016, [facility].  Outpatient: none.  [facility] in [place]-completed program 2023, stayed clean one month after discharge      Violence towards others (current and/or past:(specify): denied      Legal issues (current or past): past charges for possession and drug paraphernalia last year. Currently may have a warrant for Failure to Appear for a court date missed 4/18/23      Access to weapons: everyday common house hold items and sharps, denied access to guns.      Trauma or Abuse (specify): denied      Living  Situation: Living with her Parents, her two sons also living with them.      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 States she has bad sleep apnea and has not been using her CPAP. Also sleep altered due to methamphetamine abuse      Appetite: \"wonderful\"      Mental Status Exam: Patient presented as well-groomed, alert and oriented to person, place, time and situation. Patient is wearing blue hospital scrubs. Patients posture normal, Good eye contact and presented with cooperative attitude, Depressed  mood and Depressed affect. Thought process was Coherent, Intact, Goal directed, Logical, and Organized with Logical content. Memory adequate. Patient's speech was Goal directed . Judgement and insight are good.     Brief Clinical Summary: Patient is a 33 years-old Female who arrived at [facility] ED due to suicidal ideation, depression, and substance abuse. Patient is reporting depression with suicidal ideation with no current plan or intent but does not feel safe and is seeking inpatient treatment. Patient described herself as a \"shit person\". This statement came when discussing her abuse of methamphetamines. Patient currently feeling depressed, hopeless, helpless, and worthless. Denied any homicidal ideations. Denied any form of hallucinations. Denied paranoia. No delusional thoughts voiced.     Disposition: Will discuss with the on-call psychiatrist. Consulted with Dr. Perez. Can refer to conduit for a bed search due to lack of capacity to admit patient at [facility].    Encouraged patient to call her family to have her CPAP delivered to the ER so when potential placement is found she will have it available to go with her to the facility.

## 2024-03-12 NOTE — ED NOTES
RECEIVE PATIENT ASLEEP BUT EASILY AROUSED AT THIS TIME, PATIENT IN NIL RESPIRATORY DISTRESS. PATIENT ALERT AND ORIENTED. PATIENT UTILIZING 3L OXYGEN VIA NASAL CANULA  CHEST EXPANSION EQUAL AND ADEQUATE. PERIPHERAL IV NOTED TO LEFT HAND. NAD TO ABDOMEN. MOVEMENT AND SENSATION PRESENT TO ALL EXTREMITIES.      MSE: PATIENT APPROPRIATELY ATTIRED FOR OCCASION MOOD \"I FEEL CRAGGY\" AFFECT SLEEPY. PATIENT DENIES ALL FORM OF HALLUCINATION, ADMITS TO SUICIDAL IDEATION HOWEVER, STATES THAT SHE DOESN'T HAVE A PLAN.

## 2024-03-12 NOTE — ED NOTES
Assisted pt to restroom   Pt stated that she is having abdominal pain and very bad headache. MD notified   Gave pt a blanket

## 2024-03-12 NOTE — ED NOTES
PATIENT UP AND ABOUT, PATIENT AMBULATED TO RESTROOM, CALL PROVIDED, PATIENT SPOKE WITH RELATIVE ON PHONE

## 2024-03-12 NOTE — ED NOTES
PATIENT LEFT LYING IN BED, BREATHING EVIDENT, NO COMPLAINT VOICED. HAND OVER REPORT GIVEN TO BURAK CORMIER, CARE CONTINUES.  RELATIVE PRESENT. PATIENT FOR DISPOSITION

## 2024-03-12 NOTE — ED NOTES
PATIENT MEDICATED AS PER MAR, PATIENT INFORMED OF EFFECTS OF MEDICATION. ALLERGIES VERIFIED AT THIS TIME BY PATIENT.      PATIENT BEING SEEN BY CRISIS    PATIENT ASSISTED TO BED SIDE CHAIR AND GIVEN DIET

## 2024-03-12 NOTE — BSMART NOTE
Crisis note:    Patient continues to be sleeping soundly. Will attempt crisis evaluation at a later time.

## 2024-03-12 NOTE — BSMART NOTE
Crisis Note: Attempted crisis evaluation, however, patient is too groggy and can't keep her eyes open; will interview for crisis evaluation when patient is able to participate.

## 2024-03-12 NOTE — ED NOTES
PATIENT WOKEN UP FOR MEDICATION AND DIET, PATIENT SAT IN BED SIDE CHAIR TO TOLERATED.    CRISIS CONTACTED AND UPDATED

## 2024-03-13 PROBLEM — F33.2 MDD (MAJOR DEPRESSIVE DISORDER), RECURRENT SEVERE, WITHOUT PSYCHOSIS (HCC): Status: ACTIVE | Noted: 2024-03-13

## 2024-03-13 LAB
ERYTHROCYTE [DISTWIDTH] IN BLOOD BY AUTOMATED COUNT: 21.2 % (ref 11.6–14.5)
HCT VFR BLD AUTO: 27.4 % (ref 35–45)
HEMOCCULT STL QL: NEGATIVE
HGB BLD-MCNC: 7.5 G/DL (ref 12–16)
MCH RBC QN AUTO: 19.4 PG (ref 24–34)
MCHC RBC AUTO-ENTMCNC: 27.4 G/DL (ref 31–37)
MCV RBC AUTO: 70.8 FL (ref 78–100)
NRBC # BLD: 0 K/UL (ref 0–0.01)
NRBC BLD-RTO: 0 PER 100 WBC
PLATELET # BLD AUTO: 307 K/UL (ref 135–420)
PMV BLD AUTO: 11.2 FL (ref 9.2–11.8)
RBC # BLD AUTO: 3.87 M/UL (ref 4.2–5.3)
WBC # BLD AUTO: 10.1 K/UL (ref 4.6–13.2)

## 2024-03-13 PROCEDURE — 6370000000 HC RX 637 (ALT 250 FOR IP): Performed by: EMERGENCY MEDICINE

## 2024-03-13 PROCEDURE — 94761 N-INVAS EAR/PLS OXIMETRY MLT: CPT

## 2024-03-13 PROCEDURE — 1140000000 HC RM PRIVATE PSYCH

## 2024-03-13 PROCEDURE — 82272 OCCULT BLD FECES 1-3 TESTS: CPT

## 2024-03-13 PROCEDURE — 85027 COMPLETE CBC AUTOMATED: CPT

## 2024-03-13 PROCEDURE — 6360000002 HC RX W HCPCS: Performed by: INTERNAL MEDICINE

## 2024-03-13 RX ORDER — MAGNESIUM HYDROXIDE/ALUMINUM HYDROXICE/SIMETHICONE 120; 1200; 1200 MG/30ML; MG/30ML; MG/30ML
30 SUSPENSION ORAL EVERY 6 HOURS PRN
Status: DISCONTINUED | OUTPATIENT
Start: 2024-03-13 | End: 2024-03-14 | Stop reason: HOSPADM

## 2024-03-13 RX ORDER — HYDROXYZINE HYDROCHLORIDE 25 MG/1
25 TABLET, FILM COATED ORAL EVERY 6 HOURS PRN
Status: DISCONTINUED | OUTPATIENT
Start: 2024-03-13 | End: 2024-03-14 | Stop reason: HOSPADM

## 2024-03-13 RX ORDER — FERROUS SULFATE 325(65) MG
325 TABLET ORAL 2 TIMES DAILY WITH MEALS
Status: DISCONTINUED | OUTPATIENT
Start: 2024-03-14 | End: 2024-03-14 | Stop reason: HOSPADM

## 2024-03-13 RX ORDER — PANTOPRAZOLE SODIUM 40 MG/1
40 TABLET, DELAYED RELEASE ORAL
Status: DISCONTINUED | OUTPATIENT
Start: 2024-03-14 | End: 2024-03-14 | Stop reason: HOSPADM

## 2024-03-13 RX ORDER — TRAZODONE HYDROCHLORIDE 50 MG/1
50 TABLET ORAL NIGHTLY PRN
Status: DISCONTINUED | OUTPATIENT
Start: 2024-03-13 | End: 2024-03-14 | Stop reason: HOSPADM

## 2024-03-13 RX ORDER — POLYETHYLENE GLYCOL 3350 17 G/17G
17 POWDER, FOR SOLUTION ORAL DAILY PRN
Status: DISCONTINUED | OUTPATIENT
Start: 2024-03-13 | End: 2024-03-14 | Stop reason: HOSPADM

## 2024-03-13 RX ORDER — ACETAMINOPHEN 325 MG/1
650 TABLET ORAL EVERY 4 HOURS PRN
Status: DISCONTINUED | OUTPATIENT
Start: 2024-03-13 | End: 2024-03-14 | Stop reason: HOSPADM

## 2024-03-13 RX ADMIN — IRON SUCROSE 200 MG: 20 INJECTION, SOLUTION INTRAVENOUS at 22:56

## 2024-03-13 RX ADMIN — CEPHALEXIN 500 MG: 250 CAPSULE ORAL at 12:29

## 2024-03-13 RX ADMIN — CEPHALEXIN 500 MG: 250 CAPSULE ORAL at 00:10

## 2024-03-13 RX ADMIN — CEPHALEXIN 500 MG: 250 CAPSULE ORAL at 05:09

## 2024-03-13 RX ADMIN — CEPHALEXIN 500 MG: 250 CAPSULE ORAL at 18:14

## 2024-03-13 NOTE — BSMART NOTE
Crisis Note: Discussed with the on-call Psychiatrist, Dr. Cunningham, regarding inpatient admission with the following clinical summary and was accepted to [facility]. Patient will be transferred to unit pending psychiatrist review of patient's chart, medication/admit orders being placed and a bed assignment. Unit RN made aware of report. ED charge nurse and physician made aware of disposition.

## 2024-03-13 NOTE — ED NOTES
Pt medicated per MAR  Pt assisted to restroom  Pt provided new paper scrubs and complete linen change done on bed d/t incontinent episode.

## 2024-03-13 NOTE — BSMART NOTE
Crisis Note: Crisis called [facility], [phone], to order oxygen concentrator; however, it was reported that they did not have any oxygen concentrator.

## 2024-03-13 NOTE — BSMART NOTE
Crisis Note: On rounds, crisis attempted to reassess patient; however, patient was resting in bed with eyes closed and did not answer when her name was called several times. Patient will attempt to reassess patient at a later time.

## 2024-03-13 NOTE — ED NOTES
Took over pt care from CASA Eckert. Pt in room on stretcher awaiting bed placement to Dario Miller at the bed side. Pt on a pur-wic.

## 2024-03-13 NOTE — BSMART NOTE
Crisis Note: Patient continues to endorse SI with no plan. Patient reports noncompliance with her CPAP machine, unable to recall last use stating it has been over a year.

## 2024-03-13 NOTE — BSMART NOTE
After speaking with Octavio THOMAS, writer informed both patient and Babar CORMIER that once patient's CPAP is brought to the hospital, our BH unit will be able to admit patient.

## 2024-03-13 NOTE — ED NOTES
Attempted to take vitals signs, patient refused. Patient stated she wanted the RN to leave the room and didn't want her vitals taken. Patient screamed that she wants to go home.

## 2024-03-13 NOTE — ED PROVIDER NOTES
6:09 AM : Pt care assumed from Dr. Partida  ,ED provider. History of patient complaint(s), available diagnostic reports and current treatment plan has been discussed thoroughly.   Bedside rounding on patient occured : Yes  Medically cleared Yes  Status: Voluntary  Intended disposition of patient : likely inpatient, pending crisis eval.  Pending diagnostics reports and/or labs (please list): UTI tx with abx here.    Recent Results (from the past 12 hour(s))   COVID-19 & Influenza Combo    Collection Time: 03/12/24  6:30 PM    Specimen: Nasopharyngeal   Result Value Ref Range    SARS-CoV-2, PCR Not detected NOTD      Rapid Influenza A By PCR Not detected NOTD      Rapid Influenza B By PCR Not detected NOTD                      Les Bauer MD  03/14/24 6095

## 2024-03-13 NOTE — ED NOTES
Pt resting in position of comfort with eyes closed, breathing even and unlabored, NAD at this time, sitter at bedside

## 2024-03-14 VITALS
BODY MASS INDEX: 53.92 KG/M2 | DIASTOLIC BLOOD PRESSURE: 70 MMHG | HEART RATE: 94 BPM | WEIGHT: 293 LBS | HEIGHT: 62 IN | TEMPERATURE: 97.2 F | SYSTOLIC BLOOD PRESSURE: 114 MMHG | RESPIRATION RATE: 16 BRPM | OXYGEN SATURATION: 95 %

## 2024-03-14 PROCEDURE — 6370000000 HC RX 637 (ALT 250 FOR IP): Performed by: EMERGENCY MEDICINE

## 2024-03-14 PROCEDURE — 94761 N-INVAS EAR/PLS OXIMETRY MLT: CPT

## 2024-03-14 ASSESSMENT — LIFESTYLE VARIABLES
HOW OFTEN DO YOU HAVE A DRINK CONTAINING ALCOHOL: NEVER
HOW MANY STANDARD DRINKS CONTAINING ALCOHOL DO YOU HAVE ON A TYPICAL DAY: PATIENT DOES NOT DRINK

## 2024-03-14 NOTE — ED NOTES
Assumed care of pt in rm 18. Pt here for SI thoughts. Currently resting comfortable on stretcher, sitter at bedside.

## 2024-03-14 NOTE — PROGRESS NOTES
I was contacted by the ED physician that psych wanted clearance for per psych admission  for suicidal ideations; due to anemia; I have discussed this at length with the ED physician.  No official consult needed.    Patient is a 33-year-old female with history of depression hypothyroidism drug use admitted for suicidal ideations.  Found to have a UTI and some anemia.    As per ED she is guaiac negative denies having any bleeding issues.    Anemia looks like iron deficient anemia most likely menstrual related.    Patient already getting treated with p.o. antibiotic for UTI.    Patient H&H is 7.5 and 27 with low MCV.  Consistent with iron deficiency anemia.    Patient CT of the abdomen pelvis is negative.    Plan give IV iron and continue on p.o. iron and some MiraLAX.  I can add PPI.    Continue p.o. antibiotic for UTI.    Patient is okay to be transferred to psych for suicidal ideations.

## 2024-03-14 NOTE — ED NOTES
PT getting up and getting dressed to leave. PT states she does not need to be here anymore. PT removed PIV. PT urinated on self. PT not willing to listen to this RN or primary RN. PT non-redirectable at this time.

## 2024-03-14 NOTE — BSMART NOTE
Crisis Note: Writer received called from Dr. Cunningham requesting repeat CBC. Dr. Partida made aware.

## 2024-03-14 NOTE — ED NOTES
Pt resting comfortably in room on stretcher, sitter at the bedside. No needs at this time, awaiting room placement.

## 2024-03-14 NOTE — ED NOTES
Pt is voiced wanting to leave to the sitter. Pt not making any physical efforts to leave. NAD voiced or noted.     TDO petition has been faxed over for evaluation, awaiting TDO to be placed.

## 2024-03-14 NOTE — ED NOTES
Pt refused her morning medication. States she is no longer SI and wants to leave. Crisis updated and is awaiting CSB to speak with pt for possible TDO.

## 2024-03-14 NOTE — BSMART NOTE
Crisis Note: Writer spoke with on call nurse practitioner who recommends a TDO evaluation. Writer notified Vinh with [facility], [phone] of request.

## 2024-03-14 NOTE — PROGRESS NOTES
completed the initial Spiritual Assessment of the patient, and offered Pastoral Care, support to the patient in bed 18 of the emergency room where she will be admitted to the [facility] unit after 18 hours in the emergency room. Patient is not very responsive and very sleepy. There is no advance directive on file... Patient does not have any Yarsanism/cultural needs that will affect patient’s preferences in health care. Chaplains will continue to follow and will provide pastoral care on an as needed/requested basis.    macey Gates  Board Certified   Spiritual Care Department  [phone]

## 2024-03-14 NOTE — BSMART NOTE
Crisis Note: Writer spoke with Dr. Cunningham, who has rescinded behavioral health admission. Dr. Cunningham recommends hospitalist consult due to 7.5 hemoglobin. Dr. Partida and charge nurse made aware.

## 2024-03-14 NOTE — ACP (ADVANCE CARE PLANNING)
Advance Care Planning   Healthcare Decision Maker:    Primary Decision Maker: Bettie Gusman [facility] - [phone]    Click here to complete Healthcare Decision Makers including selection of the Healthcare Decision Maker Relationship (ie \"Primary\").

## 2024-03-14 NOTE — BSMART NOTE
Crisis Note: Patient requesting to discharge home. Patient currently denies suicidal/homicidal ideations. Patient also denies auditory, visual, tactile and olfactory hallucinations. Writer will contact on call nurse practitioner with above assessment.

## 2024-03-14 NOTE — ED NOTES
Informed by primary nurse that PT is stating she wants to leave. PT not currently making any efforts to leave. PT still resting in bed without distress. PT refusing all medication.

## 2024-03-21 ENCOUNTER — APPOINTMENT (OUTPATIENT)
Facility: HOSPITAL | Age: 34
DRG: 208 | End: 2024-03-21
Payer: MEDICAID

## 2024-03-21 ENCOUNTER — HOSPITAL ENCOUNTER (INPATIENT)
Facility: HOSPITAL | Age: 34
LOS: 4 days | Discharge: HOME HEALTH | DRG: 208 | End: 2024-03-25
Attending: EMERGENCY MEDICINE | Admitting: INTERNAL MEDICINE
Payer: MEDICAID

## 2024-03-21 DIAGNOSIS — J96.02 ACUTE RESPIRATORY FAILURE WITH HYPOXIA AND HYPERCAPNIA (HCC): ICD-10-CM

## 2024-03-21 DIAGNOSIS — R10.84 GENERALIZED ABDOMINAL PAIN: Primary | ICD-10-CM

## 2024-03-21 DIAGNOSIS — F15.10 METHAMPHETAMINE ABUSE (HCC): ICD-10-CM

## 2024-03-21 DIAGNOSIS — J96.01 ACUTE RESPIRATORY FAILURE WITH HYPOXIA AND HYPERCAPNIA (HCC): ICD-10-CM

## 2024-03-21 DIAGNOSIS — R10.33 PERIUMBILICAL ABDOMINAL PAIN: ICD-10-CM

## 2024-03-21 DIAGNOSIS — J18.9 MULTIFOCAL PNEUMONIA: ICD-10-CM

## 2024-03-21 DIAGNOSIS — D72.829 LEUKOCYTOSIS, UNSPECIFIED TYPE: ICD-10-CM

## 2024-03-21 DIAGNOSIS — N39.0 URINARY TRACT INFECTION WITHOUT HEMATURIA, SITE UNSPECIFIED: ICD-10-CM

## 2024-03-21 PROBLEM — E03.5 MYXEDEMA COMA (HCC): Status: ACTIVE | Noted: 2024-03-21

## 2024-03-21 PROBLEM — J96.00 ACUTE RESPIRATORY FAILURE (HCC): Status: ACTIVE | Noted: 2024-03-21

## 2024-03-21 LAB
ALBUMIN SERPL-MCNC: 3.6 G/DL (ref 3.4–5)
ALBUMIN/GLOB SERPL: 0.8 (ref 0.8–1.7)
ALP SERPL-CCNC: 110 U/L (ref 45–117)
ALT SERPL-CCNC: 23 U/L (ref 13–56)
AMPHET UR QL SCN: POSITIVE
ANION GAP SERPL CALC-SCNC: 5 MMOL/L (ref 3–18)
APPEARANCE UR: CLEAR
ARTERIAL PATENCY WRIST A: POSITIVE
AST SERPL-CCNC: 21 U/L (ref 10–38)
B PERT DNA SPEC QL NAA+PROBE: NOT DETECTED
BACTERIA URNS QL MICRO: ABNORMAL /HPF
BARBITURATES UR QL SCN: NEGATIVE
BASE DEFICIT BLD-SCNC: 0.5 MMOL/L
BASE EXCESS BLD CALC-SCNC: 0.7 MMOL/L
BASOPHILS # BLD: 0 K/UL (ref 0–0.1)
BASOPHILS NFR BLD: 0 % (ref 0–2)
BDY SITE: ABNORMAL
BENZODIAZ UR QL: NEGATIVE
BILIRUB SERPL-MCNC: 0.3 MG/DL (ref 0.2–1)
BILIRUB UR QL: NEGATIVE
BORDETELLA PARAPERTUSSIS BY PCR: NOT DETECTED
BUN SERPL-MCNC: 15 MG/DL (ref 7–18)
BUN/CREAT SERPL: 15 (ref 12–20)
C PNEUM DNA SPEC QL NAA+PROBE: NOT DETECTED
CALCIUM SERPL-MCNC: 9 MG/DL (ref 8.5–10.1)
CANNABINOIDS UR QL SCN: NEGATIVE
CHLORIDE SERPL-SCNC: 105 MMOL/L (ref 100–111)
CO2 SERPL-SCNC: 30 MMOL/L (ref 21–32)
COCAINE UR QL SCN: NEGATIVE
COLOR UR: YELLOW
CREAT SERPL-MCNC: 0.99 MG/DL (ref 0.6–1.3)
DIFFERENTIAL METHOD BLD: ABNORMAL
EOSINOPHIL # BLD: 0.2 K/UL (ref 0–0.4)
EOSINOPHIL NFR BLD: 1 % (ref 0–5)
EPITH CASTS URNS QL MICRO: ABNORMAL /LPF (ref 0–5)
ERYTHROCYTE [DISTWIDTH] IN BLOOD BY AUTOMATED COUNT: 23.7 % (ref 11.6–14.5)
ETHANOL SERPL-MCNC: <3 MG/DL (ref 0–3)
FLUAV RNA SPEC QL NAA+PROBE: NOT DETECTED
FLUAV SUBTYP SPEC NAA+PROBE: NOT DETECTED
FLUBV RNA SPEC QL NAA+PROBE: NOT DETECTED
FLUBV RNA SPEC QL NAA+PROBE: NOT DETECTED
GAS FLOW.O2 O2 DELIVERY SYS: ABNORMAL
GAS FLOW.O2 SETTING OXYMISER: 24 BPM
GLOBULIN SER CALC-MCNC: 4.6 G/DL (ref 2–4)
GLUCOSE BLD STRIP.AUTO-MCNC: 125 MG/DL (ref 70–110)
GLUCOSE SERPL-MCNC: 121 MG/DL (ref 74–99)
GLUCOSE UR STRIP.AUTO-MCNC: NEGATIVE MG/DL
HADV DNA SPEC QL NAA+PROBE: NOT DETECTED
HCG SERPL QL: NEGATIVE
HCG SERPL-ACNC: <1 MIU/ML (ref 0–10)
HCO3 BLD-SCNC: 26.2 MMOL/L (ref 22–26)
HCO3 BLD-SCNC: 28 MMOL/L (ref 22–26)
HCOV 229E RNA SPEC QL NAA+PROBE: NOT DETECTED
HCOV HKU1 RNA SPEC QL NAA+PROBE: NOT DETECTED
HCOV NL63 RNA SPEC QL NAA+PROBE: NOT DETECTED
HCOV OC43 RNA SPEC QL NAA+PROBE: NOT DETECTED
HCT VFR BLD AUTO: 32.3 % (ref 35–45)
HGB BLD-MCNC: 8.6 G/DL (ref 12–16)
HGB UR QL STRIP: NEGATIVE
HMPV RNA SPEC QL NAA+PROBE: NOT DETECTED
HPIV1 RNA SPEC QL NAA+PROBE: NOT DETECTED
HPIV2 RNA SPEC QL NAA+PROBE: NOT DETECTED
HPIV3 RNA SPEC QL NAA+PROBE: NOT DETECTED
HPIV4 RNA SPEC QL NAA+PROBE: NOT DETECTED
HYALINE CASTS URNS QL MICRO: ABNORMAL /LPF (ref 0–2)
IMM GRANULOCYTES # BLD AUTO: 0.3 K/UL (ref 0–0.04)
IMM GRANULOCYTES NFR BLD AUTO: 2 % (ref 0–0.5)
IPAP/PIP/HIGH PEEP: 15
KETONES UR QL STRIP.AUTO: NEGATIVE MG/DL
LACTATE BLD-SCNC: 1.11 MMOL/L (ref 0.4–2)
LACTATE SERPL-SCNC: 1.5 MMOL/L (ref 0.4–2)
LEUKOCYTE ESTERASE UR QL STRIP.AUTO: NEGATIVE
LIPASE SERPL-CCNC: 19 U/L (ref 13–75)
LYMPHOCYTES # BLD: 1.5 K/UL (ref 0.9–3.6)
LYMPHOCYTES NFR BLD: 10 % (ref 21–52)
Lab: ABNORMAL
M PNEUMO DNA SPEC QL NAA+PROBE: NOT DETECTED
MCH RBC QN AUTO: 20 PG (ref 24–34)
MCHC RBC AUTO-ENTMCNC: 26.6 G/DL (ref 31–37)
MCV RBC AUTO: 74.9 FL (ref 78–100)
METHADONE UR QL: NEGATIVE
MONOCYTES # BLD: 0.3 K/UL (ref 0.05–1.2)
MONOCYTES NFR BLD: 2 % (ref 3–10)
NEUTS SEG # BLD: 13.1 K/UL (ref 1.8–8)
NEUTS SEG NFR BLD: 85 % (ref 40–73)
NITRITE UR QL STRIP.AUTO: POSITIVE
NRBC # BLD: 0.03 K/UL (ref 0–0.01)
NRBC BLD-RTO: 0.2 PER 100 WBC
NT PRO BNP: 314 PG/ML (ref 0–450)
O2/TOTAL GAS SETTING VFR VENT: 4 %
O2/TOTAL GAS SETTING VFR VENT: 45 %
O2/TOTAL GAS SETTING VFR VENT: 50 %
OPIATES UR QL: NEGATIVE
PCO2 BLD: 45 MMHG (ref 35–45)
PCO2 BLD: 66.2 MMHG (ref 35–45)
PCO2 BLD: >110 MMHG (ref 35–45)
PCP UR QL: NEGATIVE
PEEP RESPIRATORY: 5 CMH2O
PEEP RESPIRATORY: 5 CMH2O
PH BLD: 7.01 (ref 7.35–7.45)
PH BLD: 7.23 (ref 7.35–7.45)
PH BLD: 7.37 (ref 7.35–7.45)
PH UR STRIP: 5.5 (ref 5–8)
PLATELET # BLD AUTO: 442 K/UL (ref 135–420)
PLATELET COMMENT: ABNORMAL
PMV BLD AUTO: 10.7 FL (ref 9.2–11.8)
PO2 BLD: 111 MMHG (ref 80–100)
PO2 BLD: 116 MMHG (ref 80–100)
PO2 BLD: 62 MMHG (ref 80–100)
POTASSIUM SERPL-SCNC: 3.6 MMOL/L (ref 3.5–5.5)
PRESSURE SUPPORT SETTING VENT: 10 CMH2O
PROT SERPL-MCNC: 8.2 G/DL (ref 6.4–8.2)
PROT UR STRIP-MCNC: 30 MG/DL
RBC # BLD AUTO: 4.31 M/UL (ref 4.2–5.3)
RBC #/AREA URNS HPF: NEGATIVE /HPF (ref 0–5)
RBC MORPH BLD: ABNORMAL
RESPIRATORY RATE, POC: 24 (ref 5–40)
RSV RNA SPEC QL NAA+PROBE: NOT DETECTED
RV+EV RNA SPEC QL NAA+PROBE: NOT DETECTED
SAO2 % BLD: 86 % (ref 92–97)
SAO2 % BLD: 98.2 % (ref 92–97)
SARS-COV-2 RNA RESP QL NAA+PROBE: NOT DETECTED
SARS-COV-2 RNA RESP QL NAA+PROBE: NOT DETECTED
SERVICE CMNT-IMP: ABNORMAL
SODIUM SERPL-SCNC: 140 MMOL/L (ref 136–145)
SP GR UR REFRACTOMETRY: 1.02 (ref 1–1.03)
SPECIMEN TYPE: ABNORMAL
T3FREE SERPL-MCNC: 0.5 PG/ML (ref 2.18–3.98)
T4 FREE SERPL-MCNC: 0.1 NG/DL (ref 0.7–1.5)
TROPONIN I SERPL HS-MCNC: 23 NG/L (ref 0–54)
TSH SERPL DL<=0.05 MIU/L-ACNC: 89.4 UIU/ML (ref 0.36–3.74)
UROBILINOGEN UR QL STRIP.AUTO: 0.2 EU/DL (ref 0.2–1)
VENTILATION MODE VENT: ABNORMAL
VT SETTING VENT: 460 ML
WBC # BLD AUTO: 15.4 K/UL (ref 4.6–13.2)
WBC URNS QL MICRO: ABNORMAL /HPF (ref 0–4)

## 2024-03-21 PROCEDURE — A4216 STERILE WATER/SALINE, 10 ML: HCPCS | Performed by: PHYSICIAN ASSISTANT

## 2024-03-21 PROCEDURE — 93005 ELECTROCARDIOGRAM TRACING: CPT | Performed by: PHYSICIAN ASSISTANT

## 2024-03-21 PROCEDURE — 2580000003 HC RX 258: Performed by: PHYSICIAN ASSISTANT

## 2024-03-21 PROCEDURE — 05HD33Z INSERTION OF INFUSION DEVICE INTO RIGHT CEPHALIC VEIN, PERCUTANEOUS APPROACH: ICD-10-PCS | Performed by: INTERNAL MEDICINE

## 2024-03-21 PROCEDURE — 74177 CT ABD & PELVIS W/CONTRAST: CPT

## 2024-03-21 PROCEDURE — 84484 ASSAY OF TROPONIN QUANT: CPT

## 2024-03-21 PROCEDURE — 87636 SARSCOV2 & INF A&B AMP PRB: CPT

## 2024-03-21 PROCEDURE — 84481 FREE ASSAY (FT-3): CPT

## 2024-03-21 PROCEDURE — 94761 N-INVAS EAR/PLS OXIMETRY MLT: CPT

## 2024-03-21 PROCEDURE — A4216 STERILE WATER/SALINE, 10 ML: HCPCS | Performed by: STUDENT IN AN ORGANIZED HEALTH CARE EDUCATION/TRAINING PROGRAM

## 2024-03-21 PROCEDURE — 87086 URINE CULTURE/COLONY COUNT: CPT

## 2024-03-21 PROCEDURE — 0202U NFCT DS 22 TRGT SARS-COV-2: CPT

## 2024-03-21 PROCEDURE — 5A09457 ASSISTANCE WITH RESPIRATORY VENTILATION, 24-96 CONSECUTIVE HOURS, CONTINUOUS POSITIVE AIRWAY PRESSURE: ICD-10-PCS | Performed by: INTERNAL MEDICINE

## 2024-03-21 PROCEDURE — 74018 RADEX ABDOMEN 1 VIEW: CPT

## 2024-03-21 PROCEDURE — 82077 ASSAY SPEC XCP UR&BREATH IA: CPT

## 2024-03-21 PROCEDURE — 87070 CULTURE OTHR SPECIMN AEROBIC: CPT

## 2024-03-21 PROCEDURE — 80053 COMPREHEN METABOLIC PANEL: CPT

## 2024-03-21 PROCEDURE — 6360000002 HC RX W HCPCS: Performed by: INTERNAL MEDICINE

## 2024-03-21 PROCEDURE — 80307 DRUG TEST PRSMV CHEM ANLYZR: CPT

## 2024-03-21 PROCEDURE — 5A1945Z RESPIRATORY VENTILATION, 24-96 CONSECUTIVE HOURS: ICD-10-PCS | Performed by: INTERNAL MEDICINE

## 2024-03-21 PROCEDURE — 36415 COLL VENOUS BLD VENIPUNCTURE: CPT

## 2024-03-21 PROCEDURE — 86800 THYROGLOBULIN ANTIBODY: CPT

## 2024-03-21 PROCEDURE — 87205 SMEAR GRAM STAIN: CPT

## 2024-03-21 PROCEDURE — 84443 ASSAY THYROID STIM HORMONE: CPT

## 2024-03-21 PROCEDURE — 94660 CPAP INITIATION&MGMT: CPT

## 2024-03-21 PROCEDURE — 71250 CT THORAX DX C-: CPT

## 2024-03-21 PROCEDURE — 84439 ASSAY OF FREE THYROXINE: CPT

## 2024-03-21 PROCEDURE — 70450 CT HEAD/BRAIN W/O DYE: CPT

## 2024-03-21 PROCEDURE — 81001 URINALYSIS AUTO W/SCOPE: CPT

## 2024-03-21 PROCEDURE — 86376 MICROSOMAL ANTIBODY EACH: CPT

## 2024-03-21 PROCEDURE — 84703 CHORIONIC GONADOTROPIN ASSAY: CPT

## 2024-03-21 PROCEDURE — 2500000003 HC RX 250 WO HCPCS: Performed by: STUDENT IN AN ORGANIZED HEALTH CARE EDUCATION/TRAINING PROGRAM

## 2024-03-21 PROCEDURE — 71045 X-RAY EXAM CHEST 1 VIEW: CPT

## 2024-03-21 PROCEDURE — 31500 INSERT EMERGENCY AIRWAY: CPT

## 2024-03-21 PROCEDURE — 2580000003 HC RX 258: Performed by: EMERGENCY MEDICINE

## 2024-03-21 PROCEDURE — 2000000000 HC ICU R&B

## 2024-03-21 PROCEDURE — 6360000004 HC RX CONTRAST MEDICATION: Performed by: PHYSICIAN ASSISTANT

## 2024-03-21 PROCEDURE — 2500000003 HC RX 250 WO HCPCS: Performed by: PHYSICIAN ASSISTANT

## 2024-03-21 PROCEDURE — 83880 ASSAY OF NATRIURETIC PEPTIDE: CPT

## 2024-03-21 PROCEDURE — 82962 GLUCOSE BLOOD TEST: CPT

## 2024-03-21 PROCEDURE — 6360000002 HC RX W HCPCS: Performed by: EMERGENCY MEDICINE

## 2024-03-21 PROCEDURE — 94002 VENT MGMT INPAT INIT DAY: CPT

## 2024-03-21 PROCEDURE — 2700000000 HC OXYGEN THERAPY PER DAY

## 2024-03-21 PROCEDURE — 83605 ASSAY OF LACTIC ACID: CPT

## 2024-03-21 PROCEDURE — 84702 CHORIONIC GONADOTROPIN TEST: CPT

## 2024-03-21 PROCEDURE — 51702 INSERT TEMP BLADDER CATH: CPT

## 2024-03-21 PROCEDURE — 82533 TOTAL CORTISOL: CPT

## 2024-03-21 PROCEDURE — 96360 HYDRATION IV INFUSION INIT: CPT

## 2024-03-21 PROCEDURE — 99291 CRITICAL CARE FIRST HOUR: CPT

## 2024-03-21 PROCEDURE — 96375 TX/PRO/DX INJ NEW DRUG ADDON: CPT

## 2024-03-21 PROCEDURE — 96361 HYDRATE IV INFUSION ADD-ON: CPT

## 2024-03-21 PROCEDURE — 85025 COMPLETE CBC W/AUTO DIFF WBC: CPT

## 2024-03-21 PROCEDURE — 36600 WITHDRAWAL OF ARTERIAL BLOOD: CPT

## 2024-03-21 PROCEDURE — 96365 THER/PROPH/DIAG IV INF INIT: CPT

## 2024-03-21 PROCEDURE — 96367 TX/PROPH/DG ADDL SEQ IV INF: CPT

## 2024-03-21 PROCEDURE — 87040 BLOOD CULTURE FOR BACTERIA: CPT

## 2024-03-21 PROCEDURE — 6360000002 HC RX W HCPCS: Performed by: PHYSICIAN ASSISTANT

## 2024-03-21 PROCEDURE — 2580000003 HC RX 258: Performed by: STUDENT IN AN ORGANIZED HEALTH CARE EDUCATION/TRAINING PROGRAM

## 2024-03-21 PROCEDURE — 99291 CRITICAL CARE FIRST HOUR: CPT | Performed by: INTERNAL MEDICINE

## 2024-03-21 PROCEDURE — 83690 ASSAY OF LIPASE: CPT

## 2024-03-21 PROCEDURE — 82803 BLOOD GASES ANY COMBINATION: CPT

## 2024-03-21 PROCEDURE — 2580000003 HC RX 258: Performed by: INTERNAL MEDICINE

## 2024-03-21 PROCEDURE — 96366 THER/PROPH/DIAG IV INF ADDON: CPT

## 2024-03-21 PROCEDURE — 6360000002 HC RX W HCPCS: Performed by: STUDENT IN AN ORGANIZED HEALTH CARE EDUCATION/TRAINING PROGRAM

## 2024-03-21 RX ORDER — ONDANSETRON 2 MG/ML
4 INJECTION INTRAMUSCULAR; INTRAVENOUS
Status: COMPLETED | OUTPATIENT
Start: 2024-03-21 | End: 2024-03-21

## 2024-03-21 RX ORDER — LIOTHYRONINE SODIUM 10 UG/ML
10 INJECTION, SOLUTION INTRAVENOUS ONCE
Status: COMPLETED | OUTPATIENT
Start: 2024-03-21 | End: 2024-03-21

## 2024-03-21 RX ORDER — LEVOTHYROXINE SODIUM 0.2 MG/1
200 TABLET ORAL DAILY
Status: ON HOLD | COMMUNITY
Start: 2018-02-05 | End: 2024-03-25 | Stop reason: HOSPADM

## 2024-03-21 RX ORDER — LIOTHYRONINE SODIUM 10 UG/ML
5 INJECTION, SOLUTION INTRAVENOUS EVERY 8 HOURS
Status: DISCONTINUED | OUTPATIENT
Start: 2024-03-22 | End: 2024-03-21

## 2024-03-21 RX ORDER — FENTANYL CITRATE-0.9 % NACL/PF 10 MCG/ML
25-200 PLASTIC BAG, INJECTION (ML) INTRAVENOUS CONTINUOUS
Status: DISCONTINUED | OUTPATIENT
Start: 2024-03-21 | End: 2024-03-22

## 2024-03-21 RX ORDER — ENOXAPARIN SODIUM 100 MG/ML
30 INJECTION SUBCUTANEOUS 2 TIMES DAILY
Status: DISCONTINUED | OUTPATIENT
Start: 2024-03-21 | End: 2024-03-25 | Stop reason: HOSPADM

## 2024-03-21 RX ORDER — 0.9 % SODIUM CHLORIDE 0.9 %
1000 INTRAVENOUS SOLUTION INTRAVENOUS ONCE
Status: COMPLETED | OUTPATIENT
Start: 2024-03-21 | End: 2024-03-21

## 2024-03-21 RX ORDER — MIDAZOLAM IN NACL,ISO-OSMOT/PF 50 MG/50ML
1-10 INFUSION BOTTLE (ML) INTRAVENOUS CONTINUOUS
Status: DISCONTINUED | OUTPATIENT
Start: 2024-03-21 | End: 2024-03-21

## 2024-03-21 RX ORDER — DEXMEDETOMIDINE HYDROCHLORIDE 4 UG/ML
.1-1.5 INJECTION, SOLUTION INTRAVENOUS CONTINUOUS
Status: DISCONTINUED | OUTPATIENT
Start: 2024-03-21 | End: 2024-03-22

## 2024-03-21 RX ORDER — ROCURONIUM BROMIDE 10 MG/ML
INJECTION, SOLUTION INTRAVENOUS
Status: DISPENSED
Start: 2024-03-21 | End: 2024-03-21

## 2024-03-21 RX ORDER — LIOTHYRONINE SODIUM 5 UG/1
5 TABLET ORAL EVERY 8 HOURS
Status: DISCONTINUED | OUTPATIENT
Start: 2024-03-22 | End: 2024-03-23

## 2024-03-21 RX ORDER — FUROSEMIDE 10 MG/ML
20 INJECTION INTRAMUSCULAR; INTRAVENOUS ONCE
Status: COMPLETED | OUTPATIENT
Start: 2024-03-21 | End: 2024-03-21

## 2024-03-21 RX ORDER — LEVOTHYROXINE SODIUM ANHYDROUS 100 UG/5ML
400 INJECTION, POWDER, LYOPHILIZED, FOR SOLUTION INTRAVENOUS ONCE
Status: DISCONTINUED | OUTPATIENT
Start: 2024-03-21 | End: 2024-03-21

## 2024-03-21 RX ORDER — FUROSEMIDE 20 MG/1
20 TABLET ORAL DAILY
Status: ON HOLD | COMMUNITY
Start: 2022-11-16 | End: 2024-03-25 | Stop reason: HOSPADM

## 2024-03-21 RX ADMIN — LEVOTHYROXINE SODIUM ANHYDROUS 400 MCG: 200 INJECTION, POWDER, LYOPHILIZED, FOR SOLUTION INTRAVENOUS at 17:43

## 2024-03-21 RX ADMIN — VANCOMYCIN HYDROCHLORIDE 2500 MG: 1 INJECTION, POWDER, LYOPHILIZED, FOR SOLUTION INTRAVENOUS at 04:08

## 2024-03-21 RX ADMIN — FUROSEMIDE 20 MG: 10 INJECTION, SOLUTION INTRAMUSCULAR; INTRAVENOUS at 14:12

## 2024-03-21 RX ADMIN — PIPERACILLIN AND TAZOBACTAM 4500 MG: 4; .5 INJECTION, POWDER, FOR SOLUTION INTRAVENOUS at 21:40

## 2024-03-21 RX ADMIN — PIPERACILLIN AND TAZOBACTAM 4500 MG: 4; .5 INJECTION, POWDER, FOR SOLUTION INTRAVENOUS at 14:55

## 2024-03-21 RX ADMIN — VANCOMYCIN HYDROCHLORIDE 1500 MG: 1 INJECTION, POWDER, LYOPHILIZED, FOR SOLUTION INTRAVENOUS at 17:43

## 2024-03-21 RX ADMIN — DEXMEDETOMIDINE HYDROCHLORIDE 0.2 MCG/KG/HR: 4 INJECTION, SOLUTION INTRAVENOUS at 13:26

## 2024-03-21 RX ADMIN — FAMOTIDINE 20 MG: 10 INJECTION, SOLUTION INTRAVENOUS at 21:40

## 2024-03-21 RX ADMIN — Medication 2 MG/HR: at 10:13

## 2024-03-21 RX ADMIN — HYDROCORTISONE SODIUM SUCCINATE 100 MG: 100 INJECTION, POWDER, FOR SOLUTION INTRAMUSCULAR; INTRAVENOUS at 21:40

## 2024-03-21 RX ADMIN — PIPERACILLIN AND TAZOBACTAM 4500 MG: 4; .5 INJECTION, POWDER, FOR SOLUTION INTRAVENOUS at 02:58

## 2024-03-21 RX ADMIN — FAMOTIDINE 20 MG: 10 INJECTION INTRAVENOUS at 02:59

## 2024-03-21 RX ADMIN — HYDROCORTISONE SODIUM SUCCINATE 100 MG: 100 INJECTION, POWDER, FOR SOLUTION INTRAMUSCULAR; INTRAVENOUS at 14:56

## 2024-03-21 RX ADMIN — LIOTHYRONINE SODIUM 10 MCG: 10 INJECTION, SOLUTION INTRAVENOUS at 17:42

## 2024-03-21 RX ADMIN — WATER 1000 MG: 1 INJECTION INTRAMUSCULAR; INTRAVENOUS; SUBCUTANEOUS at 09:17

## 2024-03-21 RX ADMIN — FENTANYL CITRATE 25 MCG/HR: at 14:11

## 2024-03-21 RX ADMIN — SODIUM CHLORIDE 1000 ML: 9 INJECTION, SOLUTION INTRAVENOUS at 02:58

## 2024-03-21 RX ADMIN — Medication 3 MG/HR: at 12:19

## 2024-03-21 RX ADMIN — ENOXAPARIN SODIUM 30 MG: 100 INJECTION SUBCUTANEOUS at 21:40

## 2024-03-21 RX ADMIN — IOPAMIDOL 80 ML: 612 INJECTION, SOLUTION INTRAVENOUS at 03:55

## 2024-03-21 RX ADMIN — ONDANSETRON 4 MG: 2 INJECTION INTRAMUSCULAR; INTRAVENOUS at 02:59

## 2024-03-21 RX ADMIN — ENOXAPARIN SODIUM 30 MG: 100 INJECTION SUBCUTANEOUS at 14:56

## 2024-03-21 ASSESSMENT — PULMONARY FUNCTION TESTS
PIF_VALUE: 28
PIF_VALUE: 29
PIF_VALUE: 31

## 2024-03-21 ASSESSMENT — PAIN SCALES - GENERAL
PAINLEVEL_OUTOF10: 7
PAINLEVEL_OUTOF10: 0
PAINLEVEL_OUTOF10: 0

## 2024-03-21 ASSESSMENT — PAIN DESCRIPTION - DESCRIPTORS: DESCRIPTORS: ACHING

## 2024-03-21 ASSESSMENT — PAIN DESCRIPTION - LOCATION: LOCATION: ABDOMEN

## 2024-03-21 ASSESSMENT — PAIN DESCRIPTION - ORIENTATION: ORIENTATION: MID

## 2024-03-21 ASSESSMENT — PAIN - FUNCTIONAL ASSESSMENT: PAIN_FUNCTIONAL_ASSESSMENT: 0-10

## 2024-03-21 NOTE — CARE COORDINATION
3/21/24    Mother wants HH if needed at discharge. CM will send star rated FOC list to mom @ [email or web address]       03/21/24 1682   Service Assessment   Patient Orientation Other (see comment);Sedated  (vent)   Cognition Other (see comment)  (vent/sedated)   Primary Caregiver Family   Support Systems Family Members   Patient's Healthcare Decision Maker is: Legal Next of Kin   PCP Verified by CM Yes  (Danya Partida)   Last Visit to PCP Within last 6 months   Prior Functional Level Assistance with the following:;Bathing;Dressing;Toileting;Cooking;Housework;Shopping;Mobility   Current Functional Level Mobility;Shopping;Housework;Cooking;Toileting;Dressing;Feeding;Bathing;Assistance with the following:   Can patient return to prior living arrangement Unknown at present   Ability to make needs known: Fair   Family able to assist with home care needs: Yes   Would you like for me to discuss the discharge plan with any other family members/significant others, and if so, who? Yes  (mom Bettie Gusman)   Financial Resources Medicaid  (pending)   Community Resources None   CM/SW Referral Other (see comment)  (na)   Social/Functional History   Lives With Family   Type of Home Apartment   Home Layout Two level   Home Access Stairs to enter with rails   Entrance Stairs - Number of Steps 4   Entrance Stairs - Rails Both   Bathroom Shower/Tub Tub/Shower unit   Bathroom Toilet Standard   Bathroom Equipment None   Bathroom Accessibility Not accessible   Home Equipment None   Receives Help From Family   ADL Assistance Needs assistance   Bath Maximum assistance   Dressing Maximum assistance   Grooming Maximum assistance   Feeding Independent   Toileting Needs assistance   Homemaking Assistance Needs assistance   Meal Prep Total   Laundry Total   Vacuuming Unable to assess (comment)   Cleaning Unable to assess (comment)   Gardening Unable to assess (comment)   Yard Work Unable to assess (comment)   Driving Unable to assess

## 2024-03-21 NOTE — ED NOTES
0935 Decision to intubate made by Dr. Smith. RT at bedside.   0943 Etomidate 40mg given  0944 Ilan 130mg given  0946 ETT 7.5 inserted by Dr. Smith, 22cm at teeth.   0955 OGT Fr.18 inserted by CASA Gamble; 57cm at lip level  1005 Fr.16 plaza inserted aseptically, clear urine return noted. Secured at R inner thigh.      
1035 taken to CT by this RN together with Reynaldo, RT.  1050 Back from CT. Placed back on monitor. Suctioned oral secretions PRN. OGT connected to intermittent suction.  
ABG ordered by provider.  RT notified.    Pt remains on BIPAP.  
Assumed care of pt arousable with repeated stimuli, opens eyes no verbal response. Pt on Bipap for decreased SPO2. Pt 100% on Bipap NAD noted at this time, pt on cardiac monitor. Pt head positioned with towel to help with placement.   
O2 sat was 88% on RA as she is sleeping. Pt was placed on 2L via NC. O2 sat increased to 97%.  
Patient became dyspneic , patient's o2 saturation dropped to 80's. Patient readjusted in bed at this time. Dr. Holt made aware.   
Patient has snoring respirations at this time.   
Patient is snoring loudly. Mouth breathing. RT aware.   
Patient repositioned. BP cuff moved to right forearm. SpO2 monitor replaced. Vancomycin running in left AC. RT notified. BIPAP requested.   
Patient unable to ambulate at this time. Patient hard to arouse.   
Pt back from CT  
Pt to CT.  
Received verbal permission from the Pt to speak with her mother, Bettie, about her care. Mother reports Pt has been noncompliant with her antihypertensives and hypothyroid medication for years d/t depression. Notified MD Jonathon. Awaiting orders.  
Report given to John CORMIER. All questions answered.   
Report received from CASA Yates.    Pt is asleep on stretcher, needed repeated stimuli to wake pt. Will only eyes but unable to answer.    Pt on BIPAP, O2 sat at 100%.    Side rails raised x2. Call bell within reach.    Informed CT that pt still on BIPAP at this time.  
Respiratory placed patient on cpap at this time.   
Small baggy with crystal like substance inside - possibly crystal meth. Provider Dr. Smith notified. Drugs given to security.   
TRANSFER - OUT REPORT:    Verbal report given to CASA Galdamez on Aditya Gusman  being transferred to ICU for routine progression of patient care       Report consisted of patient's Situation, Background, Assessment and   Recommendations(SBAR).     Information from the following report(s) ED Encounter Summary, ED SBAR, MAR, and Recent Results was reviewed with the receiving nurse.    [place] Fall Assessment:    Presents to emergency department  because of falls (Syncope, seizure, or loss of consciousness): No  Age > 70: No  Altered Mental Status, Intoxication with alcohol or substance confusion (Disorientation, impaired judgment, poor safety awaremess, or inability to follow instructions): Yes  Impaired Mobility: Ambulates or transfers with assistive devices or assistance; Unable to ambulate or transer.: Yes  Nursing Judgement: Yes          Lines:   Peripheral IV 03/21/24 Left Antecubital (Active)       Peripheral IV 03/21/24 Right Antecubital (Active)        Opportunity for questions and clarification was provided.      Patient transported with:  Registered Nurse wilber RT.       
hemorrhage.      2.  S/P NGT or OGT placement.      CT ABDOMEN PELVIS W IV CONTRAST Additional Contrast? None   Final Result      No clearly acute findings.      Perhaps hepatic steatosis bibasilar lung likely dependent atelectasis, but   inflammatory component not excluded. Patchy superficial body wall edema. See   additional details above..      XR CHEST PORTABLE   Final Result      Perceived opacities of the right greater than left medial aspects of the lungs.   This could be secondary to atelectasis from mildly hypoinflated state however   developing infiltrates, lymphadenopathy or edema is not excluded.       Vascular duplex lower extremity venous bilateral    (Results Pending)     Intubation Procedure Note    Indication: Respiratory failure    Consent: Unable to be obtained due to the emergent nature of this procedure.    Medications Used: etomidate intravenously and rocuronium intravenously    Procedure: The patient was placed in the appropriate position.  Cricoid pressure was utilized.  Intubation was performed by direct laryngoscopy using a laryngoscope and a 7.5 cuffed endotracheal tube.  The cuff was then inflated and the tube was secured appropriately at a distance of 23 cm to the dental ridge.  Initial confirmation of placement included bilateral breath sounds, an end tidal CO2 detector, absence of sounds over the stomach, tube fogging, adequate chest rise, and adequate pulse oximetry reading.  A chest x-ray to verify correct placement of the tube showed appropriate tube position.    The patient tolerated the procedure well.     Complications: None      A/P: The patient's ABG showed a pH of 7.01 and a CO2 greater than 110 while on BiPAP.  I emergently intubated the patient.  She is now on Versed for sedation.  I did do a CT of her head and chest.  She did have a CT of her abdomen earlier in the evening.  CT head negative.  Ct chest shows multifocal PNA.      We also found what law enforcement believes

## 2024-03-21 NOTE — ED PROVIDER NOTES
EMERGENCY DEPARTMENT HISTORY AND PHYSICAL EXAM    Date: 3/21/2024  Patient Name: Aditya Gusman    History of Presenting Illness     Chief Complaint   Patient presents with    Abdominal Pain     Abd pain onset today         History Provided By: patient     Chief Complaint: abd pain   Duration: 1 day   Timing:  acute   Location: Abdomen  Quality: Sharp  Severity: moderate  Modifying Factors: None  Associated Symptoms: Nausea      Additional History (Context): Aditya Gusman is a 33 y.o. female with PMH depression htn epilepsy thyroid disease and methamphetamine abuse who presents with complaints of 1 day of generalized abdominal pain and nausea.  Patient is falling asleep during the exam, frequently requiring verbal and painful stimuli to arouse.  She does not report any substance use tonight.  No known sick exposures.  No other history obtained at this time.    PCP: Clayton King MD    Current Facility-Administered Medications   Medication Dose Route Frequency Provider Last Rate Last Admin    vancomycin (VANCOCIN) 1,000 mg in sodium chloride 0.9 % 250 mL (vial-mate) IVPB  1,000 mg IntraVENous NOW Allyn Payne PA-C        piperacillin-tazobactam (ZOSYN) 4,500 mg in sodium chloride 0.9 % 100 mL IVPB (mini-bag)  4,500 mg IntraVENous NOW Allyn Payne PA-C        sodium chloride 0.9 % bolus 1,000 mL  1,000 mL IntraVENous Once Allyn Payne PA-C        ondansetron (ZOFRAN) injection 4 mg  4 mg IntraVENous NOW Allyn Payne PA-C        famotidine (PEPCID) 20 mg in sodium chloride (PF) 0.9 % 10 mL injection  20 mg IntraVENous NOW Allyn Payne PA-C         Current Outpatient Medications   Medication Sig Dispense Refill    acetaminophen (TYLENOL) 500 MG tablet Take 1,000 mg by mouth every 6 hours as needed      cloNIDine (CATAPRES) 0.1 MG tablet Take 0.1 mg by mouth 2 times daily      ibuprofen (ADVIL;MOTRIN) 600 MG tablet Take 600 mg by mouth every 6 hours as needed      norethindrone

## 2024-03-21 NOTE — PLAN OF CARE
Problem: Discharge Planning  Goal: Discharge to home or other facility with appropriate resources  Outcome: Progressing  Flowsheets (Taken 3/21/2024 1400)  Discharge to home or other facility with appropriate resources:   Identify barriers to discharge with patient and caregiver   Arrange for interpreters to assist at discharge as needed     Problem: Pain  Goal: Verbalizes/displays adequate comfort level or baseline comfort level  Outcome: Progressing     Problem: Safety - Medical Restraint  Goal: Remains free of injury from restraints (Restraint for Interference with Medical Device)  Description: INTERVENTIONS:  1. Determine that other, less restrictive measures have been tried or would not be effective before applying the restraint  2. Evaluate the patient's condition at the time of restraint application  3. Inform patient/family regarding the reason for restraint  4. Q2H: Monitor safety, psychosocial status, comfort, nutrition and hydration  Outcome: Progressing  Flowsheets (Taken 3/21/2024 1423)  Remains free of injury from restraints (restraint for interference with medical device): Determine that other, less restrictive measures have been tried or would not be effective before applying the restraint

## 2024-03-21 NOTE — DISCHARGE INSTRUCTIONS
Follow with PCP have her make a sleep study appointment for you.      DISCHARGE SUMMARY from Nurse    PATIENT INSTRUCTIONS:    What to do at Home:  Recommended activity: activity as tolerated, including with assistance from home health.    If you experience any of the following symptoms: increased swelling, chest pain, shortness of breath, syncope, or a change in mentation, please follow up with your physician or report to the emergency department.    *  Please give a list of your current medications to your Primary Care Provider.    *  Please update this list whenever your medications are discontinued, doses are      changed, or new medications (including over-the-counter products) are added.    *  Please carry medication information at all times in case of emergency situations.    These are general instructions for a healthy lifestyle:    No smoking/ No tobacco products/ Avoid exposure to second hand smoke  Surgeon General's Warning:  Quitting smoking now greatly reduces serious risk to your health.    Obesity, smoking, and sedentary lifestyle greatly increases your risk for illness    A healthy diet, regular physical exercise & weight monitoring are important for maintaining a healthy lifestyle    You may be retaining fluid if you have a history of heart failure or if you experience any of the following symptoms:  Weight gain of 3 pounds or more overnight or 5 pounds in a week, increased swelling in our hands or feet or shortness of breath while lying flat in bed.  Please call your doctor as soon as you notice any of these symptoms; do not wait until your next office visit.        The discharge information has been reviewed with the patient.  The patient verbalized understanding.  Discharge medications reviewed with the patient and appropriate educational materials and side effects teaching were provided.    Patient armband removed and shredded.

## 2024-03-21 NOTE — H&P
Influenza B in nasopharyngeal for use under the FDA's Emergency Use Authorization(EAU) only.     Fact sheet for Patients: https://www.fda.gov/media/320063/download  Fact sheet for Healthcare Providers: https://www.fda.fov/media/161330/download                   Telemetry:normal sinus rhythm    Imaging:  I have personally reviewed the patient’s radiographs and have reviewed the reports:    Recent image results  XR ABDOMEN (KUB) (SINGLE AP VIEW)    Result Date: 3/21/2024   Gastric tube course below the diaphragm with tip and sideport overlying the expected location of the stomach.     CT CHEST WO CONTRAST    Result Date: 3/21/2024           1.  Bilateral consolidative densities, suggestive of multilobe pneumonia vs. pulmonary hemorrhage. 2.  S/P NGT or OGT placement.    CT Head W/O Contrast    Result Date: 3/21/2024  No acute intracranial abnormalities.    XR CHEST PORTABLE    Result Date: 3/21/2024  Perceived opacities of the right greater than left medial aspects of the lungs. This could be secondary to atelectasis from mildly hypoinflated state however developing infiltrates, lymphadenopathy or edema is not excluded.     CT ABDOMEN PELVIS W IV CONTRAST Additional Contrast? None    Result Date: 3/21/2024  No clearly acute findings. Perhaps hepatic steatosis bibasilar lung likely dependent atelectasis, but inflammatory component not excluded. Patchy superficial body wall edema. See additional details above..      No results found for this or any previous visit.     Ultrasound Result (most recent):  US TRANSABDOMINAL LESS THAN 14 WEEKS TRANSVAGINAL     Narrative  ++++++++++++++++++++++++++++++++++++++++++++++++++++++++++++++++++  +++++CLASS CODE+++++:  P-positive findings +++++ POSITIVE +++++  ++++++++++++++++++++++++++++++++++++++++++++++++++++++++++++++++++  [id number]             4/2/2018 3:17:16 PM EDT    RESULT  EXAM DESCRIPTION:  US OB 1ST TRIMESTER TRANSVAGINAL    COMPLETED DATE/TIME:  4/2/2018 3:17

## 2024-03-22 ENCOUNTER — APPOINTMENT (OUTPATIENT)
Facility: HOSPITAL | Age: 34
DRG: 208 | End: 2024-03-22
Attending: STUDENT IN AN ORGANIZED HEALTH CARE EDUCATION/TRAINING PROGRAM
Payer: MEDICAID

## 2024-03-22 ENCOUNTER — APPOINTMENT (OUTPATIENT)
Facility: HOSPITAL | Age: 34
DRG: 208 | End: 2024-03-22
Payer: MEDICAID

## 2024-03-22 LAB
ANION GAP SERPL CALC-SCNC: 10 MMOL/L (ref 3–18)
ANION GAP SERPL CALC-SCNC: 7 MMOL/L (ref 3–18)
ARTERIAL PATENCY WRIST A: POSITIVE
ARTERIAL PATENCY WRIST A: POSITIVE
BACTERIA SPEC CULT: NORMAL
BASE EXCESS BLD CALC-SCNC: 2.3 MMOL/L
BASE EXCESS BLD CALC-SCNC: 7.6 MMOL/L
BASOPHILS # BLD: 0 K/UL (ref 0–0.1)
BASOPHILS NFR BLD: 0 % (ref 0–2)
BDY SITE: ABNORMAL
BDY SITE: ABNORMAL
BUN SERPL-MCNC: 11 MG/DL (ref 7–18)
BUN SERPL-MCNC: 15 MG/DL (ref 7–18)
BUN/CREAT SERPL: 10 (ref 12–20)
BUN/CREAT SERPL: 11 (ref 12–20)
CALCIUM SERPL-MCNC: 8.4 MG/DL (ref 8.5–10.1)
CALCIUM SERPL-MCNC: 9 MG/DL (ref 8.5–10.1)
CHLORIDE SERPL-SCNC: 105 MMOL/L (ref 100–111)
CHLORIDE SERPL-SCNC: 105 MMOL/L (ref 100–111)
CO2 SERPL-SCNC: 27 MMOL/L (ref 21–32)
CO2 SERPL-SCNC: 29 MMOL/L (ref 21–32)
CORTIS SERPL-MCNC: 20.5 UG/DL
CREAT SERPL-MCNC: 1.11 MG/DL (ref 0.6–1.3)
CREAT SERPL-MCNC: 1.41 MG/DL (ref 0.6–1.3)
DIFFERENTIAL METHOD BLD: ABNORMAL
ECHO AO ASC DIAM: 2.9 CM
ECHO AO ASCENDING AORTA INDEX: 1.22 CM/M2
ECHO AO ROOT DIAM: 3.3 CM
ECHO AO ROOT INDEX: 1.39 CM/M2
ECHO AV PEAK GRADIENT: 11 MMHG
ECHO AV PEAK VELOCITY: 1.7 M/S
ECHO AV VELOCITY RATIO: 0.82
ECHO BSA: 2.56 M2
ECHO BSA: 2.56 M2
ECHO EST RA PRESSURE: 15 MMHG
ECHO LA DIAMETER INDEX: 1.65 CM/M2
ECHO LA DIAMETER: 3.9 CM
ECHO LA TO AORTIC ROOT RATIO: 1.18
ECHO LA VOL A-L A2C: 48 ML (ref 22–52)
ECHO LA VOL A-L A4C: 46 ML (ref 22–52)
ECHO LA VOL BP: 46 ML (ref 22–52)
ECHO LA VOL MOD A2C: 46 ML (ref 22–52)
ECHO LA VOL MOD A4C: 44 ML (ref 22–52)
ECHO LA VOL/BSA BIPLANE: 19 ML/M2 (ref 16–34)
ECHO LA VOLUME AREA LENGTH: 49 ML
ECHO LA VOLUME INDEX A-L A2C: 20 ML/M2 (ref 16–34)
ECHO LA VOLUME INDEX A-L A4C: 19 ML/M2 (ref 16–34)
ECHO LA VOLUME INDEX AREA LENGTH: 21 ML/M2 (ref 16–34)
ECHO LA VOLUME INDEX MOD A2C: 19 ML/M2 (ref 16–34)
ECHO LA VOLUME INDEX MOD A4C: 19 ML/M2 (ref 16–34)
ECHO LV E' LATERAL VELOCITY: 7 CM/S
ECHO LV E' SEPTAL VELOCITY: 7 CM/S
ECHO LV FRACTIONAL SHORTENING: 44 % (ref 28–44)
ECHO LV INTERNAL DIMENSION DIASTOLE INDEX: 2.19 CM/M2
ECHO LV INTERNAL DIMENSION DIASTOLIC: 5.2 CM (ref 3.9–5.3)
ECHO LV INTERNAL DIMENSION SYSTOLIC INDEX: 1.22 CM/M2
ECHO LV INTERNAL DIMENSION SYSTOLIC: 2.9 CM
ECHO LV IVSD: 1.1 CM (ref 0.6–0.9)
ECHO LV MASS 2D: 220.8 G (ref 67–162)
ECHO LV MASS INDEX 2D: 93.1 G/M2 (ref 43–95)
ECHO LV POSTERIOR WALL DIASTOLIC: 1.1 CM (ref 0.6–0.9)
ECHO LV RELATIVE WALL THICKNESS RATIO: 0.42
ECHO LVOT PEAK GRADIENT: 8 MMHG
ECHO LVOT PEAK VELOCITY: 1.4 M/S
ECHO MV A VELOCITY: 0.76 M/S
ECHO MV E DECELERATION TIME (DT): 233.1 MS
ECHO MV E VELOCITY: 1.04 M/S
ECHO MV E/A RATIO: 1.37
ECHO MV E/E' LATERAL: 14.86
ECHO MV E/E' RATIO (AVERAGED): 14.86
ECHO PV MAX VELOCITY: 0.9 M/S
ECHO PV PEAK GRADIENT: 3 MMHG
ECHO RIGHT VENTRICULAR SYSTOLIC PRESSURE (RVSP): 59 MMHG
ECHO TV REGURGITANT MAX VELOCITY: 3.33 M/S
ECHO TV REGURGITANT PEAK GRADIENT: 44 MMHG
EKG ATRIAL RATE: 75 BPM
EKG DIAGNOSIS: NORMAL
EKG P AXIS: 54 DEGREES
EKG P-R INTERVAL: 214 MS
EKG Q-T INTERVAL: 374 MS
EKG QRS DURATION: 86 MS
EKG QTC CALCULATION (BAZETT): 417 MS
EKG R AXIS: 120 DEGREES
EKG T AXIS: 63 DEGREES
EKG VENTRICULAR RATE: 75 BPM
EOSINOPHIL # BLD: 0 K/UL (ref 0–0.4)
EOSINOPHIL NFR BLD: 0 % (ref 0–5)
ERYTHROCYTE [DISTWIDTH] IN BLOOD BY AUTOMATED COUNT: 23.3 % (ref 11.6–14.5)
EST. AVERAGE GLUCOSE BLD GHB EST-MCNC: 126 MG/DL
FENTANYL URINE: NEGATIVE PG/ML
GAS FLOW.O2 O2 DELIVERY SYS: ABNORMAL
GAS FLOW.O2 O2 DELIVERY SYS: ABNORMAL
GAS FLOW.O2 SETTING OXYMISER: 24 BPM
GLUCOSE BLD STRIP.AUTO-MCNC: 127 MG/DL (ref 70–110)
GLUCOSE BLD STRIP.AUTO-MCNC: 130 MG/DL (ref 70–110)
GLUCOSE BLD STRIP.AUTO-MCNC: 140 MG/DL (ref 70–110)
GLUCOSE BLD STRIP.AUTO-MCNC: 151 MG/DL (ref 70–110)
GLUCOSE SERPL-MCNC: 121 MG/DL (ref 74–99)
GLUCOSE SERPL-MCNC: 172 MG/DL (ref 74–99)
HBA1C MFR BLD: 6 % (ref 4.2–5.6)
HCO3 BLD-SCNC: 25.7 MMOL/L (ref 22–26)
HCO3 BLD-SCNC: 29.3 MMOL/L (ref 22–26)
HCT VFR BLD AUTO: 24.7 % (ref 35–45)
HCT VFR BLD AUTO: 27.8 % (ref 35–45)
HGB BLD-MCNC: 6.9 G/DL (ref 12–16)
HGB BLD-MCNC: 7.8 G/DL (ref 12–16)
HISTORY CHECK: NORMAL
IMM GRANULOCYTES # BLD AUTO: 0.2 K/UL (ref 0–0.04)
IMM GRANULOCYTES NFR BLD AUTO: 1 % (ref 0–0.5)
IRON SATN MFR SERPL: 6 % (ref 20–50)
IRON SERPL-MCNC: 29 UG/DL (ref 50–175)
LACTATE BLD-SCNC: 0.68 MMOL/L (ref 0.4–2)
LYMPHOCYTES # BLD: 1.4 K/UL (ref 0.9–3.6)
LYMPHOCYTES NFR BLD: 10 % (ref 21–52)
MAGNESIUM SERPL-MCNC: 2.3 MG/DL (ref 1.6–2.6)
MCH RBC QN AUTO: 20 PG (ref 24–34)
MCHC RBC AUTO-ENTMCNC: 27.9 G/DL (ref 31–37)
MCV RBC AUTO: 71.6 FL (ref 78–100)
MONOCYTES # BLD: 0.4 K/UL (ref 0.05–1.2)
MONOCYTES NFR BLD: 3 % (ref 3–10)
NEUTS SEG # BLD: 12.3 K/UL (ref 1.8–8)
NEUTS SEG NFR BLD: 86 % (ref 40–73)
NRBC # BLD: 0.02 K/UL (ref 0–0.01)
NRBC BLD-RTO: 0.1 PER 100 WBC
O2/TOTAL GAS SETTING VFR VENT: 30 %
O2/TOTAL GAS SETTING VFR VENT: 35 %
PCO2 BLD: 29.2 MMHG (ref 35–45)
PCO2 BLD: 34 MMHG (ref 35–45)
PEEP RESPIRATORY: 5 CMH2O
PEEP RESPIRATORY: 5 CMH2O
PH BLD: 7.49 (ref 7.35–7.45)
PH BLD: 7.61 (ref 7.35–7.45)
PHOSPHATE SERPL-MCNC: 3.7 MG/DL (ref 2.5–4.9)
PLATELET # BLD AUTO: 367 K/UL (ref 135–420)
PMV BLD AUTO: 11.1 FL (ref 9.2–11.8)
PO2 BLD: 104 MMHG (ref 80–100)
PO2 BLD: 66 MMHG (ref 80–100)
POTASSIUM SERPL-SCNC: 3.2 MMOL/L (ref 3.5–5.5)
POTASSIUM SERPL-SCNC: 3.8 MMOL/L (ref 3.5–5.5)
PRESSURE SUPPORT SETTING VENT: 7 CMH2O
RBC # BLD AUTO: 3.45 M/UL (ref 4.2–5.3)
RESPIRATORY RATE, POC: 23 (ref 5–40)
RETICS/RBC NFR AUTO: 2.3 % (ref 0.5–2.5)
SAO2 % BLD: 96.1 % (ref 92–97)
SAO2 % BLD: 98.5 % (ref 92–97)
SERVICE CMNT-IMP: ABNORMAL
SERVICE CMNT-IMP: NORMAL
SERVICE CMNT-IMP: NORMAL
SODIUM SERPL-SCNC: 141 MMOL/L (ref 136–145)
SODIUM SERPL-SCNC: 142 MMOL/L (ref 136–145)
SPECIMEN TYPE: ABNORMAL
SPECIMEN TYPE: ABNORMAL
T3FREE SERPL-MCNC: 1.2 PG/ML (ref 2.18–3.98)
TIBC SERPL-MCNC: 453 UG/DL (ref 250–450)
TSH SERPL DL<=0.05 MIU/L-ACNC: 42.4 UIU/ML (ref 0.36–3.74)
VANCOMYCIN SERPL-MCNC: 20.2 UG/ML (ref 5–40)
VENTILATION MODE VENT: ABNORMAL
VENTILATION MODE VENT: ABNORMAL
VT SETTING VENT: 460 ML
WBC # BLD AUTO: 14.3 K/UL (ref 4.6–13.2)

## 2024-03-22 PROCEDURE — 86901 BLOOD TYPING SEROLOGIC RH(D): CPT

## 2024-03-22 PROCEDURE — 36600 WITHDRAWAL OF ARTERIAL BLOOD: CPT

## 2024-03-22 PROCEDURE — 84443 ASSAY THYROID STIM HORMONE: CPT

## 2024-03-22 PROCEDURE — 6360000002 HC RX W HCPCS: Performed by: INTERNAL MEDICINE

## 2024-03-22 PROCEDURE — 6360000002 HC RX W HCPCS: Performed by: NURSE PRACTITIONER

## 2024-03-22 PROCEDURE — 2000000000 HC ICU R&B

## 2024-03-22 PROCEDURE — P9040 RBC LEUKOREDUCED IRRADIATED: HCPCS

## 2024-03-22 PROCEDURE — 86850 RBC ANTIBODY SCREEN: CPT

## 2024-03-22 PROCEDURE — 83735 ASSAY OF MAGNESIUM: CPT

## 2024-03-22 PROCEDURE — 80202 ASSAY OF VANCOMYCIN: CPT

## 2024-03-22 PROCEDURE — 93010 ELECTROCARDIOGRAM REPORT: CPT | Performed by: INTERNAL MEDICINE

## 2024-03-22 PROCEDURE — C8929 TTE W OR WO FOL WCON,DOPPLER: HCPCS

## 2024-03-22 PROCEDURE — 36410 VNPNXR 3YR/> PHY/QHP DX/THER: CPT

## 2024-03-22 PROCEDURE — 94660 CPAP INITIATION&MGMT: CPT

## 2024-03-22 PROCEDURE — 30233N1 TRANSFUSION OF NONAUTOLOGOUS RED BLOOD CELLS INTO PERIPHERAL VEIN, PERCUTANEOUS APPROACH: ICD-10-PCS | Performed by: INTERNAL MEDICINE

## 2024-03-22 PROCEDURE — 71045 X-RAY EXAM CHEST 1 VIEW: CPT

## 2024-03-22 PROCEDURE — P9016 RBC LEUKOCYTES REDUCED: HCPCS

## 2024-03-22 PROCEDURE — 83605 ASSAY OF LACTIC ACID: CPT

## 2024-03-22 PROCEDURE — 36430 TRANSFUSION BLD/BLD COMPNT: CPT

## 2024-03-22 PROCEDURE — 83540 ASSAY OF IRON: CPT

## 2024-03-22 PROCEDURE — 94003 VENT MGMT INPAT SUBQ DAY: CPT

## 2024-03-22 PROCEDURE — 6360000002 HC RX W HCPCS: Performed by: STUDENT IN AN ORGANIZED HEALTH CARE EDUCATION/TRAINING PROGRAM

## 2024-03-22 PROCEDURE — 6370000000 HC RX 637 (ALT 250 FOR IP): Performed by: STUDENT IN AN ORGANIZED HEALTH CARE EDUCATION/TRAINING PROGRAM

## 2024-03-22 PROCEDURE — 84481 FREE ASSAY (FT-3): CPT

## 2024-03-22 PROCEDURE — 85018 HEMOGLOBIN: CPT

## 2024-03-22 PROCEDURE — 2700000000 HC OXYGEN THERAPY PER DAY

## 2024-03-22 PROCEDURE — 6370000000 HC RX 637 (ALT 250 FOR IP): Performed by: NURSE PRACTITIONER

## 2024-03-22 PROCEDURE — 82803 BLOOD GASES ANY COMBINATION: CPT

## 2024-03-22 PROCEDURE — 99291 CRITICAL CARE FIRST HOUR: CPT | Performed by: INTERNAL MEDICINE

## 2024-03-22 PROCEDURE — 94761 N-INVAS EAR/PLS OXIMETRY MLT: CPT

## 2024-03-22 PROCEDURE — 85045 AUTOMATED RETICULOCYTE COUNT: CPT

## 2024-03-22 PROCEDURE — 83036 HEMOGLOBIN GLYCOSYLATED A1C: CPT

## 2024-03-22 PROCEDURE — 93970 EXTREMITY STUDY: CPT | Performed by: SURGERY

## 2024-03-22 PROCEDURE — 93306 TTE W/DOPPLER COMPLETE: CPT | Performed by: INTERNAL MEDICINE

## 2024-03-22 PROCEDURE — 83550 IRON BINDING TEST: CPT

## 2024-03-22 PROCEDURE — 84100 ASSAY OF PHOSPHORUS: CPT

## 2024-03-22 PROCEDURE — 93970 EXTREMITY STUDY: CPT

## 2024-03-22 PROCEDURE — 85014 HEMATOCRIT: CPT

## 2024-03-22 PROCEDURE — A4216 STERILE WATER/SALINE, 10 ML: HCPCS | Performed by: STUDENT IN AN ORGANIZED HEALTH CARE EDUCATION/TRAINING PROGRAM

## 2024-03-22 PROCEDURE — 82728 ASSAY OF FERRITIN: CPT

## 2024-03-22 PROCEDURE — 2500000003 HC RX 250 WO HCPCS: Performed by: INTERNAL MEDICINE

## 2024-03-22 PROCEDURE — 6360000004 HC RX CONTRAST MEDICATION: Performed by: INTERNAL MEDICINE

## 2024-03-22 PROCEDURE — 80048 BASIC METABOLIC PNL TOTAL CA: CPT

## 2024-03-22 PROCEDURE — 2500000003 HC RX 250 WO HCPCS: Performed by: STUDENT IN AN ORGANIZED HEALTH CARE EDUCATION/TRAINING PROGRAM

## 2024-03-22 PROCEDURE — 85025 COMPLETE CBC W/AUTO DIFF WBC: CPT

## 2024-03-22 PROCEDURE — 2580000003 HC RX 258: Performed by: INTERNAL MEDICINE

## 2024-03-22 PROCEDURE — 86900 BLOOD TYPING SEROLOGIC ABO: CPT

## 2024-03-22 PROCEDURE — 86923 COMPATIBILITY TEST ELECTRIC: CPT

## 2024-03-22 PROCEDURE — 2580000003 HC RX 258: Performed by: STUDENT IN AN ORGANIZED HEALTH CARE EDUCATION/TRAINING PROGRAM

## 2024-03-22 PROCEDURE — 82962 GLUCOSE BLOOD TEST: CPT

## 2024-03-22 RX ORDER — SODIUM CHLORIDE 9 MG/ML
INJECTION, SOLUTION INTRAVENOUS PRN
Status: DISCONTINUED | OUTPATIENT
Start: 2024-03-22 | End: 2024-03-25

## 2024-03-22 RX ORDER — CALCIUM GLUCONATE 20 MG/ML
1000 INJECTION, SOLUTION INTRAVENOUS
Status: COMPLETED | OUTPATIENT
Start: 2024-03-22 | End: 2024-03-22

## 2024-03-22 RX ORDER — INSULIN LISPRO 100 [IU]/ML
0-4 INJECTION, SOLUTION INTRAVENOUS; SUBCUTANEOUS EVERY 6 HOURS
Status: DISCONTINUED | OUTPATIENT
Start: 2024-03-22 | End: 2024-03-23

## 2024-03-22 RX ORDER — DEXTROSE MONOHYDRATE 100 MG/ML
INJECTION, SOLUTION INTRAVENOUS CONTINUOUS PRN
Status: DISCONTINUED | OUTPATIENT
Start: 2024-03-22 | End: 2024-03-25 | Stop reason: HOSPADM

## 2024-03-22 RX ORDER — CHLORHEXIDINE GLUCONATE ORAL RINSE 1.2 MG/ML
15 SOLUTION DENTAL 2 TIMES DAILY
Status: DISCONTINUED | OUTPATIENT
Start: 2024-03-22 | End: 2024-03-22

## 2024-03-22 RX ORDER — POTASSIUM CHLORIDE 7.45 MG/ML
10 INJECTION INTRAVENOUS
Status: COMPLETED | OUTPATIENT
Start: 2024-03-22 | End: 2024-03-22

## 2024-03-22 RX ADMIN — PIPERACILLIN AND TAZOBACTAM 4500 MG: 4; .5 INJECTION, POWDER, FOR SOLUTION INTRAVENOUS at 22:13

## 2024-03-22 RX ADMIN — ENOXAPARIN SODIUM 30 MG: 100 INJECTION SUBCUTANEOUS at 08:55

## 2024-03-22 RX ADMIN — DEXMEDETOMIDINE HYDROCHLORIDE 0.2 MCG/KG/HR: 4 INJECTION, SOLUTION INTRAVENOUS at 00:10

## 2024-03-22 RX ADMIN — HYDROCORTISONE SODIUM SUCCINATE 100 MG: 100 INJECTION, POWDER, FOR SOLUTION INTRAMUSCULAR; INTRAVENOUS at 06:14

## 2024-03-22 RX ADMIN — POTASSIUM CHLORIDE 10 MEQ: 7.46 INJECTION, SOLUTION INTRAVENOUS at 12:30

## 2024-03-22 RX ADMIN — ENOXAPARIN SODIUM 30 MG: 100 INJECTION SUBCUTANEOUS at 20:58

## 2024-03-22 RX ADMIN — PIPERACILLIN AND TAZOBACTAM 4500 MG: 4; .5 INJECTION, POWDER, FOR SOLUTION INTRAVENOUS at 13:31

## 2024-03-22 RX ADMIN — POTASSIUM BICARBONATE 40 MEQ: 782 TABLET, EFFERVESCENT ORAL at 11:30

## 2024-03-22 RX ADMIN — CALCIUM GLUCONATE 1000 MG: 20 INJECTION, SOLUTION INTRAVENOUS at 19:59

## 2024-03-22 RX ADMIN — HYDROCORTISONE SODIUM SUCCINATE 100 MG: 100 INJECTION, POWDER, FOR SOLUTION INTRAMUSCULAR; INTRAVENOUS at 22:31

## 2024-03-22 RX ADMIN — LIOTHYRONINE SODIUM 5 MCG: 5 TABLET ORAL at 16:37

## 2024-03-22 RX ADMIN — LEVOTHYROXINE SODIUM ANHYDROUS 100 MCG: 100 INJECTION, POWDER, LYOPHILIZED, FOR SOLUTION INTRAVENOUS at 06:13

## 2024-03-22 RX ADMIN — PIPERACILLIN AND TAZOBACTAM 4500 MG: 4; .5 INJECTION, POWDER, FOR SOLUTION INTRAVENOUS at 08:57

## 2024-03-22 RX ADMIN — SODIUM CHLORIDE 125 MG: 9 INJECTION, SOLUTION INTRAVENOUS at 14:00

## 2024-03-22 RX ADMIN — FENTANYL CITRATE 100 MCG/HR: at 03:22

## 2024-03-22 RX ADMIN — LIOTHYRONINE SODIUM 5 MCG: 5 TABLET ORAL at 00:11

## 2024-03-22 RX ADMIN — VANCOMYCIN HYDROCHLORIDE 1500 MG: 1 INJECTION, POWDER, LYOPHILIZED, FOR SOLUTION INTRAVENOUS at 06:14

## 2024-03-22 RX ADMIN — DEXMEDETOMIDINE HYDROCHLORIDE 0.5 MCG/KG/HR: 4 INJECTION, SOLUTION INTRAVENOUS at 11:48

## 2024-03-22 RX ADMIN — HYDROCORTISONE SODIUM SUCCINATE 100 MG: 100 INJECTION, POWDER, FOR SOLUTION INTRAMUSCULAR; INTRAVENOUS at 13:31

## 2024-03-22 RX ADMIN — CALCIUM GLUCONATE 1000 MG: 20 INJECTION, SOLUTION INTRAVENOUS at 20:59

## 2024-03-22 RX ADMIN — FAMOTIDINE 20 MG: 10 INJECTION, SOLUTION INTRAVENOUS at 20:58

## 2024-03-22 RX ADMIN — FAMOTIDINE 20 MG: 10 INJECTION, SOLUTION INTRAVENOUS at 08:55

## 2024-03-22 RX ADMIN — PERFLUTREN 2 ML: 6.52 INJECTION, SUSPENSION INTRAVENOUS at 10:19

## 2024-03-22 RX ADMIN — LIOTHYRONINE SODIUM 5 MCG: 5 TABLET ORAL at 08:55

## 2024-03-22 RX ADMIN — 0.12% CHLORHEXIDINE GLUCONATE 15 ML: 1.2 RINSE ORAL at 08:55

## 2024-03-22 RX ADMIN — POTASSIUM CHLORIDE 10 MEQ: 7.46 INJECTION, SOLUTION INTRAVENOUS at 11:30

## 2024-03-22 ASSESSMENT — PULMONARY FUNCTION TESTS
PIF_VALUE: 33
PIF_VALUE: 25
PIF_VALUE: 25
PIF_VALUE: 12
PIF_VALUE: 27

## 2024-03-22 ASSESSMENT — PAIN SCALES - GENERAL
PAINLEVEL_OUTOF10: 0
PAINLEVEL_OUTOF10: 3
PAINLEVEL_OUTOF10: 0

## 2024-03-22 NOTE — PLAN OF CARE
Problem: Discharge Planning  Goal: Discharge to home or other facility with appropriate resources  Outcome: Progressing     Problem: Pain  Goal: Verbalizes/displays adequate comfort level or baseline comfort level  Outcome: Progressing     Problem: Safety - Medical Restraint  Goal: Remains free of injury from restraints (Restraint for Interference with Medical Device)  Description: INTERVENTIONS:  1. Determine that other, less restrictive measures have been tried or would not be effective before applying the restraint  2. Evaluate the patient's condition at the time of restraint application  3. Inform patient/family regarding the reason for restraint  4. Q2H: Monitor safety, psychosocial status, comfort, nutrition and hydration  Outcome: Progressing  Flowsheets  Taken 3/22/2024 1000 by Riri Chanel RN  Remains free of injury from restraints (restraint for interference with medical device):   Determine that other, less restrictive measures have been tried or would not be effective before applying the restraint   Evaluate the patient's condition at the time of restraint application  Taken 3/22/2024 0800 by Kerri Morejon RN  Remains free of injury from restraints (restraint for interference with medical device): Every 2 hours: Monitor safety, psychosocial status, comfort, nutrition and hydration  Taken 3/22/2024 0600 by Vida Bennett RN  Remains free of injury from restraints (restraint for interference with medical device): Every 2 hours: Monitor safety, psychosocial status, comfort, nutrition and hydration  Taken 3/22/2024 0400 by Vida Bennett RN  Remains free of injury from restraints (restraint for interference with medical device): Every 2 hours: Monitor safety, psychosocial status, comfort, nutrition and hydration  Taken 3/22/2024 0200 by Vida Bennett RN  Remains free of injury from restraints (restraint for interference with medical device): Every 2 hours: Monitor safety, psychosocial status,

## 2024-03-22 NOTE — CONSULTS
Comprehensive Nutrition Assessment    Type and Reason for Visit:  Initial, Consult, NPO/Clear Liquid    Nutrition Recommendations/Plan:   Initiate tube feeding regimen: if not extubated  Formula: Vital HP  Start at 20 ml/hr  Advance as tolerated by 10 ml q 6 hours  Goal Rate: 80 ml/hr x 20 hours (for anticipation of holding tube feeds for standard nursing care)  Water Flushes: 30 mL q 4 hours (180 mL total)  Goal Regimen Provides (with modulars):  1600 kcal, 140g protein, 1524 ml free water, 100% RDIs  Continue to monitor tolerance of EN, weight, labs, and plan of care during admission.      Malnutrition Assessment:  Malnutrition Status:  At risk for malnutrition (Comment) (NPO, vent) (03/22/24 1013)    Context:  Acute Illness       Nutrition History and Allergies:   PMHx: depression, HTN, reported epilepsy, hypothyroidism. Wt hx: 300 lb (12/26/23), 300 lb (3/11/24), 331 lb (3/22/24). No visual wasting noted. NKFA per chart.    Nutrition Assessment:    Admitted with abd pain, AMS, resp failure requiring intubation 3/21. Consult noted for TF ordering and management. Discussed care during interdisciplinary rounds. Low K, to order lytes replacements. Plan for SBT with possible extubation. Start TF if not extubated per MD.    Nutrition Related Findings:    Last BM (including prior to admit): 03/21/24  Edema: Right upper extremity, Generalized, Left upper extremity, Right lower extremity, Left lower extremity, Periorbital  Edema Generalized: +3 Pertinent Meds: lovenox, pepcid, solucortef, synthroid, vanco, precedex gtt, fentanyl gtt Pertinent Labs: POC Glucose 125-130 mg/dl x 24 hrs, K 3.2 L Wound Type: None       Current Nutrition Intake & Therapies:    Average Meal Intake: NPO     No diet orders on file    Anthropometric Measures:  Height: 157.5 cm (5' 2\")  Ideal Body Weight (IBW): 110 lbs (50 kg)    Admission Body Weight: 150.1 kg (330 lb 14.6 oz)  Current Body Weight: 150.1 kg (330 lb 14.6 oz), 300.8 % IBW.

## 2024-03-22 NOTE — PLAN OF CARE
Problem: Discharge Planning  Goal: Discharge to home or other facility with appropriate resources  3/22/2024 1223 by Riri Chanel RN  Outcome: Progressing  3/22/2024 1006 by Riri Chanel RN  Outcome: Progressing     Problem: Pain  Goal: Verbalizes/displays adequate comfort level or baseline comfort level  3/22/2024 1223 by Riri Chanel RN  Outcome: Progressing  3/22/2024 1006 by Riri Chanel RN  Outcome: Progressing  Flowsheets (Taken 3/22/2024 0800)  Verbalizes/displays adequate comfort level or baseline comfort level:   Encourage patient to monitor pain and request assistance   Assess pain using appropriate pain scale   Implement non-pharmacological measures as appropriate and evaluate response   Consider cultural and social influences on pain and pain management     Problem: Safety - Medical Restraint  Goal: Remains free of injury from restraints (Restraint for Interference with Medical Device)  Description: INTERVENTIONS:  1. Determine that other, less restrictive measures have been tried or would not be effective before applying the restraint  2. Evaluate the patient's condition at the time of restraint application  3. Inform patient/family regarding the reason for restraint  4. Q2H: Monitor safety, psychosocial status, comfort, nutrition and hydration  3/22/2024 1223 by Riri Chanel RN  Outcome: Progressing  Flowsheets (Taken 3/22/2024 1200)  Remains free of injury from restraints (restraint for interference with medical device):   Determine that other, less restrictive measures have been tried or would not be effective before applying the restraint   Inform patient/family regarding the reason for restraint   Evaluate the patient's condition at the time of restraint application  3/22/2024 1006 by Riri Chanel RN  Outcome: Progressing  Flowsheets  Taken 3/22/2024 1000 by Riri Chanel RN  Remains free of injury from restraints (restraint for interference with medical

## 2024-03-22 NOTE — CONSULTS
Pt is a 33 year old woman admittted with myxedema coma. Consultation was made for assistance in its management.     Pt has had hypothyroidism since age 18. Her prescribed therapy is 100 mcg per day of levothyroxine. She has been depressed however, and has not taken any for a year.     ON admission she was in respiratory failure. She was placed on artifical ventilation and given 400 mcg of IF thyroxine. She has awakened, and has been extubated. Levothyroxine dose now is 100 mcg IV daily.     Recommend:  Increase daily dose to 200 mcg per day  Continue liothyronine at same dose.   Measure free T4 daily to guide treatment.   4.   Once T4 is up to 1.6, she can be switched to oral Synthroid. Dose should be the same 200 mcg daily. This can be continued into the outpatient setting.   5. She should see us in the office in four weeks after discharge.

## 2024-03-22 NOTE — PROCEDURES
Called to unit to place midline due to limited vasculature.  Patient currently has two PIVs in place, but physician would like a more stable line.  Right upper extremity was assessed via ultrasound and cephalic was the only vessel of adequate size and depth for midline insertion.  Midline was inserted under sterile conditions using modified seldinger technique.  Patient tolerated the procedure well.  Midline flushes easily with excellent blood return.  Stabilization device and CHG dressing applied.  Post insertion arm circumference was 47 cm.  Midline was 13 cm in length with 0 cm exposed.  Catheter occupied 6% of vessel.  Nurse notified midline was able to be used.  Patient tolerated procedure well with no adverse effects noted.      REF:  [id number].   LOT:  [id number]  EXP 04/30/2025

## 2024-03-22 NOTE — CARE COORDINATION
03/22/24 1328   /Social Work Whiteboard Notes   /Social Work Whiteboard RED-3/22/24. Patient not medically cleared for discharge/transfer out of ICU. Vent/ETT/SBT. Plan to extubate today. Endocrine consult. IV ABX/Lasix. BLE-vascular studies today. Dispo: Home with family when medically cleared.          Nakita Emmanueles  Care Management  [phone]

## 2024-03-22 NOTE — INTERDISCIPLINARY ROUNDS
[facility]  Pulmonary, Critical Care, and Sleep Medicine  Interdisciplinary and Ventilator Weaning Rounds    Patient discussed in morning walking rounds and interdisciplinary rounds.    ICU admission day: 3/1/24     Overnight events:   No acute events overnight         Assessments and best practice:  Ventilator  Ventilator Day(s): 2  Vent Settings  FiO2 : (S) 30 % (Weaned from 40)  Resp Rate (Set): (S) 16 bpm (Reduced based on AM ABG)  Vt (Set, mL): 460 mL  PEEP/CPAP (cmH2O): 5  Insp Time (sec): (S) 1 sec (Increased to meet pt demand)  Insp Rise Time (%): 50 %  Flow Sensitivity: 3 L/min  Disconnect Sensitivity (%): 75 %   VAP bundle, aim to keep peak plateau pressure 25-30cm H2O  Weaning assessed and documented   Patient doesmeet criteria for SBT.   Patient is not on sedation holiday.  Plan to wean with PS N/A .  Outcome: remain on mechanical ventilator    Final plan: remain on mechanical ventilator   Glycemic control-SSI     Diet  NPO    Stress ulcer prophylaxis.  Pepcid  DVT prophylaxis.  Lovenox  Need for Lines, plaza assessed.  Yes  Restraint Reevaluation     Yes  I have reevaluated the patient one hour after initiation of intervention. The patient is comfortable, uninjured, but continues to pose an imminent risk of injury to self to themselves and/or serious disruption of medical treatment required to keep patient stable. The patient's current medical and behavioral conditions that warrant the use intervention include Behavioral management problems and Pulling out devices:  Pulling tubes.  Restraint or seclusion will be discontinued at the earliest possible time, regardless of the scheduled expiration of the order. Based on my evaluation, restraints will be continued: Yes  Disposition regarding transferring out of the ICU  RED      Family contact/MPOA:   GusmanBettie boston Parent [phone]     Family updated: will update family today     Palliative consult within 3 days of

## 2024-03-22 NOTE — INTERDISCIPLINARY ROUNDS
I have discussed with the mother the rationale for blood component transfusion; its benefits in treating or preventing fatigue, organ damage, or death; and its risk which includes mild transfusion reactions, rare risk of blood borne infection, or more serious but rare reactions. I have discussed the alternatives to transfusion, including the risk and consequences of not receiving transfusion. The mother had an opportunity to ask questions and had agreed to proceed with transfusion of blood components.     Blood consent signed over the phone.

## 2024-03-23 PROBLEM — D72.829 LEUKOCYTOSIS: Status: ACTIVE | Noted: 2024-03-23

## 2024-03-23 PROBLEM — F15.10 METHAMPHETAMINE ABUSE (HCC): Status: ACTIVE | Noted: 2024-03-23

## 2024-03-23 LAB
ABO + RH BLD: NORMAL
ANION GAP SERPL CALC-SCNC: 8 MMOL/L (ref 3–18)
BACTERIA SPEC CULT: NORMAL
BASOPHILS # BLD: 0 K/UL (ref 0–0.1)
BASOPHILS NFR BLD: 0 % (ref 0–2)
BLD PROD TYP BPU: NORMAL
BLOOD BANK BLOOD PRODUCT EXPIRATION DATE: NORMAL
BLOOD BANK DISPENSE STATUS: NORMAL
BLOOD BANK ISBT PRODUCT BLOOD TYPE: 9500
BLOOD BANK PRODUCT CODE: NORMAL
BLOOD BANK UNIT TYPE AND RH: NORMAL
BLOOD GROUP ANTIBODIES SERPL: NORMAL
BPU ID: NORMAL
BUN SERPL-MCNC: 17 MG/DL (ref 7–18)
BUN/CREAT SERPL: 12 (ref 12–20)
CALCIUM SERPL-MCNC: 9.4 MG/DL (ref 8.5–10.1)
CALLED TO: NORMAL
CHLORIDE SERPL-SCNC: 105 MMOL/L (ref 100–111)
CO2 SERPL-SCNC: 29 MMOL/L (ref 21–32)
CREAT SERPL-MCNC: 1.42 MG/DL (ref 0.6–1.3)
CROSSMATCH RESULT: NORMAL
DIFFERENTIAL METHOD BLD: ABNORMAL
EOSINOPHIL # BLD: 0 K/UL (ref 0–0.4)
EOSINOPHIL NFR BLD: 0 % (ref 0–5)
ERYTHROCYTE [DISTWIDTH] IN BLOOD BY AUTOMATED COUNT: 23.3 % (ref 11.6–14.5)
FERRITIN SERPL-MCNC: 109 NG/ML (ref 8–388)
GLUCOSE BLD STRIP.AUTO-MCNC: 147 MG/DL (ref 70–110)
GLUCOSE BLD STRIP.AUTO-MCNC: 155 MG/DL (ref 70–110)
GLUCOSE BLD STRIP.AUTO-MCNC: 156 MG/DL (ref 70–110)
GLUCOSE BLD STRIP.AUTO-MCNC: 178 MG/DL (ref 70–110)
GLUCOSE BLD STRIP.AUTO-MCNC: 194 MG/DL (ref 70–110)
GLUCOSE SERPL-MCNC: 158 MG/DL (ref 74–99)
GRAM STN SPEC: NORMAL
GRAM STN SPEC: NORMAL
HCT VFR BLD AUTO: 27.8 % (ref 35–45)
HGB BLD-MCNC: 7.8 G/DL (ref 12–16)
IMM GRANULOCYTES # BLD AUTO: 0.6 K/UL (ref 0–0.04)
IMM GRANULOCYTES NFR BLD AUTO: 4 % (ref 0–0.5)
LYMPHOCYTES # BLD: 1.1 K/UL (ref 0.9–3.6)
LYMPHOCYTES NFR BLD: 7 % (ref 21–52)
MAGNESIUM SERPL-MCNC: 2.5 MG/DL (ref 1.6–2.6)
MCH RBC QN AUTO: 20.5 PG (ref 24–34)
MCHC RBC AUTO-ENTMCNC: 28.1 G/DL (ref 31–37)
MCV RBC AUTO: 73.2 FL (ref 78–100)
MONOCYTES # BLD: 0.6 K/UL (ref 0.05–1.2)
MONOCYTES NFR BLD: 4 % (ref 3–10)
NEUTS SEG # BLD: 13.3 K/UL (ref 1.8–8)
NEUTS SEG NFR BLD: 85 % (ref 40–73)
NRBC # BLD: 0.06 K/UL (ref 0–0.01)
NRBC BLD-RTO: 0.4 PER 100 WBC
PHOSPHATE SERPL-MCNC: 4 MG/DL (ref 2.5–4.9)
PLATELET # BLD AUTO: 359 K/UL (ref 135–420)
PLATELET COMMENT: ABNORMAL
PMV BLD AUTO: 10.8 FL (ref 9.2–11.8)
POTASSIUM SERPL-SCNC: 3.4 MMOL/L (ref 3.5–5.5)
POTASSIUM SERPL-SCNC: 3.5 MMOL/L (ref 3.5–5.5)
RBC # BLD AUTO: 3.8 M/UL (ref 4.2–5.3)
RBC MORPH BLD: ABNORMAL
SERVICE CMNT-IMP: NORMAL
SODIUM SERPL-SCNC: 142 MMOL/L (ref 136–145)
SPECIMEN EXP DATE BLD: NORMAL
T3FREE SERPL-MCNC: 1.3 PG/ML (ref 2.18–3.98)
THYROGLOB AB SERPL-ACNC: >2250 IU/ML (ref 0–0.9)
THYROPEROXIDASE AB SERPL-ACNC: >600 IU/ML (ref 0–34)
TSH SERPL DL<=0.05 MIU/L-ACNC: 34.9 UIU/ML (ref 0.36–3.74)
UNIT DIVISION: 0
UNIT ISSUE DATE/TIME: NORMAL
WBC # BLD AUTO: 15.6 K/UL (ref 4.6–13.2)

## 2024-03-23 PROCEDURE — 85025 COMPLETE CBC W/AUTO DIFF WBC: CPT

## 2024-03-23 PROCEDURE — 94761 N-INVAS EAR/PLS OXIMETRY MLT: CPT

## 2024-03-23 PROCEDURE — 6360000002 HC RX W HCPCS: Performed by: INTERNAL MEDICINE

## 2024-03-23 PROCEDURE — 97535 SELF CARE MNGMENT TRAINING: CPT

## 2024-03-23 PROCEDURE — 99233 SBSQ HOSP IP/OBS HIGH 50: CPT | Performed by: INTERNAL MEDICINE

## 2024-03-23 PROCEDURE — 84443 ASSAY THYROID STIM HORMONE: CPT

## 2024-03-23 PROCEDURE — 84100 ASSAY OF PHOSPHORUS: CPT

## 2024-03-23 PROCEDURE — 6370000000 HC RX 637 (ALT 250 FOR IP): Performed by: STUDENT IN AN ORGANIZED HEALTH CARE EDUCATION/TRAINING PROGRAM

## 2024-03-23 PROCEDURE — 36415 COLL VENOUS BLD VENIPUNCTURE: CPT

## 2024-03-23 PROCEDURE — 84132 ASSAY OF SERUM POTASSIUM: CPT

## 2024-03-23 PROCEDURE — 6360000002 HC RX W HCPCS: Performed by: PHYSICIAN ASSISTANT

## 2024-03-23 PROCEDURE — 99291 CRITICAL CARE FIRST HOUR: CPT | Performed by: INTERNAL MEDICINE

## 2024-03-23 PROCEDURE — 2700000000 HC OXYGEN THERAPY PER DAY

## 2024-03-23 PROCEDURE — 2580000003 HC RX 258: Performed by: STUDENT IN AN ORGANIZED HEALTH CARE EDUCATION/TRAINING PROGRAM

## 2024-03-23 PROCEDURE — 2500000003 HC RX 250 WO HCPCS: Performed by: STUDENT IN AN ORGANIZED HEALTH CARE EDUCATION/TRAINING PROGRAM

## 2024-03-23 PROCEDURE — 6370000000 HC RX 637 (ALT 250 FOR IP): Performed by: INTERNAL MEDICINE

## 2024-03-23 PROCEDURE — A4216 STERILE WATER/SALINE, 10 ML: HCPCS | Performed by: STUDENT IN AN ORGANIZED HEALTH CARE EDUCATION/TRAINING PROGRAM

## 2024-03-23 PROCEDURE — 82962 GLUCOSE BLOOD TEST: CPT

## 2024-03-23 PROCEDURE — 84481 FREE ASSAY (FT-3): CPT

## 2024-03-23 PROCEDURE — 83735 ASSAY OF MAGNESIUM: CPT

## 2024-03-23 PROCEDURE — 97530 THERAPEUTIC ACTIVITIES: CPT

## 2024-03-23 PROCEDURE — 97166 OT EVAL MOD COMPLEX 45 MIN: CPT

## 2024-03-23 PROCEDURE — 6360000002 HC RX W HCPCS: Performed by: STUDENT IN AN ORGANIZED HEALTH CARE EDUCATION/TRAINING PROGRAM

## 2024-03-23 PROCEDURE — 2580000003 HC RX 258: Performed by: INTERNAL MEDICINE

## 2024-03-23 PROCEDURE — 97162 PT EVAL MOD COMPLEX 30 MIN: CPT

## 2024-03-23 PROCEDURE — 2000000000 HC ICU R&B

## 2024-03-23 PROCEDURE — 80048 BASIC METABOLIC PNL TOTAL CA: CPT

## 2024-03-23 PROCEDURE — 6370000000 HC RX 637 (ALT 250 FOR IP): Performed by: NURSE PRACTITIONER

## 2024-03-23 PROCEDURE — 94660 CPAP INITIATION&MGMT: CPT

## 2024-03-23 RX ORDER — MAGNESIUM SULFATE IN WATER 40 MG/ML
2000 INJECTION, SOLUTION INTRAVENOUS PRN
Status: DISCONTINUED | OUTPATIENT
Start: 2024-03-23 | End: 2024-03-25 | Stop reason: HOSPADM

## 2024-03-23 RX ORDER — INSULIN LISPRO 100 [IU]/ML
0-4 INJECTION, SOLUTION INTRAVENOUS; SUBCUTANEOUS
Status: DISCONTINUED | OUTPATIENT
Start: 2024-03-23 | End: 2024-03-25 | Stop reason: HOSPADM

## 2024-03-23 RX ORDER — POTASSIUM CHLORIDE 20 MEQ/1
40 TABLET, EXTENDED RELEASE ORAL PRN
Status: DISCONTINUED | OUTPATIENT
Start: 2024-03-23 | End: 2024-03-25 | Stop reason: HOSPADM

## 2024-03-23 RX ORDER — POTASSIUM CHLORIDE 7.45 MG/ML
10 INJECTION INTRAVENOUS
Status: COMPLETED | OUTPATIENT
Start: 2024-03-23 | End: 2024-03-23

## 2024-03-23 RX ORDER — POTASSIUM CHLORIDE 7.45 MG/ML
10 INJECTION INTRAVENOUS PRN
Status: DISCONTINUED | OUTPATIENT
Start: 2024-03-23 | End: 2024-03-25 | Stop reason: HOSPADM

## 2024-03-23 RX ORDER — LIOTHYRONINE SODIUM 5 UG/1
5 TABLET ORAL EVERY 8 HOURS
Status: DISCONTINUED | OUTPATIENT
Start: 2024-03-23 | End: 2024-03-25 | Stop reason: HOSPADM

## 2024-03-23 RX ADMIN — HYDROCORTISONE SODIUM SUCCINATE 100 MG: 100 INJECTION, POWDER, FOR SOLUTION INTRAMUSCULAR; INTRAVENOUS at 14:02

## 2024-03-23 RX ADMIN — ENOXAPARIN SODIUM 30 MG: 100 INJECTION SUBCUTANEOUS at 09:24

## 2024-03-23 RX ADMIN — POTASSIUM CHLORIDE 10 MEQ: 7.46 INJECTION, SOLUTION INTRAVENOUS at 07:46

## 2024-03-23 RX ADMIN — LIOTHYRONINE SODIUM 5 MCG: 5 TABLET ORAL at 23:13

## 2024-03-23 RX ADMIN — POTASSIUM CHLORIDE 10 MEQ: 7.46 INJECTION, SOLUTION INTRAVENOUS at 06:49

## 2024-03-23 RX ADMIN — HYDROCORTISONE SODIUM SUCCINATE 100 MG: 100 INJECTION, POWDER, FOR SOLUTION INTRAMUSCULAR; INTRAVENOUS at 06:51

## 2024-03-23 RX ADMIN — PIPERACILLIN AND TAZOBACTAM 4500 MG: 4; .5 INJECTION, POWDER, FOR SOLUTION INTRAVENOUS at 06:06

## 2024-03-23 RX ADMIN — SODIUM CHLORIDE 125 MG: 9 INJECTION, SOLUTION INTRAVENOUS at 10:50

## 2024-03-23 RX ADMIN — FAMOTIDINE 20 MG: 10 INJECTION, SOLUTION INTRAVENOUS at 20:18

## 2024-03-23 RX ADMIN — ENOXAPARIN SODIUM 30 MG: 100 INJECTION SUBCUTANEOUS at 20:40

## 2024-03-23 RX ADMIN — LIOTHYRONINE SODIUM 5 MCG: 5 TABLET ORAL at 10:07

## 2024-03-23 RX ADMIN — POTASSIUM CHLORIDE 10 MEQ: 7.46 INJECTION, SOLUTION INTRAVENOUS at 05:52

## 2024-03-23 RX ADMIN — FAMOTIDINE 20 MG: 10 INJECTION, SOLUTION INTRAVENOUS at 09:24

## 2024-03-23 RX ADMIN — PIPERACILLIN AND TAZOBACTAM 4500 MG: 4; .5 INJECTION, POWDER, FOR SOLUTION INTRAVENOUS at 21:53

## 2024-03-23 RX ADMIN — LEVOTHYROXINE SODIUM ANHYDROUS 100 MCG: 100 INJECTION, POWDER, LYOPHILIZED, FOR SOLUTION INTRAVENOUS at 08:32

## 2024-03-23 RX ADMIN — POTASSIUM BICARBONATE 40 MEQ: 782 TABLET, EFFERVESCENT ORAL at 21:00

## 2024-03-23 RX ADMIN — VANCOMYCIN HYDROCHLORIDE 1500 MG: 1 INJECTION, POWDER, LYOPHILIZED, FOR SOLUTION INTRAVENOUS at 05:52

## 2024-03-23 RX ADMIN — HYDROCORTISONE SODIUM SUCCINATE 50 MG: 100 INJECTION, POWDER, FOR SOLUTION INTRAMUSCULAR; INTRAVENOUS at 20:26

## 2024-03-23 RX ADMIN — LIOTHYRONINE SODIUM 5 MCG: 5 TABLET ORAL at 17:11

## 2024-03-23 RX ADMIN — LIOTHYRONINE SODIUM 5 MCG: 5 TABLET ORAL at 00:27

## 2024-03-23 RX ADMIN — PIPERACILLIN AND TAZOBACTAM 4500 MG: 4; .5 INJECTION, POWDER, FOR SOLUTION INTRAVENOUS at 14:02

## 2024-03-23 ASSESSMENT — PAIN SCALES - GENERAL: PAINLEVEL_OUTOF10: 0

## 2024-03-23 NOTE — PLAN OF CARE
Problem: Occupational Therapy - Adult  Goal: By Discharge: Performs self-care activities at highest level of function for planned discharge setting.  See evaluation for individualized goals.  Description: Occupational Therapy Goals:  Initiated 3/23/2024 to be met within 7-10 days.    1.  Patient will perform grooming with supervision/set-up while standing at the sink for > 2 min with Good balance.   2.  Patient will perform bathing with supervision/set-up using AE prn.  3.  Patient will perform lower body dressing with supervision/set-up (underwear, shoes) using AE prn.  4.  Patient will perform toilet transfers with supervision/set-up.  5.  Patient will perform all aspects of toileting with supervision/set-up.  6.  Patient will participate in upper extremity therapeutic exercise/activities with supervision/set-up for 8 minutes to improve endurance and UB strength needed for ADLs    7.  Patient will utilize energy conservation techniques during functional activities with verbal cues.    PLOF: Pt lives with family in 2SH with bedroom upstairs, Mod Ind for ADLs, using cane sometimes for functional mobility, does not wear socks.    Outcome: Progressing   OCCUPATIONAL THERAPY EVALUATION    Patient: Aditya uGsman (33 y.o. female)  Date: 3/23/2024  Primary Diagnosis: Acute respiratory failure (HCC) [J96.00]  Generalized abdominal pain [R10.84]  Leukocytosis, unspecified type [D72.829]       Precautions: General Precautions, Fall Risk    ASSESSMENT :    Pt cleared to participate in OT evaluation by RN. Upon entering room, pt received in bed, alert, and agreeable to OT eval/treatment. Pt with moderate non-pitting edema in BUE, affecting her ability to utilize bilateral UEs for ADLs, requiring SBA for self-feeding. Pt educated on edema management techniques, pt demod understanding. Pt required Min A to sit up at EOB, initially c/o dizziness which resolved after ~ 3 min, vitals WFL. Max A to don socks (pt reports she

## 2024-03-23 NOTE — PLAN OF CARE
Problem: Discharge Planning  Goal: Discharge to home or other facility with appropriate resources  Outcome: Progressing     Problem: Pain  Goal: Verbalizes/displays adequate comfort level or baseline comfort level  Outcome: Progressing     Problem: Skin/Tissue Integrity  Goal: Absence of new skin breakdown  Description: 1.  Monitor for areas of redness and/or skin breakdown  2.  Assess vascular access sites hourly  3.  Every 4-6 hours minimum:  Change oxygen saturation probe site  4.  Every 4-6 hours:  If on nasal continuous positive airway pressure, respiratory therapy assess nares and determine need for appliance change or resting period.  Outcome: Progressing     Problem: Safety - Adult  Goal: Free from fall injury  Outcome: Progressing     Problem: Nutrition Deficit:  Goal: Optimize nutritional status  Outcome: Progressing     Problem: Physical Therapy - Adult  Goal: By Discharge: Performs mobility at highest level of function for planned discharge setting.  See evaluation for individualized goals.  Description: Initiated  3/23/2024  to be met within 7-10 days.    1.  Patient will move from supine to sit and sit to supine , scoot up and down, and roll side to side in bed with modified independence.    2.  Patient will transfer from bed to chair and chair to bed with modified independence using the least restrictive device.  3.  Patient will perform sit to stand with modified independence.  4.  Patient will ambulate with modified independence for 150 feet with the least restrictive device.   5.  Patient will ascend/descend 3 stairs with ISAMAR handrail(s) with modified independence.    PLOF: Pt lives in two story apartment with family, 3 ISAMAR with handrails.  Pt is independent with mobility, has cane that she uses intermittently.     3/23/2024 1332 by Jessika Ribeiro PT  Outcome: Progressing     Problem: Occupational Therapy - Adult  Goal: By Discharge: Performs self-care activities at highest level of function for

## 2024-03-23 NOTE — PLAN OF CARE
Problem: Physical Therapy - Adult  Goal: By Discharge: Performs mobility at highest level of function for planned discharge setting.  See evaluation for individualized goals.  Description: Initiated  3/23/2024  to be met within 7-10 days.    1.  Patient will move from supine to sit and sit to supine , scoot up and down, and roll side to side in bed with modified independence.    2.  Patient will transfer from bed to chair and chair to bed with modified independence using the least restrictive device.  3.  Patient will perform sit to stand with modified independence.  4.  Patient will ambulate with modified independence for 150 feet with the least restrictive device.   5.  Patient will ascend/descend 3 stairs with ISAMAR handrail(s) with modified independence.    PLOF: Pt lives in two story apartment with family, 3 ISAMAR with handrails.  Pt is independent with mobility, has cane that she uses intermittently.     Outcome: Progressing   PHYSICAL THERAPY EVALUATION    Patient: Aditya Gusman (33 y.o. female)  Date: 3/23/2024  Primary Diagnosis: Acute respiratory failure (HCC) [J96.00]  Generalized abdominal pain [R10.84]  Leukocytosis, unspecified type [D72.829]       Precautions: General Precautions, Fall Risk,      ASSESSMENT :  Pt resting in bed upon entering room for PT evaluation, NAD and agreeable to session.  Co-tx with OT due to medical complexity and improved safety with all functional mobility.  Pt with gross swelling noted in b/l LEs and UEs, pt notes swelling in LEs is normal but new in her UEs.  Pt was Fausto for supine to sit transfer, with additional time, good sitting balance.  Pt with gross b/l LE strength grossly decreased however, WFL.  STS transfer with RW and CGA, fair standing balance.  Pt notes no significant dizziness, stood for 1-2 min then took 3-4 side steps towards HOB with CGA.  Pt returned sitting EOB, utilized stool under LEs to help scoot herself back in to bed.  Pt was Fausto to return

## 2024-03-24 ENCOUNTER — APPOINTMENT (OUTPATIENT)
Facility: HOSPITAL | Age: 34
DRG: 208 | End: 2024-03-24
Payer: MEDICAID

## 2024-03-24 LAB
ANION GAP SERPL CALC-SCNC: 7 MMOL/L (ref 3–18)
BASOPHILS # BLD: 0.1 K/UL (ref 0–0.1)
BASOPHILS NFR BLD: 1 % (ref 0–2)
BUN SERPL-MCNC: 15 MG/DL (ref 7–18)
BUN/CREAT SERPL: 13 (ref 12–20)
CALCIUM SERPL-MCNC: 8.6 MG/DL (ref 8.5–10.1)
CHLORIDE SERPL-SCNC: 106 MMOL/L (ref 100–111)
CO2 SERPL-SCNC: 29 MMOL/L (ref 21–32)
CREAT SERPL-MCNC: 1.19 MG/DL (ref 0.6–1.3)
DIFFERENTIAL METHOD BLD: ABNORMAL
EOSINOPHIL # BLD: 0 K/UL (ref 0–0.4)
EOSINOPHIL NFR BLD: 0 % (ref 0–5)
ERYTHROCYTE [DISTWIDTH] IN BLOOD BY AUTOMATED COUNT: 23.4 % (ref 11.6–14.5)
GLUCOSE BLD STRIP.AUTO-MCNC: 127 MG/DL (ref 70–110)
GLUCOSE BLD STRIP.AUTO-MCNC: 138 MG/DL (ref 70–110)
GLUCOSE SERPL-MCNC: 155 MG/DL (ref 74–99)
HCT VFR BLD AUTO: 27.4 % (ref 35–45)
HGB BLD-MCNC: 7.5 G/DL (ref 12–16)
IMM GRANULOCYTES # BLD AUTO: 0.7 K/UL (ref 0–0.04)
IMM GRANULOCYTES NFR BLD AUTO: 5 % (ref 0–0.5)
LYMPHOCYTES # BLD: 1.4 K/UL (ref 0.9–3.6)
LYMPHOCYTES NFR BLD: 10 % (ref 21–52)
MAGNESIUM SERPL-MCNC: 2.7 MG/DL (ref 1.6–2.6)
MCH RBC QN AUTO: 20.6 PG (ref 24–34)
MCHC RBC AUTO-ENTMCNC: 27.4 G/DL (ref 31–37)
MCV RBC AUTO: 75.3 FL (ref 78–100)
MONOCYTES # BLD: 0.7 K/UL (ref 0.05–1.2)
MONOCYTES NFR BLD: 5 % (ref 3–10)
NEUTS SEG # BLD: 11 K/UL (ref 1.8–8)
NEUTS SEG NFR BLD: 79 % (ref 40–73)
NRBC # BLD: 0.06 K/UL (ref 0–0.01)
NRBC BLD-RTO: 0.4 PER 100 WBC
NT PRO BNP: 793 PG/ML (ref 0–450)
PHOSPHATE SERPL-MCNC: 3.4 MG/DL (ref 2.5–4.9)
PLATELET # BLD AUTO: 231 K/UL (ref 135–420)
PLATELET COMMENT: ABNORMAL
POTASSIUM SERPL-SCNC: 4.4 MMOL/L (ref 3.5–5.5)
PROCALCITONIN SERPL-MCNC: 0.39 NG/ML
RBC # BLD AUTO: 3.64 M/UL (ref 4.2–5.3)
RBC MORPH BLD: ABNORMAL
SODIUM SERPL-SCNC: 142 MMOL/L (ref 136–145)
T3FREE SERPL-MCNC: 1.1 PG/ML (ref 2.18–3.98)
TSH SERPL DL<=0.05 MIU/L-ACNC: 28.2 UIU/ML (ref 0.36–3.74)
WBC # BLD AUTO: 13.9 K/UL (ref 4.6–13.2)

## 2024-03-24 PROCEDURE — 6360000002 HC RX W HCPCS: Performed by: INTERNAL MEDICINE

## 2024-03-24 PROCEDURE — A4216 STERILE WATER/SALINE, 10 ML: HCPCS | Performed by: NURSE PRACTITIONER

## 2024-03-24 PROCEDURE — 2500000003 HC RX 250 WO HCPCS: Performed by: STUDENT IN AN ORGANIZED HEALTH CARE EDUCATION/TRAINING PROGRAM

## 2024-03-24 PROCEDURE — 99233 SBSQ HOSP IP/OBS HIGH 50: CPT | Performed by: INTERNAL MEDICINE

## 2024-03-24 PROCEDURE — 94660 CPAP INITIATION&MGMT: CPT

## 2024-03-24 PROCEDURE — 84100 ASSAY OF PHOSPHORUS: CPT

## 2024-03-24 PROCEDURE — 83880 ASSAY OF NATRIURETIC PEPTIDE: CPT

## 2024-03-24 PROCEDURE — 2500000003 HC RX 250 WO HCPCS: Performed by: NURSE PRACTITIONER

## 2024-03-24 PROCEDURE — 2000000000 HC ICU R&B

## 2024-03-24 PROCEDURE — 2580000003 HC RX 258: Performed by: STUDENT IN AN ORGANIZED HEALTH CARE EDUCATION/TRAINING PROGRAM

## 2024-03-24 PROCEDURE — 71045 X-RAY EXAM CHEST 1 VIEW: CPT

## 2024-03-24 PROCEDURE — 2580000003 HC RX 258: Performed by: INTERNAL MEDICINE

## 2024-03-24 PROCEDURE — 6370000000 HC RX 637 (ALT 250 FOR IP): Performed by: INTERNAL MEDICINE

## 2024-03-24 PROCEDURE — 84145 PROCALCITONIN (PCT): CPT

## 2024-03-24 PROCEDURE — 6360000002 HC RX W HCPCS: Performed by: STUDENT IN AN ORGANIZED HEALTH CARE EDUCATION/TRAINING PROGRAM

## 2024-03-24 PROCEDURE — 83735 ASSAY OF MAGNESIUM: CPT

## 2024-03-24 PROCEDURE — 80048 BASIC METABOLIC PNL TOTAL CA: CPT

## 2024-03-24 PROCEDURE — 2580000003 HC RX 258: Performed by: NURSE PRACTITIONER

## 2024-03-24 PROCEDURE — 82962 GLUCOSE BLOOD TEST: CPT

## 2024-03-24 PROCEDURE — 6370000000 HC RX 637 (ALT 250 FOR IP): Performed by: NURSE PRACTITIONER

## 2024-03-24 PROCEDURE — 85025 COMPLETE CBC W/AUTO DIFF WBC: CPT

## 2024-03-24 PROCEDURE — 36415 COLL VENOUS BLD VENIPUNCTURE: CPT

## 2024-03-24 PROCEDURE — A4216 STERILE WATER/SALINE, 10 ML: HCPCS | Performed by: STUDENT IN AN ORGANIZED HEALTH CARE EDUCATION/TRAINING PROGRAM

## 2024-03-24 PROCEDURE — 84443 ASSAY THYROID STIM HORMONE: CPT

## 2024-03-24 PROCEDURE — 84481 FREE ASSAY (FT-3): CPT

## 2024-03-24 RX ORDER — PANTOPRAZOLE SODIUM 40 MG/1
40 TABLET, DELAYED RELEASE ORAL
Status: DISCONTINUED | OUTPATIENT
Start: 2024-03-25 | End: 2024-03-25 | Stop reason: HOSPADM

## 2024-03-24 RX ORDER — CARVEDILOL 6.25 MG/1
6.25 TABLET ORAL 2 TIMES DAILY WITH MEALS
Status: DISCONTINUED | OUTPATIENT
Start: 2024-03-24 | End: 2024-03-25 | Stop reason: HOSPADM

## 2024-03-24 RX ORDER — FUROSEMIDE 10 MG/ML
20 INJECTION INTRAMUSCULAR; INTRAVENOUS 2 TIMES DAILY
Status: DISCONTINUED | OUTPATIENT
Start: 2024-03-24 | End: 2024-03-25

## 2024-03-24 RX ORDER — FAMOTIDINE 20 MG/1
20 TABLET, FILM COATED ORAL 2 TIMES DAILY
Status: DISCONTINUED | OUTPATIENT
Start: 2024-03-24 | End: 2024-03-24

## 2024-03-24 RX ADMIN — FAMOTIDINE 20 MG: 10 INJECTION, SOLUTION INTRAVENOUS at 09:08

## 2024-03-24 RX ADMIN — LEVOTHYROXINE SODIUM ANHYDROUS 200 MCG: 100 INJECTION, POWDER, LYOPHILIZED, FOR SOLUTION INTRAVENOUS at 09:09

## 2024-03-24 RX ADMIN — PIPERACILLIN AND TAZOBACTAM 4500 MG: 4; .5 INJECTION, POWDER, FOR SOLUTION INTRAVENOUS at 22:01

## 2024-03-24 RX ADMIN — LIOTHYRONINE SODIUM 5 MCG: 5 TABLET ORAL at 09:09

## 2024-03-24 RX ADMIN — HYDROCORTISONE SODIUM SUCCINATE 50 MG: 100 INJECTION, POWDER, FOR SOLUTION INTRAMUSCULAR; INTRAVENOUS at 21:08

## 2024-03-24 RX ADMIN — LIOTHYRONINE SODIUM 5 MCG: 5 TABLET ORAL at 16:21

## 2024-03-24 RX ADMIN — PIPERACILLIN AND TAZOBACTAM 4500 MG: 4; .5 INJECTION, POWDER, FOR SOLUTION INTRAVENOUS at 06:08

## 2024-03-24 RX ADMIN — CARVEDILOL 6.25 MG: 6.25 TABLET, FILM COATED ORAL at 12:23

## 2024-03-24 RX ADMIN — FUROSEMIDE 20 MG: 10 INJECTION, SOLUTION INTRAMUSCULAR; INTRAVENOUS at 18:30

## 2024-03-24 RX ADMIN — SODIUM CHLORIDE 125 MG: 9 INJECTION, SOLUTION INTRAVENOUS at 09:52

## 2024-03-24 RX ADMIN — HYDROCORTISONE SODIUM SUCCINATE 50 MG: 100 INJECTION, POWDER, FOR SOLUTION INTRAMUSCULAR; INTRAVENOUS at 06:07

## 2024-03-24 RX ADMIN — ENOXAPARIN SODIUM 30 MG: 100 INJECTION SUBCUTANEOUS at 09:08

## 2024-03-24 RX ADMIN — LIOTHYRONINE SODIUM 5 MCG: 5 TABLET ORAL at 23:51

## 2024-03-24 RX ADMIN — CARVEDILOL 6.25 MG: 6.25 TABLET, FILM COATED ORAL at 16:21

## 2024-03-24 RX ADMIN — ENOXAPARIN SODIUM 30 MG: 100 INJECTION SUBCUTANEOUS at 21:07

## 2024-03-24 RX ADMIN — FUROSEMIDE 20 MG: 10 INJECTION, SOLUTION INTRAMUSCULAR; INTRAVENOUS at 12:24

## 2024-03-24 RX ADMIN — PIPERACILLIN AND TAZOBACTAM 4500 MG: 4; .5 INJECTION, POWDER, FOR SOLUTION INTRAVENOUS at 13:23

## 2024-03-24 ASSESSMENT — PAIN SCALES - GENERAL
PAINLEVEL_OUTOF10: 0

## 2024-03-24 NOTE — PROGRESS NOTES
attempted to  complete the initial Spiritual Assessment of the patient in bed 2 of the emergency room, and offer Pastoral Care, support to the patient,  There is no advance directive on file.. Her parent is listed as an emergency . Patient is now resting peacefully while on the C-pap machine. Chaplains will continue to follow and will provide pastoral care on an as needed/requested basis.    Chaplain Wolfgang Gates  Board Certified   Spiritual Care Department  [phone]  
Acknowledged duplicate PT orders. Patient already on PT caseload. Please refer to PT evaluation for discharge recommendations and plan of care.       
Dr Beasley is aware of elevated blood pressure today pt is getting coreg and lasix, note on rounds he explained plan of care to patient  1700 slept from 1200 until dinner time, now awake and doing Easter crafts, pt turning self in bed today, also encouraged to pull herself up and she has done this today   
Hospitalist Progress Note    NAME:  Aditya Gusman   :   1990   MRN:   [id number]     Date/Time:  3/23/2024  Subjective:   Chief Complaint: Patient seen as an ICU transfer in short;    Patient is a 33 y.o. female with PMH depression (with hx suicide attempts x2), HTN, reported epilepsy but not on AED per chart review, thyroid disease and methamphetamine abuse who presented with 1 day of generalized abdominal pain and nausea. Noted to be very edematous in all extremities. She became lethargic in the ED and developed acute respiratory acidosis with hypercapnia. She was intubated with subsequent improvement in ABG. UDS +amphetamines. CT abd/pelvis with hepatic steatosis.     CT chest with MF PNA & bibasilar atelectasis;     leukocytosis with WBC 15 but afebrile. Started on abx in the ED. TSH found to be 89.4 with free T4 0.1 and free T3 of 0.5. She was transferred to the ICU and started on IV levothyroxine and liothyronine via NGT.     Patient was liberated from ventilator support 3/22 without complication.Tolerated NIV overnight. Overall improvement of TSH from 89->34 since admission. Endocrine is following.     Patient still with cough and congestion bringing out greenish phlegm.        Review of Systems:  Y  N       Y  N  []   [x]    Fever/chills                                               []   [x]    Chest Pain  [x]   []    Cough                                                       []   [x]    Diarrhea   [x]   []    Sputum                                                     []   []    Constipation  [x]   []    SOB/VINCENT                                                []   []    Nausea/Vomit  []   [x]    Abd Pain                                                    []   []    Tolerating PT  []   [x]    Dysuria                                                      []   []    Tolerating Diet     []  Unable to obtain  ROS due to  []  mental status change  []  sedated   []  intubated     Past Med History and 
[facility].  Pulmonary, Critical Care, and Sleep Medicine    Name: Aditya Gusman MRN: [id number]   : 1990 Hospital: [facility]   Date: 3/22/2024  Admission Date: 3/21/2024     Chart and notes reviewed. Data reviewed. I have evaluated all findings.    [x]I have reviewed the flowsheet and previous day’s notes.    [x]The patient is unable to give any meaningful history or review of systems because the patient is:  [x]Intubated [x]Sedated   []Unresponsive      [x]The patient is critically ill on      [x]Mechanical ventilation []Pressors   []BiPAP []         Interval HPI:  Patient is a 33 y.o. female with PMH depression (with hx suicide attempts x2), HTN, reported epilepsy but not on AED per chart review, thyroid disease and methamphetamine abuse who presented with 1 day of generalized abdominal pain and nausea. Noted to be very edematous in all extremities. She became lethargic in the ED and developed acute respiratory acidosis with hypercapnia. She was intubated with subsequent improvement in ABG. UDS +amphetamines. CT abd/pelvis with hepatic steatosis. CT chest with MF PNA & bibasilar atelectasis; leukocytosis with WBC 15 but afebrile. Started on abx in the ED. TSH found to be 89.4 with free T4 0.1 and free T3 of 0.5. She was transferred to the ICU and started on IV levothyroxine and liothyronine via NGT.    This morning 3/22, pt wakes up briefly with tactile stimulation and answers questions (by nodding) appropriately. Following commands.      Subjective 24  Hospital Day: 2 (admitted 3/21)  Vent Day: 2 (intubated 3/21)  Overnight events: No acute events. ABG this morning with respiratory alkalsis, pH 7.6 with pCO2 29, bicarb 29, pO2 66. RR decreased accordingly.   Mentation/Activity: Sedated, but wakes up with tactile stimulation, answers questions appropriately by nodding; follows commands. On fentanyl drip at 100 -> decreased to 75 mcg/hr. On precedex drip at 
[facility]   Pharmacy Pharmacokinetic Monitoring Service - Vancomycin    Indication: sepsis  Goal AUC/LAUREN: 400-600  Day of Therapy: 2  Additional Antimicrobials: pip-tazo    Pertinent Laboratory Values:   Wt Readings from Last 1 Encounters:   03/22/24 (!) 150.1 kg (331 lb)     Temp Readings from Last 1 Encounters:   03/22/24 98.2 °F (36.8 °C) (Axillary)     Estimated Creatinine Clearance: 103 mL/min (based on SCr of 1.11 mg/dL).    Recent Labs     03/21/24  0152 03/22/24  0400 03/22/24  0530   CREATININE 0.99 1.11  --    BUN 15 11  --    WBC 15.4*  --  14.3*     Pertinent Cultures:  Date Source Results   3/21 blood NGTD   3/21 urine pending   3/21 sputum pending   MRSA Nasal Swab: pending    Assessment:  Date Current Dose Level (mg/L) Timing of Level (h) AUC/LAUREN   3/21 2,500 mg x1  1,500 mg q12h - - -   3/22 1,500 mg q12h  1,500 mg q18h 20.2 12 432  541   Note: Serum concentrations collected for AUC dosing may appear elevated if collected in close proximity to the dose administered, this is not necessarily an indication of toxicity    Plan:  Decrease dose from 1,500 mg q12h to 1,500 mg q18h  No level ordered at this time  Pharmacy will continue to monitor patient and adjust therapy as indicated    Thank you for the consult,  Ernie Lin RPH  3/22/2024  
[facility]   Pharmacy Pharmacokinetic Monitoring Service - Vancomycin     Aditya Gusman is a 33 y.o. female starting on vancomycin therapy for Sepsis of Unknown Etiology. Pharmacy consulted for monitoring and adjustment.    Target Concentration: Goal AUC/LAUREN 400-600 mg*hr/L    Additional Antimicrobials: Piperacillin/Tazobactam    Pertinent Laboratory Values:   Temp: 97.6 °F (36.4 °C), Weight - Scale: 136.1 kg (300 lb)  Recent Labs     03/21/24  0152   CREATININE 0.99   BUN 15   WBC 15.4*     Estimated Creatinine Clearance: 109 mL/min (based on SCr of 0.99 mg/dL).    Pertinent Cultures:  Culture Date Source Results   None - -   MRSA Nasal Swab: N/A. Non-respiratory infection    Plan:  Dosing recommendations based on Bayesian software  Start vancomycin 2500 mg IV x 1 followed by 1gm IV q12h  Anticipated AUC of 492 and trough concentration of 14.1 at steady state  Renal labs as indicated   Vancomycin concentration ordered for  03/23 @ 0400  Pharmacy will continue to monitor patient and adjust therapy as indicated    Thank you for the consult,  ELENA WHEATLEY MARGARITO  3/21/2024   
New OT orders received with patient already on caseload. Will follow up as patient schedule allows. Acknowledged new orders.          Thank you,   Eddie Turcios MS, OTR/L    
No SBT at this time-     03/22/24 0805   Weaning Parameters   Spontaneous Breathing Trial Complete (S)  No (comment)  (Sedated on Fentanyl and Precedex)       
No SBT at this time-     03/22/24 1111   Weaning Parameters   Spontaneous Breathing Trial Complete (S)  No (comment)  (Sedateion off but pt does not wake or follow any commands at this time-)       
Pt extubated to 3 LPM NC following successful SBT and positive cuff- leak test. Suctioned airway pre and post procedure. No complications, no signs of stridor or distress.   
SBT initiated, PS 7, PEEP 5, FiO2 30%. Pt drowsy but wakes and follows commands.     03/22/24 1205   Weaning Parameters   Spontaneous Breathing Trial Complete (S)  Yes   Respiratory Rate Observed 24   Ve 5.96      RSBI 94       Tolerating. Will monitor for signs of apnea or distress.  
This am, transition pt to 3LNC from CPAP.  1400pm Pt tolerating 3L NC  1540pm Adan catheter d/c. Perwic applied onto pt.  1748pm K-3.5. Perfectserve Dr. Beasley    
coma  endocrinology following     Trend daily TSH and free T3 (improving as of 3/22: TSH 89 -> 34    Taper down the stress dose hydrocortisone (50 mg IV bid)    Q6 glucoses. SSI. Avoid hypoglycemia.      Multifocal pneumonia (on chest CT; RPP negative) r/o aspiration PNA;     continue IV Zosyn; check cxr;    Accelerated hypertension start Coreg;    Echo showed;      Image quality is technically difficult. Contrast used: Definity.    Left Ventricle: Normal left ventricular systolic function with a visually estimated EF of 55 - 60%. Left ventricle size is normal. Mildly increased wall thickness. Normal wall motion. Diastolic dysfunction present with normal LV EF.    Right Ventricle: Appears normal in size with reduced systolic function.    Right Ventricle: Not well visualized. Right ventricle size is normal. Reduced systolic function.    Aortic Valve: No stenosis.    Mitral Valve: No regurgitation.    Tricuspid Valve: Mild regurgitation with a centrally directed jet. Estimated RVSP is 59 mmHg, assuming RAP is 15 mmHg    Tricuspid Valve: Mild regurgitation.    Right Atrium: Right atrium appears dilated.    Extracardiac: Large left pleural effusion.    Abdominal pain (reason for presentation to ED, CT abd negative)    Amphetamine abuse (UDS+ on admission 3/21)    ART/OHS on CPAP, with poor compliance    Chronic uncontrolled hypothyroidism with concern for myxedema coma;    Reported history of epilepsy, do not see any AEDs in chart med list    Anasarca     Hepatic steatosis     Recurrent UTIs, current bacteriuria without concern for acute infection    Severe depression with history of suicide attempts x2 and recent ED stay 3/11-3/14 for SI/abd pain (left AMA prior to transfer to inpatient psych)    Chronic microcytic anemia, s/p Venofer 200 mg IV x1 on 3/13    Morbid obesity      ___________________________________________________    Total time spent with patient:     minutes    []  Critical Care time:      
               Imaging:  [x]   I have personally reviewed the patient’s radiographs and reports    Most recent imaging:    CXR 3/22/2024 at 10:16  1.  Endotracheal tube tip is 4.0 cm above the julee.  2.  Bilateral lower lobe dense consolidation or atelectasis, \"New dense RIGHT lower lung opacity. New dense retrocardiac opacity and hazy opacity RIGHT lower lung.\"  3.  Possible small LEFT pleural effusion  4.  Moderate cardiomegaly and/or pericardial effusion    XR ABDOMEN (KUB) (SINGLE AP VIEW)    Result Date: 3/21/2024   Gastric tube course below the diaphragm with tip and sideport overlying the expected location of the stomach.     CT CHEST WO CONTRAST    Result Date: 3/21/2024           1.  Bilateral consolidative densities, suggestive of multilobe pneumonia vs. pulmonary hemorrhage. 2.  S/P NGT or OGT placement.    CT Head W/O Contrast    Result Date: 3/21/2024  No acute intracranial abnormalities.    XR CHEST PORTABLE    Result Date: 3/21/2024  Perceived opacities of the right greater than left medial aspects of the lungs. This could be secondary to atelectasis from mildly hypoinflated state however developing infiltrates, lymphadenopathy or edema is not excluded.     CT ABDOMEN PELVIS W IV CONTRAST Additional Contrast? None    Result Date: 3/21/2024  No clearly acute findings. Perhaps hepatic steatosis bibasilar lung likely dependent atelectasis, but inflammatory component not excluded. Patchy superficial body wall edema. See additional details above.      IMPRESSION:   Acute on chronic hypercapnic respiratory failure requiring intubation and mechanical ventilation. Likely multifactorial with contributions from substance abuse, poorly controlled OHS, and myxedema coma.Extubated 3/22  Myxedema coma   Concern for multifocal pneumonia (on chest CT; RPP negative)  Abdominal pain (reason for presentation to ED, CT abd negative)  Amphetamine abuse (UDS+ on admission 3/21)  ART/OHS on CPAP, with poor compliance 
Additional Notes: The cyst appears to be in the joint space, will refer to ortho
Detail Level: Simple
Render Risk Assessment In Note?: no

## 2024-03-24 NOTE — FLOWSHEET NOTE
2000  Pt resting in bed awake and alert, parents at bedside visiting. Denies pain. , VSS.   Encourage pt to TCDB. Currently wearing 3L's nasal cannula.  Sats  range.      2300  Pt resting, currently on cpap.

## 2024-03-25 ENCOUNTER — HOME HEALTH ADMISSION (OUTPATIENT)
Age: 34
End: 2024-03-25

## 2024-03-25 VITALS
DIASTOLIC BLOOD PRESSURE: 101 MMHG | TEMPERATURE: 98 F | WEIGHT: 293 LBS | BODY MASS INDEX: 53.92 KG/M2 | HEIGHT: 62 IN | SYSTOLIC BLOOD PRESSURE: 155 MMHG | RESPIRATION RATE: 20 BRPM | OXYGEN SATURATION: 95 % | HEART RATE: 71 BPM

## 2024-03-25 PROBLEM — J96.00 ACUTE RESPIRATORY FAILURE (HCC): Status: RESOLVED | Noted: 2024-03-21 | Resolved: 2024-03-25

## 2024-03-25 LAB
ANION GAP SERPL CALC-SCNC: 5 MMOL/L (ref 3–18)
ANION GAP SERPL CALC-SCNC: 7 MMOL/L (ref 3–18)
BASOPHILS # BLD: 0.1 K/UL (ref 0–0.1)
BASOPHILS NFR BLD: 0 % (ref 0–2)
BUN SERPL-MCNC: 17 MG/DL (ref 7–18)
BUN SERPL-MCNC: 17 MG/DL (ref 7–18)
BUN/CREAT SERPL: 14 (ref 12–20)
BUN/CREAT SERPL: 14 (ref 12–20)
CALCIUM SERPL-MCNC: 9 MG/DL (ref 8.5–10.1)
CALCIUM SERPL-MCNC: 9 MG/DL (ref 8.5–10.1)
CHLORIDE SERPL-SCNC: 101 MMOL/L (ref 100–111)
CHLORIDE SERPL-SCNC: 101 MMOL/L (ref 100–111)
CO2 SERPL-SCNC: 33 MMOL/L (ref 21–32)
CO2 SERPL-SCNC: 35 MMOL/L (ref 21–32)
CREAT SERPL-MCNC: 1.18 MG/DL (ref 0.6–1.3)
CREAT SERPL-MCNC: 1.25 MG/DL (ref 0.6–1.3)
DIFFERENTIAL METHOD BLD: ABNORMAL
EOSINOPHIL # BLD: 0.1 K/UL (ref 0–0.4)
EOSINOPHIL NFR BLD: 1 % (ref 0–5)
ERYTHROCYTE [DISTWIDTH] IN BLOOD BY AUTOMATED COUNT: 23.6 % (ref 11.6–14.5)
GLUCOSE BLD STRIP.AUTO-MCNC: 113 MG/DL (ref 70–110)
GLUCOSE SERPL-MCNC: 146 MG/DL (ref 74–99)
GLUCOSE SERPL-MCNC: 167 MG/DL (ref 74–99)
HCT VFR BLD AUTO: 27.3 % (ref 35–45)
HGB BLD-MCNC: 7.5 G/DL (ref 12–16)
IMM GRANULOCYTES # BLD AUTO: 0.4 K/UL (ref 0–0.04)
IMM GRANULOCYTES NFR BLD AUTO: 3 % (ref 0–0.5)
LYMPHOCYTES # BLD: 1.5 K/UL (ref 0.9–3.6)
LYMPHOCYTES NFR BLD: 12 % (ref 21–52)
MCH RBC QN AUTO: 20.7 PG (ref 24–34)
MCHC RBC AUTO-ENTMCNC: 27.5 G/DL (ref 31–37)
MCV RBC AUTO: 75.4 FL (ref 78–100)
MONOCYTES # BLD: 0.7 K/UL (ref 0.05–1.2)
MONOCYTES NFR BLD: 5 % (ref 3–10)
NEUTS SEG # BLD: 9.9 K/UL (ref 1.8–8)
NEUTS SEG NFR BLD: 79 % (ref 40–73)
NRBC # BLD: 0.07 K/UL (ref 0–0.01)
NRBC BLD-RTO: 0.6 PER 100 WBC
PLATELET # BLD AUTO: 354 K/UL (ref 135–420)
PMV BLD AUTO: 10.9 FL (ref 9.2–11.8)
POTASSIUM SERPL-SCNC: 3.3 MMOL/L (ref 3.5–5.5)
POTASSIUM SERPL-SCNC: 3.5 MMOL/L (ref 3.5–5.5)
RBC # BLD AUTO: 3.62 M/UL (ref 4.2–5.3)
SODIUM SERPL-SCNC: 141 MMOL/L (ref 136–145)
SODIUM SERPL-SCNC: 141 MMOL/L (ref 136–145)
T4 SERPL-MCNC: 3.3 UG/DL (ref 4.8–13.9)
TSH SERPL DL<=0.05 MIU/L-ACNC: 32 UIU/ML (ref 0.36–3.74)
WBC # BLD AUTO: 12.5 K/UL (ref 4.6–13.2)

## 2024-03-25 PROCEDURE — 84436 ASSAY OF TOTAL THYROXINE: CPT

## 2024-03-25 PROCEDURE — A4216 STERILE WATER/SALINE, 10 ML: HCPCS | Performed by: NURSE PRACTITIONER

## 2024-03-25 PROCEDURE — 82962 GLUCOSE BLOOD TEST: CPT

## 2024-03-25 PROCEDURE — 2580000003 HC RX 258: Performed by: INTERNAL MEDICINE

## 2024-03-25 PROCEDURE — 2700000000 HC OXYGEN THERAPY PER DAY

## 2024-03-25 PROCEDURE — 85025 COMPLETE CBC W/AUTO DIFF WBC: CPT

## 2024-03-25 PROCEDURE — 99239 HOSP IP/OBS DSCHRG MGMT >30: CPT | Performed by: INTERNAL MEDICINE

## 2024-03-25 PROCEDURE — 6360000002 HC RX W HCPCS: Performed by: STUDENT IN AN ORGANIZED HEALTH CARE EDUCATION/TRAINING PROGRAM

## 2024-03-25 PROCEDURE — 2500000003 HC RX 250 WO HCPCS: Performed by: NURSE PRACTITIONER

## 2024-03-25 PROCEDURE — 6370000000 HC RX 637 (ALT 250 FOR IP): Performed by: NURSE PRACTITIONER

## 2024-03-25 PROCEDURE — 6370000000 HC RX 637 (ALT 250 FOR IP): Performed by: INTERNAL MEDICINE

## 2024-03-25 PROCEDURE — 36415 COLL VENOUS BLD VENIPUNCTURE: CPT

## 2024-03-25 PROCEDURE — 97116 GAIT TRAINING THERAPY: CPT

## 2024-03-25 PROCEDURE — 80048 BASIC METABOLIC PNL TOTAL CA: CPT

## 2024-03-25 PROCEDURE — 84443 ASSAY THYROID STIM HORMONE: CPT

## 2024-03-25 PROCEDURE — 6360000002 HC RX W HCPCS: Performed by: INTERNAL MEDICINE

## 2024-03-25 PROCEDURE — 2580000003 HC RX 258: Performed by: STUDENT IN AN ORGANIZED HEALTH CARE EDUCATION/TRAINING PROGRAM

## 2024-03-25 PROCEDURE — 94761 N-INVAS EAR/PLS OXIMETRY MLT: CPT

## 2024-03-25 PROCEDURE — 2580000003 HC RX 258: Performed by: NURSE PRACTITIONER

## 2024-03-25 RX ORDER — PANTOPRAZOLE SODIUM 40 MG/1
40 TABLET, DELAYED RELEASE ORAL
Qty: 30 TABLET | Refills: 3 | Status: SHIPPED | OUTPATIENT
Start: 2024-03-26

## 2024-03-25 RX ORDER — CARVEDILOL 6.25 MG/1
6.25 TABLET ORAL 2 TIMES DAILY WITH MEALS
Qty: 60 TABLET | Refills: 3 | Status: SHIPPED | OUTPATIENT
Start: 2024-03-25

## 2024-03-25 RX ORDER — AMOXICILLIN AND CLAVULANATE POTASSIUM 875; 125 MG/1; MG/1
1 TABLET, FILM COATED ORAL EVERY 12 HOURS SCHEDULED
Qty: 13 TABLET | Refills: 0 | Status: SHIPPED | OUTPATIENT
Start: 2024-03-25 | End: 2024-04-01

## 2024-03-25 RX ORDER — POTASSIUM CHLORIDE 20 MEQ/1
20 TABLET, EXTENDED RELEASE ORAL DAILY
Qty: 60 TABLET | Refills: 1 | Status: SHIPPED | OUTPATIENT
Start: 2024-03-25

## 2024-03-25 RX ORDER — LEVOTHYROXINE SODIUM 0.1 MG/1
200 TABLET ORAL DAILY
Status: DISCONTINUED | OUTPATIENT
Start: 2024-03-25 | End: 2024-03-25 | Stop reason: HOSPADM

## 2024-03-25 RX ORDER — LOSARTAN POTASSIUM 25 MG/1
50 TABLET ORAL DAILY
Status: DISCONTINUED | OUTPATIENT
Start: 2024-03-25 | End: 2024-03-25 | Stop reason: HOSPADM

## 2024-03-25 RX ORDER — POLYETHYLENE GLYCOL 3350 17 G/17G
17 POWDER, FOR SOLUTION ORAL DAILY
Qty: 1530 G | Refills: 1 | Status: SHIPPED | OUTPATIENT
Start: 2024-03-25 | End: 2024-04-24

## 2024-03-25 RX ORDER — FUROSEMIDE 20 MG/1
20 TABLET ORAL 2 TIMES DAILY
Qty: 60 TABLET | Refills: 3 | Status: SHIPPED | OUTPATIENT
Start: 2024-03-25

## 2024-03-25 RX ORDER — LIOTHYRONINE SODIUM 5 UG/1
5 TABLET ORAL 2 TIMES DAILY
Qty: 30 TABLET | Refills: 3 | Status: SHIPPED | OUTPATIENT
Start: 2024-03-25

## 2024-03-25 RX ORDER — FERROUS SULFATE 325(65) MG
325 TABLET ORAL
Qty: 30 TABLET | Refills: 3 | Status: SHIPPED | OUTPATIENT
Start: 2024-03-26

## 2024-03-25 RX ORDER — FUROSEMIDE 20 MG/1
20 TABLET ORAL 2 TIMES DAILY
Status: DISCONTINUED | OUTPATIENT
Start: 2024-03-25 | End: 2024-03-25 | Stop reason: HOSPADM

## 2024-03-25 RX ORDER — LEVOTHYROXINE SODIUM 0.2 MG/1
200 TABLET ORAL DAILY
Qty: 30 TABLET | Refills: 3 | Status: SHIPPED | OUTPATIENT
Start: 2024-03-25

## 2024-03-25 RX ORDER — AMOXICILLIN AND CLAVULANATE POTASSIUM 875; 125 MG/1; MG/1
1 TABLET, FILM COATED ORAL EVERY 12 HOURS SCHEDULED
Status: DISCONTINUED | OUTPATIENT
Start: 2024-03-25 | End: 2024-03-25 | Stop reason: HOSPADM

## 2024-03-25 RX ORDER — LOSARTAN POTASSIUM 50 MG/1
50 TABLET ORAL DAILY
Qty: 30 TABLET | Refills: 3 | Status: SHIPPED | OUTPATIENT
Start: 2024-03-25

## 2024-03-25 RX ORDER — FERROUS SULFATE 325(65) MG
325 TABLET ORAL
Status: DISCONTINUED | OUTPATIENT
Start: 2024-03-26 | End: 2024-03-25 | Stop reason: HOSPADM

## 2024-03-25 RX ADMIN — AMOXICILLIN AND CLAVULANATE POTASSIUM 1 TABLET: 875; 125 TABLET, FILM COATED ORAL at 10:16

## 2024-03-25 RX ADMIN — HYDROCORTISONE SODIUM SUCCINATE 50 MG: 100 INJECTION, POWDER, FOR SOLUTION INTRAMUSCULAR; INTRAVENOUS at 08:36

## 2024-03-25 RX ADMIN — LIOTHYRONINE SODIUM 5 MCG: 5 TABLET ORAL at 08:37

## 2024-03-25 RX ADMIN — PIPERACILLIN AND TAZOBACTAM 4500 MG: 4; .5 INJECTION, POWDER, FOR SOLUTION INTRAVENOUS at 05:03

## 2024-03-25 RX ADMIN — LEVOTHYROXINE SODIUM ANHYDROUS 200 MCG: 100 INJECTION, POWDER, LYOPHILIZED, FOR SOLUTION INTRAVENOUS at 08:36

## 2024-03-25 RX ADMIN — PANTOPRAZOLE SODIUM 40 MG: 40 TABLET, DELAYED RELEASE ORAL at 08:37

## 2024-03-25 RX ADMIN — ENOXAPARIN SODIUM 30 MG: 100 INJECTION SUBCUTANEOUS at 08:36

## 2024-03-25 RX ADMIN — SODIUM CHLORIDE 125 MG: 9 INJECTION, SOLUTION INTRAVENOUS at 10:01

## 2024-03-25 RX ADMIN — CARVEDILOL 6.25 MG: 6.25 TABLET, FILM COATED ORAL at 08:37

## 2024-03-25 RX ADMIN — FUROSEMIDE 20 MG: 10 INJECTION, SOLUTION INTRAMUSCULAR; INTRAVENOUS at 08:36

## 2024-03-25 ASSESSMENT — PAIN SCALES - GENERAL: PAINLEVEL_OUTOF10: 0

## 2024-03-25 NOTE — PLAN OF CARE
Problem: Physical Therapy - Adult  Goal: By Discharge: Performs mobility at highest level of function for planned discharge setting.  See evaluation for individualized goals.  Description: Initiated  3/23/2024  to be met within 7-10 days.    1.  Patient will move from supine to sit and sit to supine , scoot up and down, and roll side to side in bed with modified independence.    2.  Patient will transfer from bed to chair and chair to bed with modified independence using the least restrictive device.  3.  Patient will perform sit to stand with modified independence.  4.  Patient will ambulate with modified independence for 150 feet with the least restrictive device.   5.  Patient will ascend/descend 3 stairs with ISAMAR handrail(s) with modified independence.    PLOF: Pt lives in two story apartment with family, 3 ISAMAR with handrails.  Pt is independent with mobility, has cane that she uses intermittently.     Outcome: Progressing   PHYSICAL THERAPY TREATMENT    Patient: Aditya Gusman (33 y.o. female)  Date: 3/25/2024  Diagnosis: Acute respiratory failure (HCC) [J96.00]  Generalized abdominal pain [R10.84]  Leukocytosis, unspecified type [D72.829] Acute respiratory failure (HCC)      Precautions: General Precautions, Fall Risk,  ,  ,  ,  ,  ,  ,    ASSESSMENT:  Received lying in bed with father present. Agreeable to PT upon arrival. Endorses mild pain to B LE's and stomach. O2 monitored t/o session (see below). Sit > stand to RW SBA. Demos good static standing balance. Ambulates room <> hallway (75 ft) using RW and SBA. Demos increased AROLDO with decreased gait speed during ambulation. Is able to maintain conversation t/o gait training session without increase in symptoms. Returned EOB and sit > supine SBA following ambulation. Denies SOB. Left lying in bed with HOB elevated and in NAD following mobility. Educated on need for RN assistance with mobility; verbalized understanding. Call bell in reach.     Prior to

## 2024-03-25 NOTE — DISCHARGE SUMMARY
tablet  Commonly known as: TYLENOL     levothyroxine 200 MCG tablet  Commonly known as: SYNTHROID  Take 1 tablet by mouth Daily     norethindrone 0.35 MG tablet  Commonly known as: MICRONOR            STOP taking these medications      cloNIDine 0.1 MG tablet  Commonly known as: CATAPRES     ibuprofen 600 MG tablet  Commonly known as: ADVIL;MOTRIN               Where to Get Your Medications        These medications were sent to [id number] - [place], [place] - [address] - P [phone] - F [phone]  [address] [address]      Phone: [phone]   amoxicillin-clavulanate 875-125 MG per tablet  carvedilol 6.25 MG tablet  ferrous sulfate 325 (65 Fe) MG tablet  furosemide 20 MG tablet  levothyroxine 200 MCG tablet  liothyronine 5 MCG tablet  losartan 50 MG tablet  pantoprazole 40 MG tablet  polyethylene glycol 17 GM/SCOOP powder  potassium chloride 20 MEQ extended release tablet             My Recommended Diet, Activity, Wound Care, and follow-up labs are listed in the patient's Discharge Insturctions which I have personally completed and reviewed.      Disposition:     [] Home with family     [x]  PT/RN   [] SNF/NH   [] Inpatient Rehab/MICHAEL  Condition at Discharge:  Stable    Follow up with:   PCP : Clayton King MD      >30 minutes spent coordinating this discharge (review instructions/follow-up, prescriptions, preparing report for sign off)    Signed:  Merissa Beasley MD  3/25/2024  2:54 PM

## 2024-03-25 NOTE — CARE COORDINATION
3/25/24    CM reviewed chart.   Patient cleared for discharge.  CM called and spoke with mother Bettie Gusman @ [phone] to follow up on star rated FOC list sent to mother.   Mother chose At home care, [facility], [facility].    CM explained to mother Medicaid is still pending and if patient could afford out of pocket expense. Mother replied \"no\".     CM called Екатерина [facility]inn[facility] liaison @ [phone] and inquired if [facility] could see patient as indigent. Екатерина Linn will follow up with patient.     Nakita Blake  Case Management  [phone] office

## 2024-03-25 NOTE — PLAN OF CARE
32 Y/o female full code,   Arrived 03/21/24 lethargic, abdominal pain, pneumonia, respiratory failure UTI, methamphetamine abuse    Hx: depression hypothyroidism, HTN, seizures, methamphetamine abuse, suicidal ideation, anxiety,   Allergy: Wellbutrin    Neuro alert and oriented X 4    Resp was intubated on 03/21/24 (Her CO2 was >110) extubated next day 03/22/24 wears CPAP when sleeping and 3 LPM NC when awake here, noncompliant with CPAP at home    Cardiac edema all over, lower extremities +4 bilaterally, upper extremities 2+, eyes (periorbital 1+) Prophylaxis DVTs is Lovenox  Gi ulcer prophylaxis Pepcid regular diet Blood glucose overnight 127  Gu pure wick, incontinent of huge bowel movements in bed, states \"I can't feel it\"  Skin from knees down red and cracked, excoriated, edema generalized  Lines right AC midline 20 G left AC    2200 huge bowel movement in bed incontinent, stated \"I didn't feel it\" bathed and all linen changed                      Problem: Discharge Planning  Goal: Discharge to home or other facility with appropriate resources  Outcome: Progressing     Problem: Pain  Goal: Verbalizes/displays adequate comfort level or baseline comfort level  Outcome: Progressing     Problem: Safety - Medical Restraint  Goal: Remains free of injury from restraints (Restraint for Interference with Medical Device)  Outcome: Progressing     Problem: Skin/Tissue Integrity  Goal: Absence of new skin breakdown  Outcome: Progressing     Problem: Safety - Adult  Goal: Free from fall injury  Outcome: Progressing     Problem: Nutrition Deficit:  Goal: Optimize nutritional status  Outcome: Progressing

## 2024-03-25 NOTE — HOME CARE
Received home health referral for HC for the following disciplines  (SN / PT / OT).   Pending [facility].  Called and spoke with parent - Explained home health services; cost.  Answered questions to this writers scope of practice.  Parent Ms Alexandre verbalized of wanting to have [facility] for services.  Demographics has been verified.  Orders noted and arranged and sent to central intake and scheduling.      ---   JD Vaughn [facility] Liaison

## 2024-03-25 NOTE — CARE COORDINATION
3/25/24    CM verified with mom Bettie Gusman vis phone @ [phone] if patient has a CPAP @ home and she does. Mom verbalizes \" It is broken and may have been infested by roaches\". CM encouraged mom to call the [employer] company that supplied the CPAP machine and request for a new device or repair. Mom verbalizes she will follow up.    CM notified PORFIRIO Kennedy Of CPAP concerns via [employer].      CM verified with patient and ICU staff of discharge ride.       Nakita Blake  Care Management  [phone] office.

## 2024-03-26 ENCOUNTER — CLINICAL DOCUMENTATION (OUTPATIENT)
Age: 34
End: 2024-03-26

## 2024-03-26 ENCOUNTER — TELEPHONE (OUTPATIENT)
Facility: CLINIC | Age: 34
End: 2024-03-26

## 2024-03-26 NOTE — PROGRESS NOTES
PATIENT REQUIRES TCM OUTREACH    MRN: [id number]     PATIENT:  Aditya Gusman    ADMIT DATE:  3/21/24    DISCHARGE DATE:  3/25/24     ADMITTING DX: abdominal pain, respiratory failure requiring ventilator    MEDICATION CHANGES:   Start: Augmentin, Coreg, Iron, Cytomel, Cozaar, Protonix, Glycolax, Klor-Con  Stop: Catapres, Advil  Change: Lasix    SPECIALISTS:  Pulm and Endocrine    HOME HEALTH/WOUND CARE/PT/OT:  none    SCHEDULED FOLLOW UP APPTS:  No future appointments.    *Please contact patient within 2 business days and document under .TCMOFFICE.  A scheduled Hospital Follow-up for patient within 7 days is preferred*

## 2024-03-26 NOTE — TELEPHONE ENCOUNTER
Care Transitions Initial Follow Up Call    Outreach made within 2 business days of discharge: Yes    Patient: Aditya Gusman Patient : 1990   MRN: [id number]  Reason for Admission: There are no discharge diagnoses documented for the most recent discharge.  Discharge Date: 3/25/24       Spoke with: Bettie Gaspar    Discharge department/facility: [facility]    TCM Interactive Patient Contact:  Was patient able to fill all prescriptions: Yes  Was patient instructed to bring all medications to the follow-up visit: Yes  Is patient taking all medications as directed in the discharge summary? Yes  Does patient understand their discharge instructions: Yes  Does patient have questions or concerns that need addressed prior to 7-14 day follow up office visit: yes - Yes    Scheduled appointment with PCP within 7-14 days    Follow Up  Scheduled for 3/28/24 @ 10:15am w Dr elbert MULLER, [facility]

## 2024-03-27 ENCOUNTER — HOME CARE VISIT (OUTPATIENT)
Age: 34
End: 2024-03-27

## 2024-03-27 LAB
BACTERIA SPEC CULT: NORMAL
BACTERIA SPEC CULT: NORMAL
SERVICE CMNT-IMP: NORMAL
SERVICE CMNT-IMP: NORMAL

## 2024-03-27 PROCEDURE — G0299 HHS/HOSPICE OF RN EA 15 MIN: HCPCS

## 2024-03-27 ASSESSMENT — ENCOUNTER SYMPTOMS
HEMOPTYSIS: 0
DYSPNEA ACTIVITY LEVEL: AFTER AMBULATING 10 - 20 FT

## 2024-03-28 ENCOUNTER — HOME CARE VISIT (OUTPATIENT)
Age: 34
End: 2024-03-28

## 2024-03-28 VITALS
RESPIRATION RATE: 18 BRPM | OXYGEN SATURATION: 99 % | TEMPERATURE: 98 F | SYSTOLIC BLOOD PRESSURE: 158 MMHG | HEART RATE: 76 BPM | DIASTOLIC BLOOD PRESSURE: 86 MMHG

## 2024-03-28 PROCEDURE — G0151 HHCP-SERV OF PT,EA 15 MIN: HCPCS

## 2024-03-28 ASSESSMENT — ENCOUNTER SYMPTOMS: PAIN LOCATION - PAIN QUALITY: DULL ACHE

## 2024-03-28 NOTE — HOME HEALTH
medication teaching regarding anticoagulants, hyperglycemic agents or opoid narcotics performed (specify): N/A.    MD notified of any discrepancies/look a like medications/medication interactions N/A    Home health supplies by type and quantity ordered/delivered this visit include: N/A    Patient education provided this visit to include:  Discussed intervention to prevent infection; hand washing or using hand  when touching contaminated items and avoiding sick person.  Ambulate q 2hrs as tolerated, safety and fall prec; clearing walkway. Avoid getting up too quickly when feeling weak, dizzy, and to call for assistance prn. Pacing activity/energy conservation; rest in bet activity and deep breathing exercises. Continue to follow heart healthy diet. Reviewed heart healthy diet; avoiding foods with high in Na contents. Elevating  BLE, apply daily moisturizing cream to prevent infection. Monitor for S/S of infection; fever 100.4, increase pain, redness, swelling, coughing with yellow thick sputum, cloudy urine with foul smell, not feeling well 2-3 days, SOB, and to call [facility] or MD for assistance if experiencing any of these S/S. To call 911 with chest pains, facial drooping, difficulty talking, non arousable/unconscious and uncontrollable bleeding.   Patient level of understanding of education provided: Pt has good understanding of the teaching provided during visit.  Sharps Education Provided: N/A  Home exercise program/Homework provided: Ambulation, HEP deep breathing exercises 10x when having SOB, pain and anxiety, IS 10x q 1-2 hrs.   Pt/Caregiver instructed on plan of care and are agreeable to plan of care at this time.  Functional Mobility:  Bed Mobility (rolling/scooting): Independent  Transfers (supine to chair and return to supine): Supervision or Touching Assistance.  Patient uses device: no  Gait:  Ambulates at the modified independent level and with supervision using no equipment  Safety concerns 
low self-esteem

## 2024-03-29 ENCOUNTER — HOME CARE VISIT (OUTPATIENT)
Age: 34
End: 2024-03-29

## 2024-03-29 VITALS
SYSTOLIC BLOOD PRESSURE: 150 MMHG | HEART RATE: 87 BPM | DIASTOLIC BLOOD PRESSURE: 80 MMHG | TEMPERATURE: 96.6 F | OXYGEN SATURATION: 98 %

## 2024-03-29 NOTE — HOME HEALTH
HPI:    Per patient and her mother, the patient had not been keeping or making medical appointments, and has hypothyroid.  She became critically ill:  \"Patient is a 33 y.o. female with PMH depression, HTN, reported epilepsy but not on AED per chart review, thyroid disease and meth amphetamine abuse who presented with 1 day of generalized abdominal pain and nausea. CT abd/pelvis without abnormalities other than hepatic steatosis.    In ED was noted to be profoundly lethargic, falling asleep. ABG showed acute resp acidosis. Pt placed on bipap, acidosis worsened to pH 7.0 and CO2 unreadably high so she was intubated. UDS + meth.    CT chest with MF PNA and bibasilar atelectasis. WBC 15- abx started in ED. Hemodynamically stable.\"      Patient was hospitalized at [facility] from 3/21/2024 - 3/25/2024, including ICU.  .  Prior Medical History:   Depression -3/1/2017   Endocrine disease -Hypothyroidism   Hypertension   Seizures - epilepsy   Thyroid disease   .  Subjective:  Patient presents sitting up in her home, reports she becomes very fatigued with activity and has difficulty accessing her 2nd floor bathroom.    Living /Home Situation: She lives with her parents in a 2 story home, all bathrooms on 2nd floor and bedroom on 2nd floor.  There are 3 steps to enter the home without handrail.  Interior has 15 steps to 2nd floor, R ascending handrail.  .  Caregiver involvement:   Her mother is assisting her with ADLs, meals, meds, coordination of care and transportation.    PT Evaluation:  Strength:  Functional strength assessed using FTSST:  14.6 seconds.   MMT BLEs:  B hips and knees 4-/5, B ankles 4/5, see PT ROM fir details.  ROM:   BLE WFL  Transfers:   Requires hand support, SBA fr safety due to fall risk, poor activity tolerance.  Gait:  AD:  Not using AD, Requires close supervision for safety.  Unable to walk outside safely at this time.   Gait pattern: Increased AROLDO, unsteady, B foot drag and arms wide, flexed posture.

## 2024-04-03 ENCOUNTER — HOME CARE VISIT (OUTPATIENT)
Age: 34
End: 2024-04-03

## 2024-04-03 PROCEDURE — G0157 HHC PT ASSISTANT EA 15: HCPCS

## 2024-04-04 ENCOUNTER — HOME CARE VISIT (OUTPATIENT)
Age: 34
End: 2024-04-04

## 2024-04-05 ENCOUNTER — HOME CARE VISIT (OUTPATIENT)
Age: 34
End: 2024-04-05

## 2024-04-05 PROCEDURE — G0157 HHC PT ASSISTANT EA 15: HCPCS

## 2024-04-05 NOTE — CASE COMMUNICATION
Attempted to contact patient several times via, text, calls, voicemail. I was not able to reach her. Patient was not seen on today

## 2024-04-08 ENCOUNTER — APPOINTMENT (OUTPATIENT)
Facility: HOSPITAL | Age: 34
End: 2024-04-08
Payer: MEDICAID

## 2024-04-08 ENCOUNTER — HOSPITAL ENCOUNTER (EMERGENCY)
Facility: HOSPITAL | Age: 34
Discharge: HOME | End: 2024-04-08
Attending: EMERGENCY MEDICINE
Payer: MEDICAID

## 2024-04-08 VITALS
WEIGHT: 293 LBS | OXYGEN SATURATION: 99 % | HEART RATE: 95 BPM | RESPIRATION RATE: 17 BRPM | BODY MASS INDEX: 55.32 KG/M2 | TEMPERATURE: 97 F | HEIGHT: 61 IN | DIASTOLIC BLOOD PRESSURE: 97 MMHG | SYSTOLIC BLOOD PRESSURE: 164 MMHG

## 2024-04-08 DIAGNOSIS — E87.6 HYPOKALEMIA: ICD-10-CM

## 2024-04-08 DIAGNOSIS — R06.02 SHORTNESS OF BREATH: Primary | ICD-10-CM

## 2024-04-08 LAB
ALBUMIN SERPL-MCNC: 3.2 G/DL (ref 3.4–5)
ALBUMIN/GLOB SERPL: 0.8 (ref 0.8–1.7)
ALP SERPL-CCNC: 83 U/L (ref 45–117)
ALT SERPL-CCNC: 20 U/L (ref 13–56)
ANION GAP SERPL CALC-SCNC: 6 MMOL/L (ref 3–18)
AST SERPL-CCNC: 13 U/L (ref 10–38)
BASOPHILS # BLD: 0 K/UL (ref 0–0.1)
BASOPHILS NFR BLD: 1 % (ref 0–2)
BILIRUB SERPL-MCNC: 0.5 MG/DL (ref 0.2–1)
BUN SERPL-MCNC: 12 MG/DL (ref 7–18)
BUN/CREAT SERPL: 13 (ref 12–20)
CALCIUM SERPL-MCNC: 9.1 MG/DL (ref 8.5–10.1)
CHLORIDE SERPL-SCNC: 106 MMOL/L (ref 100–111)
CO2 SERPL-SCNC: 29 MMOL/L (ref 21–32)
CREAT SERPL-MCNC: 0.96 MG/DL (ref 0.6–1.3)
DIFFERENTIAL METHOD BLD: ABNORMAL
EOSINOPHIL # BLD: 0.2 K/UL (ref 0–0.4)
EOSINOPHIL NFR BLD: 2 % (ref 0–5)
ERYTHROCYTE [DISTWIDTH] IN BLOOD BY AUTOMATED COUNT: 30.1 % (ref 11.6–14.5)
GLOBULIN SER CALC-MCNC: 3.8 G/DL (ref 2–4)
GLUCOSE SERPL-MCNC: 131 MG/DL (ref 74–99)
HCT VFR BLD AUTO: 30.4 % (ref 35–45)
HGB BLD-MCNC: 8.5 G/DL (ref 12–16)
IMM GRANULOCYTES # BLD AUTO: 0 K/UL (ref 0–0.04)
IMM GRANULOCYTES NFR BLD AUTO: 0 % (ref 0–0.5)
LIPASE SERPL-CCNC: 17 U/L (ref 13–75)
LYMPHOCYTES # BLD: 1.8 K/UL (ref 0.9–3.6)
LYMPHOCYTES NFR BLD: 25 % (ref 21–52)
MAGNESIUM SERPL-MCNC: 2.2 MG/DL (ref 1.6–2.6)
MCH RBC QN AUTO: 22.8 PG (ref 24–34)
MCHC RBC AUTO-ENTMCNC: 28 G/DL (ref 31–37)
MCV RBC AUTO: 81.5 FL (ref 78–100)
MONOCYTES # BLD: 0.6 K/UL (ref 0.05–1.2)
MONOCYTES NFR BLD: 9 % (ref 3–10)
NEUTS SEG # BLD: 4.4 K/UL (ref 1.8–8)
NEUTS SEG NFR BLD: 63 % (ref 40–73)
NRBC # BLD: 0 K/UL (ref 0–0.01)
NRBC BLD-RTO: 0 PER 100 WBC
NT PRO BNP: 215 PG/ML (ref 0–450)
PLATELET # BLD AUTO: 329 K/UL (ref 135–420)
PMV BLD AUTO: 10.7 FL (ref 9.2–11.8)
POTASSIUM SERPL-SCNC: 3.3 MMOL/L (ref 3.5–5.5)
PROT SERPL-MCNC: 7 G/DL (ref 6.4–8.2)
RBC # BLD AUTO: 3.73 M/UL (ref 4.2–5.3)
SODIUM SERPL-SCNC: 141 MMOL/L (ref 136–145)
TROPONIN I SERPL HS-MCNC: 18 NG/L (ref 0–54)
WBC # BLD AUTO: 7 K/UL (ref 4.6–13.2)

## 2024-04-08 PROCEDURE — 83690 ASSAY OF LIPASE: CPT

## 2024-04-08 PROCEDURE — 85025 COMPLETE CBC W/AUTO DIFF WBC: CPT

## 2024-04-08 PROCEDURE — 83735 ASSAY OF MAGNESIUM: CPT

## 2024-04-08 PROCEDURE — 83880 ASSAY OF NATRIURETIC PEPTIDE: CPT

## 2024-04-08 PROCEDURE — 80053 COMPREHEN METABOLIC PANEL: CPT

## 2024-04-08 PROCEDURE — 84484 ASSAY OF TROPONIN QUANT: CPT

## 2024-04-08 PROCEDURE — 99284 EMERGENCY DEPT VISIT MOD MDM: CPT

## 2024-04-08 PROCEDURE — 71045 X-RAY EXAM CHEST 1 VIEW: CPT

## 2024-04-08 RX ORDER — POTASSIUM CHLORIDE 20 MEQ/1
40 TABLET, EXTENDED RELEASE ORAL ONCE
Status: DISCONTINUED | OUTPATIENT
Start: 2024-04-08 | End: 2024-04-08 | Stop reason: HOSPADM

## 2024-04-08 ASSESSMENT — PAIN SCALES - GENERAL: PAINLEVEL_OUTOF10: 6

## 2024-04-08 ASSESSMENT — PAIN - FUNCTIONAL ASSESSMENT: PAIN_FUNCTIONAL_ASSESSMENT: 0-10

## 2024-04-08 ASSESSMENT — PAIN DESCRIPTION - ORIENTATION: ORIENTATION: LOWER;RIGHT;LEFT

## 2024-04-08 ASSESSMENT — PAIN DESCRIPTION - LOCATION: LOCATION: LEG

## 2024-04-08 ASSESSMENT — PAIN DESCRIPTION - DESCRIPTORS: DESCRIPTORS: BURNING

## 2024-04-08 NOTE — ED TRIAGE NOTES
Patient comes in by EMS for complaints of shortness of breath. Patient was recently discharged for pneumonia.

## 2024-04-08 NOTE — ED PROVIDER NOTES
Take 2 tablets by mouth every 6 hours as needed    CARVEDILOL (COREG) 6.25 MG TABLET    Take 1 tablet by mouth 2 times daily (with meals)    FERROUS SULFATE (IRON 325) 325 (65 FE) MG TABLET    Take 1 tablet by mouth daily (with breakfast)    FUROSEMIDE (LASIX) 20 MG TABLET    Take 1 tablet by mouth in the morning and 1 tablet in the evening.    LEVOTHYROXINE (SYNTHROID) 200 MCG TABLET    Take 1 tablet by mouth Daily    LIOTHYRONINE (CYTOMEL) 5 MCG TABLET    Take 1 tablet by mouth 2 times daily at 0800 and 1400    LOSARTAN (COZAAR) 50 MG TABLET    Take 1 tablet by mouth daily    NORETHINDRONE (MICRONOR) 0.35 MG TABLET    1 tablet    PANTOPRAZOLE (PROTONIX) 40 MG TABLET    Take 1 tablet by mouth every morning (before breakfast)    POLYETHYLENE GLYCOL (GLYCOLAX) 17 GM/SCOOP POWDER    Take 17 g by mouth daily    POTASSIUM CHLORIDE (KLOR-CON M) 20 MEQ EXTENDED RELEASE TABLET    Take 1 tablet by mouth daily       ALLERGIES     Bupropion    FAMILY HISTORY       Family History   Problem Relation Age of Onset    Diabetes Other         great grandmother    Thyroid Disease Other         great grandmother    Hypertension Other         family history          SOCIAL HISTORY       Social History     Socioeconomic History    Marital status: Single   Tobacco Use    Smoking status: Former     Current packs/day: 0.50     Types: Cigarettes    Smokeless tobacco: Never   Substance and Sexual Activity    Alcohol use: Yes     Alcohol/week: 0.0 standard drinks of alcohol    Drug use: No     Social Determinants of Health     Food Insecurity: Patient Unable To Answer (3/21/2024)    Hunger Vital Sign     Worried About Running Out of Food in the Last Year: Patient unable to answer     Ran Out of Food in the Last Year: Patient unable to answer   Transportation Needs: No Transportation Needs (3/27/2024)    OASIS : Transportation     Lack of Transportation (Medical): No     Lack of Transportation (Non-Medical): No     Patient Unable or

## 2024-04-08 NOTE — DISCHARGE INSTRUCTIONS
Left message for mom Maribell to call office   Your evaluation today is reassuring.  Please call your doctor tomorrow regarding an expedited appointment this week.  Be sure to take all medications as directed.  Increase your intake of potassium rich foods.  Return to the emergency department for any acute concerns

## 2024-04-09 ENCOUNTER — HOME CARE VISIT (OUTPATIENT)
Age: 34
End: 2024-04-09

## 2024-04-09 PROCEDURE — G0157 HHC PT ASSISTANT EA 15: HCPCS

## 2024-04-10 ENCOUNTER — HOME CARE VISIT (OUTPATIENT)
Age: 34
End: 2024-04-10

## 2024-04-11 ASSESSMENT — ENCOUNTER SYMPTOMS: PAIN LOCATION - PAIN QUALITY: ACHE

## 2024-04-11 NOTE — HOME HEALTH
SUBJECTIVE: Pt reports she gets down alot not knowing why she swells so much.  Pt reports she has been to ER a few times for edema in LEs.  CAREGIVER INVOLVEMENT/ASSISTANCE NEEDED FOR: pts mother, brother and grandmother assist pt with all needs prn and pts grandmother present during PT session.  .  OBJECTIVE:  See interventions.  PATIENT EDUCATION PROVIDED THIS VISIT: Recommend pt elevating LEs when not active to decrease LE edema and to perform LE exercises 2x/day.  pt to amb in home every waking hour with SC increasing distance as tolerated.  PATIENT RESPONSE TO EDUCATION PROVIDED: Pt reports she knows she needs to start doing her HEP and will try to start doing better.  PATIENT RESPONSE TO TREATMENT: Pt easily distracted requiring frequent re direction and requires frequent rest breaks.    .  ASSESSMENT OF PROGRESS TOWARD GOALS: Pt has not been available for PT session x 2 attempts and was very distracted during todays PT session reporting she had to  her son from school secondary to having a headache.  Reviewed with pt importance of compliance with HEP and being able to see pt consistently to be able to continue PT.  Pt reports she wants to try PT again.  Pt did amb outdoors with SC on unlevel surfaces to car and down steps with minimal difficulty today.  Pt verbalizes a good understanding of HEP and reviewed with pt importance of compliance.  Pt demonstrates improve gait balance as evidence by Tinetti score increasing from 8/28 initially to 18/28 today.  .  PLAN FOR NEXT VISIT: Will plan to progress amb as tolerated.  THE FOLLOWING DISCHARGE PLANNING WAS DISCUSSED WITH THE PATIENT/CAREGIVER: Will attempt PT again 1 more visit this week.

## 2024-04-12 ENCOUNTER — HOME CARE VISIT (OUTPATIENT)
Age: 34
End: 2024-04-12

## 2024-04-12 PROCEDURE — G0151 HHCP-SERV OF PT,EA 15 MIN: HCPCS

## 2024-04-13 VITALS
SYSTOLIC BLOOD PRESSURE: 145 MMHG | DIASTOLIC BLOOD PRESSURE: 89 MMHG | OXYGEN SATURATION: 99 % | TEMPERATURE: 97.7 F | RESPIRATION RATE: 20 BRPM | HEART RATE: 101 BPM

## 2024-04-13 NOTE — HOME HEALTH
is able to exit the kathleen safely, but has not yet been assessed fully for all the outdoor surfaces and distance.  Balance per Tinetti test is improved from initial 20/28, decline to 18/28, and today scored a much improved 28/28; from a medium or high fall risk to a low fall risk with goal met.  She reports her edema is improving.      CONTINUED NEED FOR THE FOLLOWING SKILLS:   Skilled HH PT is needed to continue to follow up and re-assess, train and determine need for further HHPT.  PT is needed to provide interventions and training in distance ambulation and varied surfaces outdoors.  PT is needed to continue strength training and to monitor patient repsonse to activity, educate patient in energy conservation as needed in order to accomplish the tasks she attempting, and to assess readiness for d/c.  PLAN FOR NEXT VISIT:   Ambulation outside, strength training and re-assess FTSST.  THE FOLLOWING DISCHARGE PLANNING WAS DISCUSSED WITH THE PATIENT/CAREGIVER: Follow up next week tuesday or wed, determine readiness for d/c.

## 2024-04-15 ENCOUNTER — HOME CARE VISIT (OUTPATIENT)
Age: 34
End: 2024-04-15

## 2024-04-16 ENCOUNTER — HOME CARE VISIT (OUTPATIENT)
Age: 34
End: 2024-04-16

## 2024-04-17 ENCOUNTER — HOME CARE VISIT (OUTPATIENT)
Age: 34
End: 2024-04-17

## 2024-04-19 ENCOUNTER — HOME CARE VISIT (OUTPATIENT)
Age: 34
End: 2024-04-19

## 2024-04-22 ENCOUNTER — OFFICE VISIT (OUTPATIENT)
Facility: CLINIC | Age: 34
End: 2024-04-22
Payer: MEDICAID

## 2024-04-22 ENCOUNTER — HOME CARE VISIT (OUTPATIENT)
Age: 34
End: 2024-04-22

## 2024-04-22 VITALS
DIASTOLIC BLOOD PRESSURE: 80 MMHG | RESPIRATION RATE: 17 BRPM | SYSTOLIC BLOOD PRESSURE: 121 MMHG | WEIGHT: 293 LBS | HEIGHT: 61 IN | BODY MASS INDEX: 55.32 KG/M2 | TEMPERATURE: 97.1 F | OXYGEN SATURATION: 98 % | HEART RATE: 83 BPM

## 2024-04-22 DIAGNOSIS — E03.9 ACQUIRED HYPOTHYROIDISM: ICD-10-CM

## 2024-04-22 DIAGNOSIS — E03.5 MYXEDEMA COMA (HCC): Primary | ICD-10-CM

## 2024-04-22 DIAGNOSIS — F33.2 MDD (MAJOR DEPRESSIVE DISORDER), RECURRENT SEVERE, WITHOUT PSYCHOSIS (HCC): ICD-10-CM

## 2024-04-22 DIAGNOSIS — G47.33 OBSTRUCTIVE SLEEP APNEA: ICD-10-CM

## 2024-04-22 PROCEDURE — 99204 OFFICE O/P NEW MOD 45 MIN: CPT | Performed by: FAMILY MEDICINE

## 2024-04-22 RX ORDER — ESCITALOPRAM OXALATE 10 MG/1
10 TABLET ORAL DAILY
Qty: 30 TABLET | Refills: 3 | Status: SHIPPED | OUTPATIENT
Start: 2024-04-22

## 2024-04-22 SDOH — ECONOMIC STABILITY: FOOD INSECURITY: WITHIN THE PAST 12 MONTHS, YOU WORRIED THAT YOUR FOOD WOULD RUN OUT BEFORE YOU GOT MONEY TO BUY MORE.: NEVER TRUE

## 2024-04-22 SDOH — ECONOMIC STABILITY: INCOME INSECURITY: HOW HARD IS IT FOR YOU TO PAY FOR THE VERY BASICS LIKE FOOD, HOUSING, MEDICAL CARE, AND HEATING?: NOT HARD AT ALL

## 2024-04-22 SDOH — ECONOMIC STABILITY: FOOD INSECURITY: WITHIN THE PAST 12 MONTHS, THE FOOD YOU BOUGHT JUST DIDN'T LAST AND YOU DIDN'T HAVE MONEY TO GET MORE.: NEVER TRUE

## 2024-04-22 SDOH — ECONOMIC STABILITY: HOUSING INSECURITY
IN THE LAST 12 MONTHS, WAS THERE A TIME WHEN YOU DID NOT HAVE A STEADY PLACE TO SLEEP OR SLEPT IN A SHELTER (INCLUDING NOW)?: NO

## 2024-04-22 ASSESSMENT — PATIENT HEALTH QUESTIONNAIRE - PHQ9
5. POOR APPETITE OR OVEREATING: MORE THAN HALF THE DAYS
1. LITTLE INTEREST OR PLEASURE IN DOING THINGS: SEVERAL DAYS
SUM OF ALL RESPONSES TO PHQ QUESTIONS 1-9: 14
SUM OF ALL RESPONSES TO PHQ QUESTIONS 1-9: 14
6. FEELING BAD ABOUT YOURSELF - OR THAT YOU ARE A FAILURE OR HAVE LET YOURSELF OR YOUR FAMILY DOWN: NEARLY EVERY DAY
2. FEELING DOWN, DEPRESSED OR HOPELESS: MORE THAN HALF THE DAYS
10. IF YOU CHECKED OFF ANY PROBLEMS, HOW DIFFICULT HAVE THESE PROBLEMS MADE IT FOR YOU TO DO YOUR WORK, TAKE CARE OF THINGS AT HOME, OR GET ALONG WITH OTHER PEOPLE: SOMEWHAT DIFFICULT
SUM OF ALL RESPONSES TO PHQ9 QUESTIONS 1 & 2: 3
8. MOVING OR SPEAKING SO SLOWLY THAT OTHER PEOPLE COULD HAVE NOTICED. OR THE OPPOSITE, BEING SO FIGETY OR RESTLESS THAT YOU HAVE BEEN MOVING AROUND A LOT MORE THAN USUAL: NOT AT ALL
4. FEELING TIRED OR HAVING LITTLE ENERGY: MORE THAN HALF THE DAYS
7. TROUBLE CONCENTRATING ON THINGS, SUCH AS READING THE NEWSPAPER OR WATCHING TELEVISION: NOT AT ALL
3. TROUBLE FALLING OR STAYING ASLEEP: MORE THAN HALF THE DAYS
9. THOUGHTS THAT YOU WOULD BE BETTER OFF DEAD, OR OF HURTING YOURSELF: MORE THAN HALF THE DAYS
SUM OF ALL RESPONSES TO PHQ QUESTIONS 1-9: 12
SUM OF ALL RESPONSES TO PHQ QUESTIONS 1-9: 14

## 2024-04-22 ASSESSMENT — ANXIETY QUESTIONNAIRES
IF YOU CHECKED OFF ANY PROBLEMS ON THIS QUESTIONNAIRE, HOW DIFFICULT HAVE THESE PROBLEMS MADE IT FOR YOU TO DO YOUR WORK, TAKE CARE OF THINGS AT HOME, OR GET ALONG WITH OTHER PEOPLE: SOMEWHAT DIFFICULT
5. BEING SO RESTLESS THAT IT IS HARD TO SIT STILL: SEVERAL DAYS
2. NOT BEING ABLE TO STOP OR CONTROL WORRYING: SEVERAL DAYS
1. FEELING NERVOUS, ANXIOUS, OR ON EDGE: SEVERAL DAYS
GAD7 TOTAL SCORE: 7
3. WORRYING TOO MUCH ABOUT DIFFERENT THINGS: SEVERAL DAYS
6. BECOMING EASILY ANNOYED OR IRRITABLE: SEVERAL DAYS
7. FEELING AFRAID AS IF SOMETHING AWFUL MIGHT HAPPEN: SEVERAL DAYS
4. TROUBLE RELAXING: SEVERAL DAYS

## 2024-04-22 ASSESSMENT — COLUMBIA-SUICIDE SEVERITY RATING SCALE - C-SSRS
2. HAVE YOU ACTUALLY HAD ANY THOUGHTS OF KILLING YOURSELF?: NO
6. HAVE YOU EVER DONE ANYTHING, STARTED TO DO ANYTHING, OR PREPARED TO DO ANYTHING TO END YOUR LIFE?: YES
1. WITHIN THE PAST MONTH, HAVE YOU WISHED YOU WERE DEAD OR WISHED YOU COULD GO TO SLEEP AND NOT WAKE UP?: YES
7. DID THIS OCCUR IN THE LAST THREE MONTHS: NO

## 2024-04-22 NOTE — PROGRESS NOTES
Aditya Gusman is a 33 y.o.  female and presents with    Chief Complaint   Patient presents with    New Patient    Follow-Up from Hospital    Medication Refill       Subjective:  Ms. Gusman is following up after thyroid emergency with intubation; she is taking medication as prescribed at discharge; she reports that her mood is terrible.  She has mood swings.  She gets aggravated and angry; she is intermittently tearful.  She has intermittent thoughts of SI but denies plan.  She is currently living with her mother.    She has been on lexapro in the past    Prior to her hospitalization she had stopped levothyroxine.      She has h/o pneumonia  ROS   General ROS: positive for  - chills, fatigue, and weight gain  Psychological ROS: positive for - depression and suicidal ideation  Ophthalmic ROS: negative for - blurry vision  ENT ROS: positive for - headaches  Endocrine ROS: positive for - temperature intolerance  Respiratory ROS: no cough, shortness of breath, or wheezing  Cardiovascular ROS: no chest pain or dyspnea on exertion  Gastrointestinal ROS: no abdominal pain, change in bowel habits, or black or bloody stools  Genito-Urinary ROS: no dysuria, trouble voiding, or hematuria  Musculoskeletal ROS: positive for - pain in lower back  Neurological ROS: positive for - numbness/tingling  Dermatological ROS: negative for - rash or skin lesion changes    All other systems reviewed and are negative.      Objective:  Vitals:    04/22/24 1225   BP: 121/80   Pulse:    Resp:    Temp:    SpO2:          alert, well appearing, and in no distress and oriented to person, place, and time  Mental status - depressed mood  Nose - normal and patent, no erythema, discharge or polyps  Chest - clear to auscultation, no wheezes, rales or rhonchi, symmetric air entry  Heart - normal rate, regular rhythm, normal S1, S2, no murmurs, rubs, clicks or gallops  Extremities - pedal edema 1 +    LABS   TSH

## 2024-04-22 NOTE — PROGRESS NOTES
Aditya Gusman is a 33 y.o. presents today for   Chief Complaint   Patient presents with    New Patient    Follow-Up from Hospital    Medication Refill     Is someone accompanying this pt? no    Is the patient using any DME equipment during OV? no  There were no vitals filed for this visit.    Depression Screenin/22/2024    12:20 PM   PHQ-9 Questionaire   Little interest or pleasure in doing things 1   Feeling down, depressed, or hopeless 2   Trouble falling or staying asleep, or sleeping too much 2   Feeling tired or having little energy 2   Poor appetite or overeating 2   Feeling bad about yourself - or that you are a failure or have let yourself or your family down 3   Trouble concentrating on things, such as reading the newspaper or watching television 0   Moving or speaking so slowly that other people could have noticed. Or the opposite - being so fidgety or restless that you have been moving around a lot more than usual 0   Thoughts that you would be better off dead, or of hurting yourself in some way 2   PHQ-9 Total Score 14   If you checked off any problems, how difficult have these problems made it for you to do your work, take care of things at home, or get along with other people? 1        Abuse Screenin/22/2024    12:00 PM   AMB Abuse Screening   Do you ever feel afraid of your partner? N   Are you in a relationship with someone who physically or mentally threatens you? N   Is it safe for you to go home? Y        Learning Assessment Screening:   No question data found.     Fall Risk Screening:        No data to display                    Health Maintenance: reviewed and discussed and ordered per Provider.    Health Maintenance Due   Topic Date Due    Hepatitis B vaccine (1 of 3 - 3-dose series) Never done    Varicella vaccine (1 of 2 - 2-dose childhood series) Never done    Depression Monitoring  Never done    Hepatitis C screen  Never done    Cervical cancer screen  Never done

## 2024-04-23 ENCOUNTER — HOME CARE VISIT (OUTPATIENT)
Age: 34
End: 2024-04-23

## 2024-04-23 PROCEDURE — G0151 HHCP-SERV OF PT,EA 15 MIN: HCPCS

## 2024-04-24 VITALS
HEART RATE: 78 BPM | TEMPERATURE: 97.3 F | RESPIRATION RATE: 14 BRPM | SYSTOLIC BLOOD PRESSURE: 140 MMHG | DIASTOLIC BLOOD PRESSURE: 70 MMHG

## 2024-04-26 NOTE — HOME HEALTH
Pt. clinically discharged and documentation finalized for completion of agency discharge.      Patient Status:  Patient has met HHPT goals.  She is safe and independent in the home.  She was difficult to schedule due to poor compliance with communication (unable to reach or return calls), and missed 4 of 10 anticipated HH PT visits, was seen for 4 HHPT visits and d/c early due to goals met.  She received 1 HH SN visit and did not receive OT eval for the same reason:  Unavailable at time of appt or unable to contact and schedule.  Wounds: n/a  Caregiver involvement: Her mother and father assist as needed, she lives with her 2 sons and parents in a 2 story home, with extended family close as well.  Medications reconciled and all medications are not available in the home this visit.   Medications she has are effective at this time.    Home health supplies by type and quantity ordered/delivered this visit include: n/a  Patient education provided: Ed re: change pharmacies to get meds needed, continue HEP, walking program, consider aquatic exercise to reduce strain on joints.  Patient/caregiver response to education:  Patient and her mother were receptive to the information and verbally acknowledged it.  Progress toward goals/ Patient response to treatment:   Gait:  Walks without an AD in and outside the home, no significant deviation.  Is able to navigate steps to 2nd floor and in/out of home without shortness of breath.    Transfers:  Safe and independent in the home.  Balance:  Low fall risk per Tinetti:  28/28 on 4/12/24, improved from initial high fall risk 18/28 on initial eval.  Strength:  Good functional strength per FTSST 9.5 seconds, WNL.  Able to ascend 12+ stairs in reciprocal pattern without fatigue.  Initial FTSST was 14.6 seconds and indicated a fall risk (12 seconds or greater).  ROM:    BLE WFL.  Bed mobility:  Independent  Home exercise program: Walk hourly 1-2 min, and take a walk daily.  Complete five

## 2024-05-20 PROBLEM — E03.8 OTHER SPECIFIED HYPOTHYROIDISM: Status: ACTIVE | Noted: 2024-05-20

## 2024-10-04 ENCOUNTER — OFFICE VISIT (OUTPATIENT)
Dept: PRIMARY CARE | Facility: EXTERNAL LOCATION | Age: 34
End: 2024-10-04

## 2024-10-04 VITALS
HEIGHT: 62 IN | BODY MASS INDEX: 32.57 KG/M2 | HEART RATE: 88 BPM | WEIGHT: 177 LBS | SYSTOLIC BLOOD PRESSURE: 140 MMHG | DIASTOLIC BLOOD PRESSURE: 108 MMHG

## 2024-10-04 DIAGNOSIS — Z02.0 SCHOOL PHYSICAL EXAM: Primary | ICD-10-CM

## 2024-10-04 DIAGNOSIS — I15.9 SECONDARY HYPERTENSION: ICD-10-CM

## 2024-10-04 PROBLEM — L73.2 HIDRADENITIS SUPPURATIVA: Status: ACTIVE | Noted: 2024-10-04

## 2024-10-04 PROBLEM — K21.9 GERD (GASTROESOPHAGEAL REFLUX DISEASE): Status: ACTIVE | Noted: 2024-10-04

## 2024-10-04 PROBLEM — R87.613 HGSIL (HIGH GRADE SQUAMOUS INTRAEPITHELIAL LESION) ON PAP SMEAR OF CERVIX: Status: ACTIVE | Noted: 2024-10-04

## 2024-10-04 RX ORDER — AMITRIPTYLINE HYDROCHLORIDE 10 MG/1
TABLET, FILM COATED ORAL
COMMUNITY
Start: 2024-01-19

## 2024-10-04 RX ORDER — SPIRONOLACTONE 100 MG/1
100 TABLET, FILM COATED ORAL 2 TIMES DAILY
COMMUNITY
Start: 2024-04-04

## 2024-10-04 RX ORDER — SUMATRIPTAN SUCCINATE 100 MG/1
TABLET ORAL
COMMUNITY
Start: 2024-01-19

## 2024-10-04 RX ORDER — LOSARTAN POTASSIUM 50 MG/1
50 TABLET ORAL
COMMUNITY
Start: 2023-12-11

## 2024-10-04 ASSESSMENT — ENCOUNTER SYMPTOMS
FLANK PAIN: 0
DIFFICULTY URINATING: 0
MYALGIAS: 0
WEAKNESS: 0
POLYPHAGIA: 0
ABDOMINAL DISTENTION: 0
LIGHT-HEADEDNESS: 0
EYE REDNESS: 0
FREQUENCY: 0
FATIGUE: 0
FEVER: 0
ACTIVITY CHANGE: 0
TREMORS: 0
APPETITE CHANGE: 0
COUGH: 0
EYE PAIN: 0
DECREASED CONCENTRATION: 0
EYE ITCHING: 0
NUMBNESS: 0
DIARRHEA: 0
TROUBLE SWALLOWING: 0
ARTHRALGIAS: 0
NECK STIFFNESS: 0
HEADACHES: 0
BACK PAIN: 0
HEMATURIA: 0
CHEST TIGHTNESS: 0
COLOR CHANGE: 0
SHORTNESS OF BREATH: 0
PHOTOPHOBIA: 0
DYSURIA: 0
CHILLS: 0
JOINT SWELLING: 0
DIZZINESS: 0
PALPITATIONS: 0
VOICE CHANGE: 0
ABDOMINAL PAIN: 0
WHEEZING: 0
RHINORRHEA: 0
SEIZURES: 0
STRIDOR: 0
SPEECH DIFFICULTY: 0
HALLUCINATIONS: 0
UNEXPECTED WEIGHT CHANGE: 0
NECK PAIN: 0
CONSTIPATION: 0
NAUSEA: 0
DIAPHORESIS: 0
CONFUSION: 0
POLYDIPSIA: 0
EYE DISCHARGE: 0
VOMITING: 0

## 2024-10-04 ASSESSMENT — PATIENT HEALTH QUESTIONNAIRE - PHQ9
1. LITTLE INTEREST OR PLEASURE IN DOING THINGS: NOT AT ALL
2. FEELING DOWN, DEPRESSED OR HOPELESS: NOT AT ALL
SUM OF ALL RESPONSES TO PHQ9 QUESTIONS 1 AND 2: 0

## 2024-10-04 ASSESSMENT — COLUMBIA-SUICIDE SEVERITY RATING SCALE - C-SSRS
2. HAVE YOU ACTUALLY HAD ANY THOUGHTS OF KILLING YOURSELF?: NO
6. HAVE YOU EVER DONE ANYTHING, STARTED TO DO ANYTHING, OR PREPARED TO DO ANYTHING TO END YOUR LIFE?: NO
1. IN THE PAST MONTH, HAVE YOU WISHED YOU WERE DEAD OR WISHED YOU COULD GO TO SLEEP AND NOT WAKE UP?: NO

## 2024-10-04 NOTE — PROGRESS NOTES
Subjective   Patient ID: Abdulaziz Kennedy is a 34 y.o. 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    HPI:  Here for nursing school physical.    BP elevated. Did not take BP meds today.     Denies chest pain, shortness of breath, musculoskeletal complaints, neuro complaints, vision changes or any symptoms that would interfere with ability to participate in nursing school, nursing clinicals or provide patient care. Has no symptoms/complaints at this time.      Patient  reports has never been restricted/declined participation in activities from a medical standpoint.      Denies family history of sudden cardiac death at an early age.   Denies family history of congenital heart defects.        No Known Allergies      Current Outpatient Medications   Medication Sig Dispense Refill    amitriptyline (Elavil) 10 mg tablet Take 2 tablets by mouth at night      etonogestrel-eluting contraceptive 68 mg implant implant Inject 1 each under the skin.      losartan (Cozaar) 50 mg tablet Take 1 tablet (50 mg) by mouth once daily.      spironolactone (Aldactone) 100 mg tablet Take 1 tablet (100 mg) by mouth twice a day.      SUMAtriptan (Imitrex) 100 mg tablet Take 1 tablet by mouth at onset of a moderate to severe migraine, if not completely gone in 2 hours you may repeat the dose x 1. Maximum of 2 doses in 24 hours, Maximum use of 2 days each week.       No current facility-administered medications for this visit.          Past Medical History:   Diagnosis Date    Chlamydial infection, unspecified 06/11/2014    Chlamydia    GERD (gastroesophageal reflux disease) 10/04/2024    HGSIL (high grade squamous intraepithelial lesion) on Pap smear of cervix 10/04/2024    Hidradenitis suppurativa 10/04/2024    Personal history of diseases of the skin and subcutaneous tissue 12/14/2015    History of keloid of skin    Twin pregnancy, dichorionic/diamniotic, unspecified trimester ([facility]) 08/30/2016    Dichorionic  "diamniotic twin pregnancy       Past Surgical History:   Procedure Laterality Date     SECTION, LOW TRANSVERSE      OTHER SURGICAL HISTORY  2014    Append Laparoscopy Appendix Removed Directly Through A Port    OTHER SURGICAL HISTORY  2015    Excision For Hidradenitis Axillary       No family history on file.     Review of Systems   Constitutional:  Negative for activity change, appetite change, chills, diaphoresis, fatigue, fever and unexpected weight change.   HENT:  Negative for congestion, dental problem, hearing loss, rhinorrhea, tinnitus, trouble swallowing and voice change.    Eyes:  Negative for photophobia, pain, discharge, redness, itching and visual disturbance.   Respiratory:  Negative for cough, chest tightness, shortness of breath, wheezing and stridor.    Cardiovascular:  Negative for chest pain, palpitations and leg swelling.   Gastrointestinal:  Negative for abdominal distention, abdominal pain, constipation, diarrhea, nausea and vomiting.   Endocrine: Negative for cold intolerance, heat intolerance, polydipsia, polyphagia and polyuria.   Genitourinary:  Negative for difficulty urinating, dysuria, flank pain, frequency, hematuria and urgency.   Musculoskeletal:  Negative for arthralgias, back pain, gait problem, joint swelling, myalgias, neck pain and neck stiffness.   Skin:  Negative for color change and rash.   Neurological:  Negative for dizziness, tremors, seizures, syncope, speech difficulty, weakness, light-headedness, numbness and headaches.   Psychiatric/Behavioral:  Negative for behavioral problems, confusion, decreased concentration, hallucinations, self-injury and suicidal ideas.        Objective     Visit Vitals  BP (!) 140/108   Pulse 88   Ht 1.575 m (5' 2\")   Wt 80.3 kg (177 lb)   BMI 32.37 kg/m²   OB Status Implant   Smoking Status Never   BSA 1.87 m²       Vision Screening    Right eye Left eye Both eyes   Without correction 13 13 20/15   With correction       "     Physical Exam  Constitutional:       General: She is not in acute distress.     Appearance: Normal appearance. She is not ill-appearing.   HENT:      Head: Normocephalic and atraumatic.      Right Ear: Tympanic membrane, ear canal and external ear normal.      Left Ear: Tympanic membrane, ear canal and external ear normal.      Nose: Nose normal.      Mouth/Throat:      Mouth: Mucous membranes are moist.      Pharynx: Oropharynx is clear. No oropharyngeal exudate or posterior oropharyngeal erythema.   Eyes:      Extraocular Movements: Extraocular movements intact.      Conjunctiva/sclera: Conjunctivae normal.      Pupils: Pupils are equal, round, and reactive to light.   Cardiovascular:      Rate and Rhythm: Normal rate and regular rhythm.      Pulses: Normal pulses.      Heart sounds: Normal heart sounds. No murmur heard.  Pulmonary:      Effort: Pulmonary effort is normal. No respiratory distress.      Breath sounds: Normal breath sounds. No wheezing, rhonchi or rales.   Abdominal:      General: Abdomen is flat. Bowel sounds are normal. There is no distension.      Palpations: Abdomen is soft.      Tenderness: There is no abdominal tenderness. There is no guarding.   Genitourinary:     Comments: deferred  Musculoskeletal:         General: No swelling, tenderness, deformity or signs of injury. Normal range of motion.      Right upper arm: Normal.      Left upper arm: Normal.      Right wrist: Normal.      Left wrist: Normal.      Right hand: Normal.      Left hand: Normal.      Cervical back: Normal, normal range of motion and neck supple. No deformity, rigidity or bony tenderness. Normal range of motion.      Thoracic back: Normal. No deformity or bony tenderness. Normal range of motion.      Lumbar back: Normal. No deformity or bony tenderness. Normal range of motion.      Right hip: Normal.      Left hip: Normal.      Right upper leg: Normal.      Left upper leg: Normal.      Right knee: Normal.      Left  knee: Normal.      Right lower leg: Normal. No deformity. No edema.      Left lower leg: Normal. No deformity. No edema.      Comments: Strength normal and equal in upper and lower extremities KELLY   Skin:     General: Skin is warm.      Capillary Refill: Capillary refill takes less than 2 seconds.   Neurological:      General: No focal deficit present.      Mental Status: She is alert. Mental status is at baseline.      Cranial Nerves: No cranial nerve deficit.      Sensory: No sensory deficit.      Motor: No weakness.      Coordination: Coordination normal.      Gait: Gait normal.      Deep Tendon Reflexes: Reflexes normal.   Psychiatric:         Mood and Affect: Mood normal.         Behavior: Behavior normal.         Thought Content: Thought content normal.           Assessment/Plan   Diagnoses and all orders for this visit:  School physical exam  Secondary hypertension    - BP elevated. Patient did not take BP meds today.   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 Instructed to patient to take medication as prescribed and return to clinic for BP check.   - Monitor BP. ED if develops chest pain, SOB, etc.   - Follow up with PCP for routine medical exams/blood work/ preventative screenings.   - Follow up with PCP with development of any symptoms.

## 2024-10-27 ENCOUNTER — HOSPITAL ENCOUNTER (INPATIENT)
Facility: HOSPITAL | Age: 34
LOS: 3 days | Discharge: LEFT BEFORE DISCHARGE | DRG: 059 | End: 2024-10-30
Attending: INTERNAL MEDICINE | Admitting: INTERNAL MEDICINE
Payer: MEDICAID

## 2024-10-27 ENCOUNTER — APPOINTMENT (OUTPATIENT)
Facility: HOSPITAL | Age: 34
DRG: 059 | End: 2024-10-27
Payer: MEDICAID

## 2024-10-27 DIAGNOSIS — E03.9 SEVERE HYPOTHYROIDISM: Primary | ICD-10-CM

## 2024-10-27 DIAGNOSIS — I50.32 CHRONIC DIASTOLIC CONGESTIVE HEART FAILURE (HCC): ICD-10-CM

## 2024-10-27 PROBLEM — I10 PRIMARY HYPERTENSION: Status: ACTIVE | Noted: 2024-10-27

## 2024-10-27 PROBLEM — E06.3 HASHIMOTO THYROIDITIS: Status: ACTIVE | Noted: 2024-10-27

## 2024-10-27 PROBLEM — K76.0 HEPATIC STEATOSIS: Status: ACTIVE | Noted: 2024-10-27

## 2024-10-27 LAB
ALBUMIN SERPL-MCNC: 4 G/DL (ref 3.4–5)
ALBUMIN/GLOB SERPL: 1.1 (ref 0.8–1.7)
ALP SERPL-CCNC: 80 U/L (ref 45–117)
ALT SERPL-CCNC: 22 U/L (ref 13–56)
AMPHET UR QL SCN: POSITIVE
ANION GAP SERPL CALC-SCNC: 4 MMOL/L (ref 3–18)
APPEARANCE UR: CLEAR
AST SERPL-CCNC: 29 U/L (ref 10–38)
BACTERIA URNS QL MICRO: ABNORMAL /HPF
BARBITURATES UR QL SCN: NEGATIVE
BASE EXCESS BLDV CALC-SCNC: 7.4 MMOL/L
BASOPHILS # BLD: 0.1 K/UL (ref 0–0.1)
BASOPHILS NFR BLD: 1 % (ref 0–2)
BENZODIAZ UR QL: NEGATIVE
BILIRUB SERPL-MCNC: 0.4 MG/DL (ref 0.2–1)
BILIRUB UR QL: NEGATIVE
BUN SERPL-MCNC: 20 MG/DL (ref 7–18)
BUN/CREAT SERPL: 19 (ref 12–20)
CALCIUM SERPL-MCNC: 9.3 MG/DL (ref 8.5–10.1)
CANNABINOIDS UR QL SCN: NEGATIVE
CHLORIDE SERPL-SCNC: 101 MMOL/L (ref 100–111)
CO2 SERPL-SCNC: 30 MMOL/L (ref 21–32)
COCAINE UR QL SCN: NEGATIVE
COLOR UR: YELLOW
CREAT SERPL-MCNC: 1.04 MG/DL (ref 0.6–1.3)
DIFFERENTIAL METHOD BLD: ABNORMAL
EOSINOPHIL # BLD: 0.2 K/UL (ref 0–0.4)
EOSINOPHIL NFR BLD: 2 % (ref 0–5)
EPITH CASTS URNS QL MICRO: ABNORMAL /LPF (ref 0–5)
ERYTHROCYTE [DISTWIDTH] IN BLOOD BY AUTOMATED COUNT: 15.8 % (ref 11.6–14.5)
FENTANYL: NEGATIVE
GAS FLOW.O2 O2 DELIVERY SYS: ABNORMAL
GLOBULIN SER CALC-MCNC: 3.8 G/DL (ref 2–4)
GLUCOSE SERPL-MCNC: 102 MG/DL (ref 74–99)
GLUCOSE UR STRIP.AUTO-MCNC: NEGATIVE MG/DL
HCG SERPL QL: NEGATIVE
HCO3 BLDV-SCNC: 33.6 MMOL/L (ref 23–28)
HCT VFR BLD AUTO: 37.1 % (ref 35–45)
HGB BLD-MCNC: 11.4 G/DL (ref 12–16)
HGB UR QL STRIP: ABNORMAL
IMM GRANULOCYTES # BLD AUTO: 0.1 K/UL (ref 0–0.04)
IMM GRANULOCYTES NFR BLD AUTO: 1 % (ref 0–0.5)
KETONES UR QL STRIP.AUTO: NEGATIVE MG/DL
LEUKOCYTE ESTERASE UR QL STRIP.AUTO: NEGATIVE
LIPASE SERPL-CCNC: 17 U/L (ref 13–75)
LYMPHOCYTES # BLD: 2.1 K/UL (ref 0.9–3.6)
LYMPHOCYTES NFR BLD: 26 % (ref 21–52)
Lab: ABNORMAL
Lab: NORMAL
MCH RBC QN AUTO: 26.8 PG (ref 24–34)
MCHC RBC AUTO-ENTMCNC: 30.7 G/DL (ref 31–37)
MCV RBC AUTO: 87.1 FL (ref 78–100)
METHADONE UR QL: NEGATIVE
MONOCYTES # BLD: 0.5 K/UL (ref 0.05–1.2)
MONOCYTES NFR BLD: 6 % (ref 3–10)
NEUTS SEG # BLD: 5.4 K/UL (ref 1.8–8)
NEUTS SEG NFR BLD: 65 % (ref 40–73)
NITRITE UR QL STRIP.AUTO: NEGATIVE
NRBC # BLD: 0 K/UL (ref 0–0.01)
NRBC BLD-RTO: 0 PER 100 WBC
NT PRO BNP: 81 PG/ML (ref 0–450)
OPIATES UR QL: NEGATIVE
OXYCODONE UR QL SCN: NEGATIVE
PCO2 BLDV: 53.9 MMHG (ref 41–51)
PCP UR QL: NEGATIVE
PH BLDV: 7.4 (ref 7.32–7.42)
PH UR STRIP: 7 (ref 5–8)
PLATELET # BLD AUTO: 249 K/UL (ref 135–420)
PMV BLD AUTO: 12.2 FL (ref 9.2–11.8)
PO2 BLDV: 23 MMHG (ref 25–40)
POTASSIUM SERPL-SCNC: 4 MMOL/L (ref 3.5–5.5)
PROT SERPL-MCNC: 7.8 G/DL (ref 6.4–8.2)
PROT UR STRIP-MCNC: 30 MG/DL
RBC # BLD AUTO: 4.26 M/UL (ref 4.2–5.3)
RBC #/AREA URNS HPF: ABNORMAL /HPF (ref 0–5)
SAO2 % BLDV: 38.9 % (ref 65–88)
SERVICE CMNT-IMP: ABNORMAL
SODIUM SERPL-SCNC: 135 MMOL/L (ref 136–145)
SP GR UR REFRACTOMETRY: 1.02 (ref 1–1.03)
SPECIMEN TYPE: ABNORMAL
T4 FREE SERPL-MCNC: 0.1 NG/DL (ref 0.7–1.5)
TSH SERPL DL<=0.05 MIU/L-ACNC: 83.9 UIU/ML (ref 0.36–3.74)
UROBILINOGEN UR QL STRIP.AUTO: 0.2 EU/DL (ref 0.2–1)
WBC # BLD AUTO: 8.2 K/UL (ref 4.6–13.2)
WBC URNS QL MICRO: ABNORMAL /HPF (ref 0–5)

## 2024-10-27 PROCEDURE — 83690 ASSAY OF LIPASE: CPT

## 2024-10-27 PROCEDURE — 2580000003 HC RX 258: Performed by: PHYSICIAN ASSISTANT

## 2024-10-27 PROCEDURE — 5A09357 ASSISTANCE WITH RESPIRATORY VENTILATION, LESS THAN 24 CONSECUTIVE HOURS, CONTINUOUS POSITIVE AIRWAY PRESSURE: ICD-10-PCS | Performed by: STUDENT IN AN ORGANIZED HEALTH CARE EDUCATION/TRAINING PROGRAM

## 2024-10-27 PROCEDURE — 83880 ASSAY OF NATRIURETIC PEPTIDE: CPT

## 2024-10-27 PROCEDURE — 80053 COMPREHEN METABOLIC PANEL: CPT

## 2024-10-27 PROCEDURE — 99223 1ST HOSP IP/OBS HIGH 75: CPT | Performed by: PHYSICIAN ASSISTANT

## 2024-10-27 PROCEDURE — 74177 CT ABD & PELVIS W/CONTRAST: CPT

## 2024-10-27 PROCEDURE — 85025 COMPLETE CBC W/AUTO DIFF WBC: CPT

## 2024-10-27 PROCEDURE — 6360000002 HC RX W HCPCS: Performed by: PHYSICIAN ASSISTANT

## 2024-10-27 PROCEDURE — 1100000003 HC PRIVATE W/ TELEMETRY

## 2024-10-27 PROCEDURE — 83605 ASSAY OF LACTIC ACID: CPT

## 2024-10-27 PROCEDURE — 84703 CHORIONIC GONADOTROPIN ASSAY: CPT

## 2024-10-27 PROCEDURE — 81001 URINALYSIS AUTO W/SCOPE: CPT

## 2024-10-27 PROCEDURE — 82803 BLOOD GASES ANY COMBINATION: CPT

## 2024-10-27 PROCEDURE — 99285 EMERGENCY DEPT VISIT HI MDM: CPT

## 2024-10-27 PROCEDURE — 84480 ASSAY TRIIODOTHYRONINE (T3): CPT

## 2024-10-27 PROCEDURE — 6360000004 HC RX CONTRAST MEDICATION: Performed by: PHYSICIAN ASSISTANT

## 2024-10-27 PROCEDURE — 80307 DRUG TEST PRSMV CHEM ANLYZR: CPT

## 2024-10-27 PROCEDURE — 94660 CPAP INITIATION&MGMT: CPT

## 2024-10-27 PROCEDURE — 84443 ASSAY THYROID STIM HORMONE: CPT

## 2024-10-27 PROCEDURE — 84439 ASSAY OF FREE THYROXINE: CPT

## 2024-10-27 PROCEDURE — 2500000003 HC RX 250 WO HCPCS: Performed by: PHYSICIAN ASSISTANT

## 2024-10-27 PROCEDURE — 6370000000 HC RX 637 (ALT 250 FOR IP): Performed by: PHYSICIAN ASSISTANT

## 2024-10-27 RX ORDER — SODIUM CHLORIDE 9 MG/ML
INJECTION, SOLUTION INTRAVENOUS PRN
Status: DISCONTINUED | OUTPATIENT
Start: 2024-10-27 | End: 2024-10-30 | Stop reason: HOSPADM

## 2024-10-27 RX ORDER — SODIUM CHLORIDE 0.9 % (FLUSH) 0.9 %
5-40 SYRINGE (ML) INJECTION PRN
Status: DISCONTINUED | OUTPATIENT
Start: 2024-10-27 | End: 2024-10-30 | Stop reason: HOSPADM

## 2024-10-27 RX ORDER — IOPAMIDOL 612 MG/ML
100 INJECTION, SOLUTION INTRAVASCULAR
Status: COMPLETED | OUTPATIENT
Start: 2024-10-27 | End: 2024-10-27

## 2024-10-27 RX ORDER — ENOXAPARIN SODIUM 100 MG/ML
30 INJECTION SUBCUTANEOUS 2 TIMES DAILY
Status: DISCONTINUED | OUTPATIENT
Start: 2024-10-28 | End: 2024-10-30 | Stop reason: HOSPADM

## 2024-10-27 RX ORDER — ONDANSETRON 4 MG/1
4 TABLET, ORALLY DISINTEGRATING ORAL EVERY 8 HOURS PRN
Status: DISCONTINUED | OUTPATIENT
Start: 2024-10-27 | End: 2024-10-30 | Stop reason: HOSPADM

## 2024-10-27 RX ORDER — ACETAMINOPHEN 650 MG/1
650 SUPPOSITORY RECTAL EVERY 6 HOURS PRN
Status: DISCONTINUED | OUTPATIENT
Start: 2024-10-27 | End: 2024-10-30 | Stop reason: HOSPADM

## 2024-10-27 RX ORDER — PANTOPRAZOLE SODIUM 40 MG/1
40 TABLET, DELAYED RELEASE ORAL
Status: DISCONTINUED | OUTPATIENT
Start: 2024-10-28 | End: 2024-10-30 | Stop reason: HOSPADM

## 2024-10-27 RX ORDER — FERROUS SULFATE 325(65) MG
325 TABLET ORAL
Status: DISCONTINUED | OUTPATIENT
Start: 2024-10-28 | End: 2024-10-30 | Stop reason: HOSPADM

## 2024-10-27 RX ORDER — POLYETHYLENE GLYCOL 3350 17 G/17G
17 POWDER, FOR SOLUTION ORAL DAILY PRN
Status: DISCONTINUED | OUTPATIENT
Start: 2024-10-27 | End: 2024-10-30 | Stop reason: HOSPADM

## 2024-10-27 RX ORDER — LEVOTHYROXINE SODIUM ANHYDROUS 100 UG/5ML
300 INJECTION, POWDER, LYOPHILIZED, FOR SOLUTION INTRAVENOUS DAILY
Status: DISCONTINUED | OUTPATIENT
Start: 2024-10-28 | End: 2024-10-27 | Stop reason: SDUPTHER

## 2024-10-27 RX ORDER — ONDANSETRON 2 MG/ML
4 INJECTION INTRAMUSCULAR; INTRAVENOUS EVERY 6 HOURS PRN
Status: DISCONTINUED | OUTPATIENT
Start: 2024-10-27 | End: 2024-10-30 | Stop reason: HOSPADM

## 2024-10-27 RX ORDER — MORPHINE SULFATE 2 MG/ML
2 INJECTION, SOLUTION INTRAMUSCULAR; INTRAVENOUS
Status: COMPLETED | OUTPATIENT
Start: 2024-10-27 | End: 2024-10-27

## 2024-10-27 RX ORDER — ACETAMINOPHEN 325 MG/1
650 TABLET ORAL EVERY 6 HOURS PRN
Status: DISCONTINUED | OUTPATIENT
Start: 2024-10-27 | End: 2024-10-30 | Stop reason: HOSPADM

## 2024-10-27 RX ORDER — LOSARTAN POTASSIUM 50 MG/1
50 TABLET ORAL DAILY
Status: DISCONTINUED | OUTPATIENT
Start: 2024-10-28 | End: 2024-10-30 | Stop reason: HOSPADM

## 2024-10-27 RX ORDER — POTASSIUM CHLORIDE 1500 MG/1
40 TABLET, EXTENDED RELEASE ORAL PRN
Status: DISCONTINUED | OUTPATIENT
Start: 2024-10-27 | End: 2024-10-30 | Stop reason: HOSPADM

## 2024-10-27 RX ORDER — ESCITALOPRAM OXALATE 10 MG/1
10 TABLET ORAL DAILY
Status: DISCONTINUED | OUTPATIENT
Start: 2024-10-28 | End: 2024-10-30 | Stop reason: HOSPADM

## 2024-10-27 RX ORDER — MORPHINE SULFATE 4 MG/ML
4 INJECTION, SOLUTION INTRAMUSCULAR; INTRAVENOUS
Status: DISCONTINUED | OUTPATIENT
Start: 2024-10-27 | End: 2024-10-27

## 2024-10-27 RX ORDER — MAGNESIUM SULFATE IN WATER 40 MG/ML
2000 INJECTION, SOLUTION INTRAVENOUS PRN
Status: DISCONTINUED | OUTPATIENT
Start: 2024-10-27 | End: 2024-10-30 | Stop reason: HOSPADM

## 2024-10-27 RX ORDER — LIOTHYRONINE SODIUM 10 UG/ML
5 INJECTION, SOLUTION INTRAVENOUS DAILY
Status: DISCONTINUED | OUTPATIENT
Start: 2024-10-27 | End: 2024-10-27

## 2024-10-27 RX ORDER — LIOTHYRONINE SODIUM 10 UG/ML
5 INJECTION, SOLUTION INTRAVENOUS EVERY 8 HOURS
Status: DISCONTINUED | OUTPATIENT
Start: 2024-10-28 | End: 2024-10-29

## 2024-10-27 RX ORDER — LEVOTHYROXINE SODIUM ANHYDROUS 100 UG/5ML
400 INJECTION, POWDER, LYOPHILIZED, FOR SOLUTION INTRAVENOUS ONCE
Status: DISCONTINUED | OUTPATIENT
Start: 2024-10-27 | End: 2024-10-27 | Stop reason: SDUPTHER

## 2024-10-27 RX ORDER — FUROSEMIDE 20 MG/1
20 TABLET ORAL 2 TIMES DAILY
Status: DISCONTINUED | OUTPATIENT
Start: 2024-10-27 | End: 2024-10-30 | Stop reason: HOSPADM

## 2024-10-27 RX ORDER — SODIUM CHLORIDE 0.9 % (FLUSH) 0.9 %
5-40 SYRINGE (ML) INJECTION EVERY 12 HOURS SCHEDULED
Status: DISCONTINUED | OUTPATIENT
Start: 2024-10-27 | End: 2024-10-30 | Stop reason: HOSPADM

## 2024-10-27 RX ORDER — POTASSIUM CHLORIDE 7.45 MG/ML
10 INJECTION INTRAVENOUS PRN
Status: DISCONTINUED | OUTPATIENT
Start: 2024-10-27 | End: 2024-10-30 | Stop reason: HOSPADM

## 2024-10-27 RX ORDER — BISACODYL 10 MG
10 SUPPOSITORY, RECTAL RECTAL DAILY PRN
Status: DISCONTINUED | OUTPATIENT
Start: 2024-10-27 | End: 2024-10-30 | Stop reason: HOSPADM

## 2024-10-27 RX ORDER — KETOROLAC TROMETHAMINE 15 MG/ML
15 INJECTION, SOLUTION INTRAMUSCULAR; INTRAVENOUS EVERY 6 HOURS PRN
Status: DISCONTINUED | OUTPATIENT
Start: 2024-10-27 | End: 2024-10-30 | Stop reason: HOSPADM

## 2024-10-27 RX ADMIN — LIOTHYRONINE SODIUM 5 MCG: 10 INJECTION, SOLUTION INTRAVENOUS at 22:10

## 2024-10-27 RX ADMIN — SODIUM CHLORIDE, PRESERVATIVE FREE 10 ML: 5 INJECTION INTRAVENOUS at 22:11

## 2024-10-27 RX ADMIN — FUROSEMIDE 20 MG: 20 TABLET ORAL at 22:10

## 2024-10-27 RX ADMIN — MORPHINE SULFATE 2 MG: 2 INJECTION, SOLUTION INTRAMUSCULAR; INTRAVENOUS at 20:05

## 2024-10-27 RX ADMIN — LEVOTHYROXINE SODIUM ANHYDROUS 400 MCG: 100 INJECTION, POWDER, LYOPHILIZED, FOR SOLUTION INTRAVENOUS at 22:11

## 2024-10-27 RX ADMIN — IOPAMIDOL 100 ML: 612 INJECTION, SOLUTION INTRAVENOUS at 16:05

## 2024-10-27 RX ADMIN — KETOROLAC TROMETHAMINE 15 MG: 15 INJECTION, SOLUTION INTRAMUSCULAR; INTRAVENOUS at 22:14

## 2024-10-27 ASSESSMENT — PAIN DESCRIPTION - LOCATION
LOCATION: ABDOMEN

## 2024-10-27 ASSESSMENT — PAIN DESCRIPTION - ORIENTATION
ORIENTATION: MID;LOWER
ORIENTATION: MID

## 2024-10-27 ASSESSMENT — PAIN DESCRIPTION - DESCRIPTORS
DESCRIPTORS: DISCOMFORT
DESCRIPTORS: SORE;ACHING

## 2024-10-27 ASSESSMENT — PAIN SCALES - GENERAL
PAINLEVEL_OUTOF10: 8
PAINLEVEL_OUTOF10: 8
PAINLEVEL_OUTOF10: 0
PAINLEVEL_OUTOF10: 8

## 2024-10-27 ASSESSMENT — PAIN - FUNCTIONAL ASSESSMENT: PAIN_FUNCTIONAL_ASSESSMENT: 0-10

## 2024-10-27 NOTE — ED PROVIDER NOTES
and headaches.   All other systems reviewed and are negative.      Physical Exam     Vitals:    10/27/24 1415 10/27/24 2005   BP: (!) 178/95    Pulse: 88    Resp: 17 19   Temp: 98.5 °F (36.9 °C)    SpO2: 99%    Weight: 136.1 kg (300 lb)    Height: 1.549 m (5' 1\")        Physical Exam  Vitals and nursing note reviewed.   Constitutional:       Appearance: Normal appearance.      Comments: Pt in NAD   HENT:      Head: Normocephalic and atraumatic.   Eyes:      General: No scleral icterus.  Cardiovascular:      Rate and Rhythm: Normal rate and regular rhythm.      Pulses: No decreased pulses.   Pulmonary:      Effort: Pulmonary effort is normal.      Breath sounds: Normal breath sounds and air entry.   Abdominal:      General: There is no distension.      Palpations: Abdomen is soft.      Tenderness: There is abdominal tenderness in the periumbilical area.      Comments: Abdomen soft  Nondistended  No guarding or rigidity   Musculoskeletal:      Cervical back: Full passive range of motion without pain.      Right lower leg: No edema.      Left lower leg: No edema.   Skin:     General: Skin is warm and dry.      Capillary Refill: Capillary refill takes less than 2 seconds.   Neurological:      Mental Status: She is alert and oriented to person, place, and time. Mental status is at baseline.   Psychiatric:         Attention and Perception: Attention normal.         Diagnostic Study Results     Labs -     Recent Results (from the past 12 hour(s))   CBC with Auto Differential    Collection Time: 10/27/24  2:28 PM   Result Value Ref Range    WBC 8.2 4.6 - 13.2 K/uL    RBC 4.26 4.20 - 5.30 M/uL    Hemoglobin 11.4 (L) 12.0 - 16.0 g/dL    Hematocrit 37.1 35.0 - 45.0 %    MCV 87.1 78.0 - 100.0 FL    MCH 26.8 24.0 - 34.0 PG    MCHC 30.7 (L) 31.0 - 37.0 g/dL    RDW 15.8 (H) 11.6 - 14.5 %    Platelets 249 135 - 420 K/uL    MPV 12.2 (H) 9.2 - 11.8 FL    Nucleated RBCs 0.0 0  WBC    nRBC 0.00 0.00 - 0.01 K/uL    Neutrophils %

## 2024-10-27 NOTE — ED TRIAGE NOTES
Pt brought by EMS from home complaining of hypogastric pain started this AM. Pt also stated that she has her menstrual period accompanied with nausea , denies any pain in urination

## 2024-10-27 NOTE — ED NOTES
Pt moved to room; pt states \"get me out of this bed, I'm going home. I want to leave\"  ALIN Mason made aware

## 2024-10-28 ENCOUNTER — APPOINTMENT (OUTPATIENT)
Facility: HOSPITAL | Age: 34
DRG: 059 | End: 2024-10-28
Payer: MEDICAID

## 2024-10-28 LAB
ANION GAP SERPL CALC-SCNC: 5 MMOL/L (ref 3–18)
ARTERIAL PATENCY WRIST A: POSITIVE
BASE EXCESS BLD CALC-SCNC: 4.1 MMOL/L
BASOPHILS # BLD: 0.1 K/UL (ref 0–0.1)
BASOPHILS NFR BLD: 1 % (ref 0–2)
BDY SITE: ABNORMAL
BODY TEMPERATURE: 98.6
BUN SERPL-MCNC: 15 MG/DL (ref 7–18)
BUN/CREAT SERPL: 15 (ref 12–20)
CALCIUM SERPL-MCNC: 9 MG/DL (ref 8.5–10.1)
CHLORIDE SERPL-SCNC: 97 MMOL/L (ref 100–111)
CO2 SERPL-SCNC: 31 MMOL/L (ref 21–32)
CREAT SERPL-MCNC: 1.03 MG/DL (ref 0.6–1.3)
DIFFERENTIAL METHOD BLD: ABNORMAL
ECHO AV PEAK GRADIENT: 10 MMHG
ECHO AV PEAK VELOCITY: 1.6 M/S
ECHO BSA: 2.47 M2
ECHO EST RA PRESSURE: 3 MMHG
ECHO LV EDV A2C: 70 ML
ECHO LV EDV A4C: 88 ML
ECHO LV EDV BP: 80 ML (ref 56–104)
ECHO LV EDV INDEX A4C: 38 ML/M2
ECHO LV EDV INDEX BP: 35 ML/M2
ECHO LV EDV NDEX A2C: 31 ML/M2
ECHO LV EJECTION FRACTION A2C: 79 %
ECHO LV EJECTION FRACTION A4C: 78 %
ECHO LV EJECTION FRACTION BIPLANE: 78 % (ref 55–100)
ECHO LV ESV A2C: 14 ML
ECHO LV ESV A4C: 19 ML
ECHO LV ESV BP: 17 ML (ref 19–49)
ECHO LV ESV INDEX A2C: 6 ML/M2
ECHO LV ESV INDEX A4C: 8 ML/M2
ECHO LV ESV INDEX BP: 7 ML/M2
ECHO LV FRACTIONAL SHORTENING: 41 % (ref 28–44)
ECHO LV INTERNAL DIMENSION DIASTOLE INDEX: 1.62 CM/M2
ECHO LV INTERNAL DIMENSION DIASTOLIC: 3.7 CM (ref 3.9–5.3)
ECHO LV INTERNAL DIMENSION SYSTOLIC INDEX: 0.96 CM/M2
ECHO LV INTERNAL DIMENSION SYSTOLIC: 2.2 CM
ECHO LV IVSD: 1.1 CM (ref 0.6–0.9)
ECHO LV MASS 2D: 120.8 G (ref 67–162)
ECHO LV MASS INDEX 2D: 52.7 G/M2 (ref 43–95)
ECHO LV POSTERIOR WALL DIASTOLIC: 1 CM (ref 0.6–0.9)
ECHO LV RELATIVE WALL THICKNESS RATIO: 0.54
ECHO PV MAX VELOCITY: 1.1 M/S
ECHO PV PEAK GRADIENT: 5 MMHG
ECHO RA AREA 4C: 8.3 CM2
ECHO RA END SYSTOLIC VOLUME APICAL 4 CHAMBER INDEX BSA: 7 ML/M2
ECHO RA VOLUME AREA LENGTH APICAL 4 CHAMBER: 16 ML
ECHO RA VOLUME: 16 ML
ECHO RV BASAL DIMENSION: 3.1 CM
ECHO RV TAPSE: 1.5 CM (ref 1.7–?)
EOSINOPHIL # BLD: 0.2 K/UL (ref 0–0.4)
EOSINOPHIL NFR BLD: 2 % (ref 0–5)
ERYTHROCYTE [DISTWIDTH] IN BLOOD BY AUTOMATED COUNT: 15.8 % (ref 11.6–14.5)
GAS FLOW.O2 O2 DELIVERY SYS: ABNORMAL
GLUCOSE SERPL-MCNC: 107 MG/DL (ref 74–99)
HCO3 BLD-SCNC: 29 MMOL/L (ref 21–28)
HCT VFR BLD AUTO: 36.8 % (ref 35–45)
HGB BLD-MCNC: 11 G/DL (ref 12–16)
IMM GRANULOCYTES # BLD AUTO: 0.1 K/UL (ref 0–0.04)
IMM GRANULOCYTES NFR BLD AUTO: 1 % (ref 0–0.5)
LACTATE SERPL-SCNC: 1.5 MMOL/L (ref 0.4–2)
LYMPHOCYTES # BLD: 1.7 K/UL (ref 0.9–3.6)
LYMPHOCYTES NFR BLD: 19 % (ref 21–52)
MCH RBC QN AUTO: 26.6 PG (ref 24–34)
MCHC RBC AUTO-ENTMCNC: 29.9 G/DL (ref 31–37)
MCV RBC AUTO: 88.9 FL (ref 78–100)
MONOCYTES # BLD: 0.7 K/UL (ref 0.05–1.2)
MONOCYTES NFR BLD: 8 % (ref 3–10)
NEUTS SEG # BLD: 6.1 K/UL (ref 1.8–8)
NEUTS SEG NFR BLD: 69 % (ref 40–73)
NRBC # BLD: 0 K/UL (ref 0–0.01)
NRBC BLD-RTO: 0 PER 100 WBC
PCO2 BLD: 44.1 MMHG (ref 35–48)
PEEP RESPIRATORY: 16 CMH2O
PH BLD: 7.43 (ref 7.35–7.45)
PLATELET # BLD AUTO: 255 K/UL (ref 135–420)
PMV BLD AUTO: 12 FL (ref 9.2–11.8)
PO2 BLD: 91 MMHG (ref 83–108)
POTASSIUM SERPL-SCNC: 3.3 MMOL/L (ref 3.5–5.5)
RBC # BLD AUTO: 4.14 M/UL (ref 4.2–5.3)
SAO2 % BLD: 97.1 % (ref 92–97)
SERVICE CMNT-IMP: ABNORMAL
SODIUM SERPL-SCNC: 133 MMOL/L (ref 136–145)
SPECIMEN TYPE: ABNORMAL
T4 FREE SERPL-MCNC: 0.5 NG/DL (ref 0.7–1.5)
WBC # BLD AUTO: 8.8 K/UL (ref 4.6–13.2)

## 2024-10-28 PROCEDURE — 2500000003 HC RX 250 WO HCPCS: Performed by: INTERNAL MEDICINE

## 2024-10-28 PROCEDURE — 99254 IP/OBS CNSLTJ NEW/EST MOD 60: CPT

## 2024-10-28 PROCEDURE — 6360000002 HC RX W HCPCS: Performed by: PHYSICIAN ASSISTANT

## 2024-10-28 PROCEDURE — 36415 COLL VENOUS BLD VENIPUNCTURE: CPT

## 2024-10-28 PROCEDURE — 82803 BLOOD GASES ANY COMBINATION: CPT

## 2024-10-28 PROCEDURE — 84439 ASSAY OF FREE THYROXINE: CPT

## 2024-10-28 PROCEDURE — 36600 WITHDRAWAL OF ARTERIAL BLOOD: CPT

## 2024-10-28 PROCEDURE — 6370000000 HC RX 637 (ALT 250 FOR IP): Performed by: PHYSICIAN ASSISTANT

## 2024-10-28 PROCEDURE — 6360000002 HC RX W HCPCS: Performed by: INTERNAL MEDICINE

## 2024-10-28 PROCEDURE — 2580000003 HC RX 258: Performed by: PHYSICIAN ASSISTANT

## 2024-10-28 PROCEDURE — C8924 2D TTE W OR W/O FOL W/CON,FU: HCPCS

## 2024-10-28 PROCEDURE — 6360000004 HC RX CONTRAST MEDICATION: Performed by: HOSPITALIST

## 2024-10-28 PROCEDURE — 94761 N-INVAS EAR/PLS OXIMETRY MLT: CPT

## 2024-10-28 PROCEDURE — 1100000003 HC PRIVATE W/ TELEMETRY

## 2024-10-28 PROCEDURE — 85025 COMPLETE CBC W/AUTO DIFF WBC: CPT

## 2024-10-28 PROCEDURE — 99233 SBSQ HOSP IP/OBS HIGH 50: CPT | Performed by: STUDENT IN AN ORGANIZED HEALTH CARE EDUCATION/TRAINING PROGRAM

## 2024-10-28 PROCEDURE — 80048 BASIC METABOLIC PNL TOTAL CA: CPT

## 2024-10-28 RX ORDER — HYDROMORPHONE HYDROCHLORIDE 1 MG/ML
1 INJECTION, SOLUTION INTRAMUSCULAR; INTRAVENOUS; SUBCUTANEOUS ONCE
Status: COMPLETED | OUTPATIENT
Start: 2024-10-28 | End: 2024-10-28

## 2024-10-28 RX ADMIN — KETOROLAC TROMETHAMINE 15 MG: 15 INJECTION, SOLUTION INTRAMUSCULAR; INTRAVENOUS at 22:19

## 2024-10-28 RX ADMIN — LIOTHYRONINE SODIUM 5 MCG: 10 INJECTION, SOLUTION INTRAVENOUS at 17:36

## 2024-10-28 RX ADMIN — SODIUM CHLORIDE, PRESERVATIVE FREE 10 ML: 5 INJECTION INTRAVENOUS at 21:14

## 2024-10-28 RX ADMIN — PANTOPRAZOLE SODIUM 40 MG: 40 TABLET, DELAYED RELEASE ORAL at 10:54

## 2024-10-28 RX ADMIN — LIOTHYRONINE SODIUM 5 MCG: 10 INJECTION, SOLUTION INTRAVENOUS at 05:02

## 2024-10-28 RX ADMIN — ENOXAPARIN SODIUM 30 MG: 100 INJECTION SUBCUTANEOUS at 21:14

## 2024-10-28 RX ADMIN — POTASSIUM CHLORIDE 40 MEQ: 1500 TABLET, EXTENDED RELEASE ORAL at 21:14

## 2024-10-28 RX ADMIN — ESCITALOPRAM 10 MG: 10 TABLET, FILM COATED ORAL at 10:54

## 2024-10-28 RX ADMIN — LEVOTHYROXINE SODIUM ANHYDROUS 300 MCG: 100 INJECTION, POWDER, LYOPHILIZED, FOR SOLUTION INTRAVENOUS at 10:53

## 2024-10-28 RX ADMIN — PERFLUTREN 2 ML: 6.52 INJECTION, SUSPENSION INTRAVENOUS at 11:54

## 2024-10-28 RX ADMIN — FERROUS SULFATE TAB 325 MG (65 MG ELEMENTAL FE) 325 MG: 325 (65 FE) TAB at 10:54

## 2024-10-28 RX ADMIN — LOSARTAN POTASSIUM 50 MG: 50 TABLET, FILM COATED ORAL at 10:54

## 2024-10-28 RX ADMIN — ENOXAPARIN SODIUM 30 MG: 100 INJECTION SUBCUTANEOUS at 10:53

## 2024-10-28 RX ADMIN — FUROSEMIDE 20 MG: 20 TABLET ORAL at 10:54

## 2024-10-28 RX ADMIN — HYDROMORPHONE HYDROCHLORIDE 1 MG: 1 INJECTION, SOLUTION INTRAMUSCULAR; INTRAVENOUS; SUBCUTANEOUS at 23:18

## 2024-10-28 RX ADMIN — SODIUM CHLORIDE, PRESERVATIVE FREE 10 ML: 5 INJECTION INTRAVENOUS at 11:03

## 2024-10-28 ASSESSMENT — PAIN DESCRIPTION - LOCATION
LOCATION: ABDOMEN
LOCATION: ABDOMEN

## 2024-10-28 ASSESSMENT — PAIN SCALES - GENERAL
PAINLEVEL_OUTOF10: 0
PAINLEVEL_OUTOF10: 8
PAINLEVEL_OUTOF10: 8
PAINLEVEL_OUTOF10: 0

## 2024-10-28 ASSESSMENT — PAIN - FUNCTIONAL ASSESSMENT
PAIN_FUNCTIONAL_ASSESSMENT: PREVENTS OR INTERFERES SOME ACTIVE ACTIVITIES AND ADLS
PAIN_FUNCTIONAL_ASSESSMENT: PREVENTS OR INTERFERES SOME ACTIVE ACTIVITIES AND ADLS

## 2024-10-28 ASSESSMENT — PAIN DESCRIPTION - PAIN TYPE
TYPE: ACUTE PAIN
TYPE: ACUTE PAIN

## 2024-10-28 ASSESSMENT — PAIN DESCRIPTION - FREQUENCY
FREQUENCY: INTERMITTENT
FREQUENCY: INTERMITTENT

## 2024-10-28 ASSESSMENT — PAIN DESCRIPTION - DESCRIPTORS
DESCRIPTORS: DISCOMFORT
DESCRIPTORS: DISCOMFORT

## 2024-10-28 ASSESSMENT — PAIN DESCRIPTION - ORIENTATION
ORIENTATION: MID
ORIENTATION: MID

## 2024-10-28 NOTE — RT PROTOCOL NOTE
Scheduled abg obtained. Results are as follows ph 7.427, c02 44.1, pa02 90.6, hc03 29, sp02 97. Patient very drowsy. Will wake up and goes right back to sleep. Patient comfortable at time of assessment with no signs of distress

## 2024-10-28 NOTE — CONSULTS
Endocrinology Consultation Note    Hosptial Problem List:   Principal Problem:    Severe hypothyroidism  Active Problems:    Depression    Generalized abdominal pain    Methamphetamine abuse (HCC)    Chronic heart failure with preserved ejection fraction (HCC)    Primary hypertension    Hepatic steatosis    Hashimoto thyroiditis  Resolved Problems:    * No resolved hospital problems. *      Impressions:     This is a 34-year-old woman with a medical history significant for depression, HTN, HFpEF, substance use/abuse, and hypothyroidism (2009) who was admitted for abdominal pain and found to have a TSH of 83.90 and free T4 of 0.1, consistent with a severe hypothyroidism.  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  Overall, she has had a long history of having elevated TSH levels that are documented as far back as 2011 and has been on varying doses of levothyroxine with most recent dose levothyroxine 300 mcg p.o. daily and Cytomel 25 mcg p.o. twice daily (June 26, 2024).  Patient also has had a significant history of noncompliance with her levothyroxine, so most likely related to not taking her medication; however, given her hypothyroidism, she may have significant gut edema that has kept her from absorbing any oral levothyroxine.  At this time patient will benefit from repletion of free T4 to normal reference ranges utilizing IV levothyroxine.    The difference between severe hypothyroidism and myxedema coma is the severity of clinical symptoms.  While she has had longstanding hypothyroidism, myxedema coma is an endocrine emergency and has mortality rate that can go upwards to 40%, so important to have a low threshold for aggressive treatment and ICU monitoring.    Recommendations:     -Would give levothyroxine 400 mcg IV x 1 dose (this was twice her reported levothyroxine 200 mcg p.o. daily dose).    - Would give levothyroxine 300 mcg IV daily 
     Head: Normocephalic and atraumatic.   Cardiovascular:      Pulses: Normal pulses.   Pulmonary:      Effort: Pulmonary effort is normal.      Comments: Patient currently on CPAP  Abdominal:      Tenderness: There is abdominal tenderness.      Comments: Patient withdrew from palpation of upper quadrants of the abdomen.  I was unable to palpate the hernia on exam   Neurological:      Comments: Patient is somnolent and difficult to arouse.            Carlene To PA-C

## 2024-10-28 NOTE — ED NOTES
Verbal shift change report given to Sandra RN (oncoming nurse) by Milan RN (offgoing nurse). Report included the following information Nurse Handoff Report, ED Encounter Summary, and ED SBAR.     No further questions at this time.

## 2024-10-28 NOTE — CARE COORDINATION
10/28/24 1537   Service Assessment   Patient Orientation Alert and Oriented;Person;Place;Situation   Cognition Alert   History Provided By Patient   Primary Caregiver Self   Support Systems Family Members   Patient's Healthcare Decision Maker is: Legal Next of Kin   PCP Verified by CM Yes   Last Visit to PCP Within last 6 months   Prior Functional Level Independent in ADLs/IADLs   Current Functional Level Independent in ADLs/IADLs   Can patient return to prior living arrangement Yes   Ability to make needs known: Good   Family able to assist with home care needs: Yes   Would you like for me to discuss the discharge plan with any other family members/significant others, and if so, who? No   Financial Resources Medicaid   Community Resources None   Social/Functional History   Lives With   (Duplex)   Type of Home Apartment   Home Layout Two level   Home Access Stairs to enter without rails   Entrance Stairs - Number of Steps 4   Bathroom Shower/Tub Tub/Shower unit   Bathroom Toilet Standard   Bathroom Equipment None   Bathroom Accessibility Accessible   Home Equipment None   Receives Help From Family   ADL Assistance Independent   Homemaking Assistance Independent   Homemaking Responsibilities Yes   Ambulation Assistance Independent   Transfer Assistance Independent   Mode of Transportation Car  ([employer]/ [employer])   Occupation Full time employment   Discharge Planning   Type of Residence Apartment   Living Arrangements Family Members   Current Services Prior To Admission C-pap   Potential Assistance Needed N/A   DME Ordered? No   Potential Assistance Purchasing Medications No   Type of Home Care Services None   Patient expects to be discharged to: Apartment   One/Two Story Residence Two story   # of Interior Steps 14   Interior Rails Both   Lift Chair Available No   History of falls? 0   Services At/After Discharge   Transition of Care Consult (CM Consult) Discharge Planning   Services At/After Discharge None

## 2024-10-28 NOTE — ED NOTES
Cardiac monitor in place. Pt voiced no concerns at this time. Pt easy to arouse, alert and orientated. Allergies verified. Medicated as per MAR. Resperations even and unlabored. Call bell within reach.

## 2024-10-28 NOTE — H&P
- 75 U/L   HCG Qualitative, Serum    Collection Time: 10/27/24  2:28 PM   Result Value Ref Range    Preg, Serum Negative NEG     T4, Free    Collection Time: 10/27/24  2:28 PM   Result Value Ref Range    T4 Free 0.1 (L) 0.7 - 1.5 NG/DL   TSH    Collection Time: 10/27/24  2:28 PM   Result Value Ref Range    TSH, 3rd Generation 83.90 (H) 0.36 - 3.74 uIU/mL   Venous Blood Gas, POC    Collection Time: 10/27/24  7:40 PM   Result Value Ref Range    DEVICE ROOM AIR      PH, VENOUS (POC) 7.40 7.32 - 7.42      PCO2, Tarkio, POC 53.9 (H) 41 - 51 MMHG    PO2, VENOUS (POC) 23 (L) 25 - 40 mmHg    HCO3, Venous 33.6 (H) 23.0 - 28.0 MMOL/L    SO2, VENOUS (POC) 38.9 (L) 65 - 88 %    BASE EXCESS, VENOUS (POC) 7.4 mmol/L    Specimen type: VENOUS BLOOD      Performed by: Tyra Luong        Imaging Reviewed:  CT ABDOMEN PELVIS W IV CONTRAST Additional Contrast? None    Result Date: 10/27/2024  CT ABDOMEN AND PELVIS ENHANCED CPT CODE: 39509 and [id number] INDICATION: Periumbilical abdominal pain.. TECHNIQUE: 5 mm collimation axial images obtained from the diaphragm to the level of the pubic symphysis following the uneventful administration of nonionic intravenous contrast. All CT scans at this facility are performed using dose optimization technique as appropriate to this specific exam, to include automated exposure control, adjustment of the mA and/or KP according to patient size or use of iterative reconstruction techniques. COMPARISON: Prior CT 3/21/2024.  ABDOMEN FINDINGS: Small volume presumed dependent atelectasis at the lung bases bilaterally. Liver is enlarged measuring up to 22 cm craniocaudal. Diffuse low attenuation suggests underlying steatosis. Trace perihepatic fluid. Gallbladder not distended. No calcified stones. Spleen unremarkable. Pancreas unremarkable. Adrenal glands unremarkable. Abdominal aorta not aneurysmal. No evidence of dissection. Symmetric renal enhancement. No hydronephrosis. No calcified stones PELVIS FINDINGS:

## 2024-10-28 NOTE — ED NOTES
Assumed care of PT.  Pt easy to arouse, alert, and orientated. PT voiced no concerns and is not in distress at this time. Breathing even and unlabored. Call bell within reach.     Pt had one episode of bladder incontinence. Pt cleaned, sheets changed, pt positioned for comfort, and blankets given.

## 2024-10-29 LAB
ANION GAP SERPL CALC-SCNC: 5 MMOL/L (ref 3–18)
BASOPHILS # BLD: 0 K/UL (ref 0–0.1)
BASOPHILS NFR BLD: 1 % (ref 0–2)
BUN SERPL-MCNC: 22 MG/DL (ref 7–18)
BUN/CREAT SERPL: 19 (ref 12–20)
CALCIUM SERPL-MCNC: 8.9 MG/DL (ref 8.5–10.1)
CHLORIDE SERPL-SCNC: 100 MMOL/L (ref 100–111)
CO2 SERPL-SCNC: 31 MMOL/L (ref 21–32)
CREAT SERPL-MCNC: 1.14 MG/DL (ref 0.6–1.3)
DIFFERENTIAL METHOD BLD: ABNORMAL
EOSINOPHIL # BLD: 0.2 K/UL (ref 0–0.4)
EOSINOPHIL NFR BLD: 2 % (ref 0–5)
ERYTHROCYTE [DISTWIDTH] IN BLOOD BY AUTOMATED COUNT: 15.9 % (ref 11.6–14.5)
GLUCOSE SERPL-MCNC: 123 MG/DL (ref 74–99)
HCT VFR BLD AUTO: 35.4 % (ref 35–45)
HGB BLD-MCNC: 10.6 G/DL (ref 12–16)
IMM GRANULOCYTES # BLD AUTO: 0.1 K/UL (ref 0–0.04)
IMM GRANULOCYTES NFR BLD AUTO: 1 % (ref 0–0.5)
LYMPHOCYTES # BLD: 1.9 K/UL (ref 0.9–3.6)
LYMPHOCYTES NFR BLD: 25 % (ref 21–52)
MCH RBC QN AUTO: 26.4 PG (ref 24–34)
MCHC RBC AUTO-ENTMCNC: 29.9 G/DL (ref 31–37)
MCV RBC AUTO: 88.3 FL (ref 78–100)
MONOCYTES # BLD: 0.6 K/UL (ref 0.05–1.2)
MONOCYTES NFR BLD: 8 % (ref 3–10)
NEUTS SEG # BLD: 4.9 K/UL (ref 1.8–8)
NEUTS SEG NFR BLD: 64 % (ref 40–73)
NRBC # BLD: 0 K/UL (ref 0–0.01)
NRBC BLD-RTO: 0 PER 100 WBC
PLATELET # BLD AUTO: 263 K/UL (ref 135–420)
PMV BLD AUTO: 12.5 FL (ref 9.2–11.8)
POTASSIUM SERPL-SCNC: 4.1 MMOL/L (ref 3.5–5.5)
RBC # BLD AUTO: 4.01 M/UL (ref 4.2–5.3)
SODIUM SERPL-SCNC: 136 MMOL/L (ref 136–145)
T4 FREE SERPL-MCNC: 0.6 NG/DL (ref 0.7–1.5)
WBC # BLD AUTO: 7.6 K/UL (ref 4.6–13.2)

## 2024-10-29 PROCEDURE — 6370000000 HC RX 637 (ALT 250 FOR IP): Performed by: PHYSICIAN ASSISTANT

## 2024-10-29 PROCEDURE — 2500000003 HC RX 250 WO HCPCS: Performed by: STUDENT IN AN ORGANIZED HEALTH CARE EDUCATION/TRAINING PROGRAM

## 2024-10-29 PROCEDURE — 99232 SBSQ HOSP IP/OBS MODERATE 35: CPT | Performed by: SURGERY

## 2024-10-29 PROCEDURE — 99233 SBSQ HOSP IP/OBS HIGH 50: CPT | Performed by: STUDENT IN AN ORGANIZED HEALTH CARE EDUCATION/TRAINING PROGRAM

## 2024-10-29 PROCEDURE — 2580000003 HC RX 258: Performed by: PHYSICIAN ASSISTANT

## 2024-10-29 PROCEDURE — 85025 COMPLETE CBC W/AUTO DIFF WBC: CPT

## 2024-10-29 PROCEDURE — 6360000002 HC RX W HCPCS: Performed by: PHYSICIAN ASSISTANT

## 2024-10-29 PROCEDURE — 80048 BASIC METABOLIC PNL TOTAL CA: CPT

## 2024-10-29 PROCEDURE — 1100000003 HC PRIVATE W/ TELEMETRY

## 2024-10-29 PROCEDURE — 36415 COLL VENOUS BLD VENIPUNCTURE: CPT

## 2024-10-29 PROCEDURE — 2500000003 HC RX 250 WO HCPCS: Performed by: INTERNAL MEDICINE

## 2024-10-29 PROCEDURE — 94660 CPAP INITIATION&MGMT: CPT

## 2024-10-29 PROCEDURE — 84439 ASSAY OF FREE THYROXINE: CPT

## 2024-10-29 RX ORDER — LIOTHYRONINE SODIUM 10 UG/ML
5 INJECTION, SOLUTION INTRAVENOUS EVERY 8 HOURS
Status: DISCONTINUED | OUTPATIENT
Start: 2024-10-29 | End: 2024-10-30

## 2024-10-29 RX ADMIN — FERROUS SULFATE TAB 325 MG (65 MG ELEMENTAL FE) 325 MG: 325 (65 FE) TAB at 09:00

## 2024-10-29 RX ADMIN — LEVOTHYROXINE SODIUM ANHYDROUS 300 MCG: 100 INJECTION, POWDER, LYOPHILIZED, FOR SOLUTION INTRAVENOUS at 06:16

## 2024-10-29 RX ADMIN — ESCITALOPRAM 10 MG: 10 TABLET, FILM COATED ORAL at 09:00

## 2024-10-29 RX ADMIN — LIOTHYRONINE SODIUM 5 MCG: 10 INJECTION, SOLUTION INTRAVENOUS at 01:14

## 2024-10-29 RX ADMIN — FUROSEMIDE 20 MG: 20 TABLET ORAL at 09:00

## 2024-10-29 RX ADMIN — SODIUM CHLORIDE, PRESERVATIVE FREE 10 ML: 5 INJECTION INTRAVENOUS at 09:01

## 2024-10-29 RX ADMIN — SODIUM CHLORIDE, PRESERVATIVE FREE 10 ML: 5 INJECTION INTRAVENOUS at 21:43

## 2024-10-29 RX ADMIN — ACETAMINOPHEN 650 MG: 325 TABLET ORAL at 09:03

## 2024-10-29 RX ADMIN — LIOTHYRONINE SODIUM 5 MCG: 10 INJECTION, SOLUTION INTRAVENOUS at 17:34

## 2024-10-29 RX ADMIN — LOSARTAN POTASSIUM 50 MG: 50 TABLET, FILM COATED ORAL at 09:00

## 2024-10-29 RX ADMIN — ENOXAPARIN SODIUM 30 MG: 100 INJECTION SUBCUTANEOUS at 09:01

## 2024-10-29 RX ADMIN — ENOXAPARIN SODIUM 30 MG: 100 INJECTION SUBCUTANEOUS at 21:40

## 2024-10-29 RX ADMIN — PANTOPRAZOLE SODIUM 40 MG: 40 TABLET, DELAYED RELEASE ORAL at 06:16

## 2024-10-29 RX ADMIN — FUROSEMIDE 20 MG: 20 TABLET ORAL at 17:36

## 2024-10-29 ASSESSMENT — PAIN DESCRIPTION - LOCATION
LOCATION: ABDOMEN
LOCATION: ABDOMEN

## 2024-10-29 ASSESSMENT — PAIN SCALES - GENERAL
PAINLEVEL_OUTOF10: 3
PAINLEVEL_OUTOF10: 0
PAINLEVEL_OUTOF10: 7
PAINLEVEL_OUTOF10: 0
PAINLEVEL_OUTOF10: 0

## 2024-10-29 ASSESSMENT — ENCOUNTER SYMPTOMS
SHORTNESS OF BREATH: 0
VOMITING: 0
ABDOMINAL PAIN: 1
NAUSEA: 0
CHEST TIGHTNESS: 0

## 2024-10-29 ASSESSMENT — PAIN DESCRIPTION - ORIENTATION: ORIENTATION: MID

## 2024-10-29 ASSESSMENT — PAIN - FUNCTIONAL ASSESSMENT: PAIN_FUNCTIONAL_ASSESSMENT: PREVENTS OR INTERFERES SOME ACTIVE ACTIVITIES AND ADLS

## 2024-10-29 ASSESSMENT — PAIN DESCRIPTION - DESCRIPTORS: DESCRIPTORS: DISCOMFORT

## 2024-10-29 ASSESSMENT — PAIN DESCRIPTION - FREQUENCY: FREQUENCY: INTERMITTENT

## 2024-10-29 ASSESSMENT — PAIN DESCRIPTION - ONSET: ONSET: GRADUAL

## 2024-10-29 ASSESSMENT — PAIN DESCRIPTION - PAIN TYPE: TYPE: ACUTE PAIN

## 2024-10-30 VITALS
TEMPERATURE: 97.8 F | OXYGEN SATURATION: 97 % | SYSTOLIC BLOOD PRESSURE: 140 MMHG | HEART RATE: 77 BPM | BODY MASS INDEX: 55.32 KG/M2 | RESPIRATION RATE: 20 BRPM | HEIGHT: 61 IN | WEIGHT: 293 LBS | DIASTOLIC BLOOD PRESSURE: 63 MMHG

## 2024-10-30 LAB
ANION GAP SERPL CALC-SCNC: 7 MMOL/L (ref 3–18)
BASOPHILS # BLD: 0 K/UL (ref 0–0.1)
BASOPHILS NFR BLD: 1 % (ref 0–2)
BUN SERPL-MCNC: 17 MG/DL (ref 7–18)
BUN/CREAT SERPL: 20 (ref 12–20)
CALCIUM SERPL-MCNC: 9.4 MG/DL (ref 8.5–10.1)
CHLORIDE SERPL-SCNC: 100 MMOL/L (ref 100–111)
CO2 SERPL-SCNC: 32 MMOL/L (ref 21–32)
CREAT SERPL-MCNC: 0.86 MG/DL (ref 0.6–1.3)
DIFFERENTIAL METHOD BLD: ABNORMAL
EOSINOPHIL # BLD: 0.2 K/UL (ref 0–0.4)
EOSINOPHIL NFR BLD: 2 % (ref 0–5)
ERYTHROCYTE [DISTWIDTH] IN BLOOD BY AUTOMATED COUNT: 16 % (ref 11.6–14.5)
GLUCOSE SERPL-MCNC: 112 MG/DL (ref 74–99)
HCT VFR BLD AUTO: 36.4 % (ref 35–45)
HGB BLD-MCNC: 11.2 G/DL (ref 12–16)
IMM GRANULOCYTES # BLD AUTO: 0.1 K/UL (ref 0–0.04)
IMM GRANULOCYTES NFR BLD AUTO: 1 % (ref 0–0.5)
LYMPHOCYTES # BLD: 1.7 K/UL (ref 0.9–3.6)
LYMPHOCYTES NFR BLD: 21 % (ref 21–52)
MCH RBC QN AUTO: 27.5 PG (ref 24–34)
MCHC RBC AUTO-ENTMCNC: 30.8 G/DL (ref 31–37)
MCV RBC AUTO: 89.2 FL (ref 78–100)
MONOCYTES # BLD: 0.6 K/UL (ref 0.05–1.2)
MONOCYTES NFR BLD: 8 % (ref 3–10)
NEUTS SEG # BLD: 5.6 K/UL (ref 1.8–8)
NEUTS SEG NFR BLD: 69 % (ref 40–73)
NRBC # BLD: 0 K/UL (ref 0–0.01)
NRBC BLD-RTO: 0 PER 100 WBC
PLATELET # BLD AUTO: 270 K/UL (ref 135–420)
PMV BLD AUTO: 12.7 FL (ref 9.2–11.8)
POTASSIUM SERPL-SCNC: 4 MMOL/L (ref 3.5–5.5)
RBC # BLD AUTO: 4.08 M/UL (ref 4.2–5.3)
SODIUM SERPL-SCNC: 139 MMOL/L (ref 136–145)
T3 SERPL-MCNC: <20 NG/DL (ref 71–180)
T4 FREE SERPL-MCNC: 0.8 NG/DL (ref 0.7–1.5)
WBC # BLD AUTO: 8.2 K/UL (ref 4.6–13.2)

## 2024-10-30 PROCEDURE — 2500000003 HC RX 250 WO HCPCS: Performed by: STUDENT IN AN ORGANIZED HEALTH CARE EDUCATION/TRAINING PROGRAM

## 2024-10-30 PROCEDURE — 6370000000 HC RX 637 (ALT 250 FOR IP): Performed by: PHYSICIAN ASSISTANT

## 2024-10-30 PROCEDURE — 2580000003 HC RX 258: Performed by: PHYSICIAN ASSISTANT

## 2024-10-30 PROCEDURE — 6360000002 HC RX W HCPCS: Performed by: PHYSICIAN ASSISTANT

## 2024-10-30 PROCEDURE — 6370000000 HC RX 637 (ALT 250 FOR IP): Performed by: STUDENT IN AN ORGANIZED HEALTH CARE EDUCATION/TRAINING PROGRAM

## 2024-10-30 PROCEDURE — 99239 HOSP IP/OBS DSCHRG MGMT >30: CPT | Performed by: STUDENT IN AN ORGANIZED HEALTH CARE EDUCATION/TRAINING PROGRAM

## 2024-10-30 PROCEDURE — 85025 COMPLETE CBC W/AUTO DIFF WBC: CPT

## 2024-10-30 PROCEDURE — 80048 BASIC METABOLIC PNL TOTAL CA: CPT

## 2024-10-30 PROCEDURE — 36415 COLL VENOUS BLD VENIPUNCTURE: CPT

## 2024-10-30 PROCEDURE — 94761 N-INVAS EAR/PLS OXIMETRY MLT: CPT

## 2024-10-30 PROCEDURE — 84439 ASSAY OF FREE THYROXINE: CPT

## 2024-10-30 RX ORDER — LEVOTHYROXINE SODIUM 100 UG/1
200 TABLET ORAL DAILY
Status: DISCONTINUED | OUTPATIENT
Start: 2024-10-31 | End: 2024-10-30 | Stop reason: HOSPADM

## 2024-10-30 RX ORDER — LIOTHYRONINE SODIUM 5 UG/1
5 TABLET ORAL DAILY
Status: DISCONTINUED | OUTPATIENT
Start: 2024-10-30 | End: 2024-10-30

## 2024-10-30 RX ORDER — LIOTHYRONINE SODIUM 5 UG/1
5 TABLET ORAL 2 TIMES DAILY
Status: DISCONTINUED | OUTPATIENT
Start: 2024-10-30 | End: 2024-10-30 | Stop reason: HOSPADM

## 2024-10-30 RX ADMIN — LIOTHYRONINE SODIUM 5 MCG: 5 TABLET ORAL at 11:27

## 2024-10-30 RX ADMIN — SODIUM CHLORIDE, PRESERVATIVE FREE 10 ML: 5 INJECTION INTRAVENOUS at 09:00

## 2024-10-30 RX ADMIN — PANTOPRAZOLE SODIUM 40 MG: 40 TABLET, DELAYED RELEASE ORAL at 06:40

## 2024-10-30 RX ADMIN — LIOTHYRONINE SODIUM 5 MCG: 10 INJECTION, SOLUTION INTRAVENOUS at 02:06

## 2024-10-30 RX ADMIN — LEVOTHYROXINE SODIUM ANHYDROUS 300 MCG: 100 INJECTION, POWDER, LYOPHILIZED, FOR SOLUTION INTRAVENOUS at 06:42

## 2024-10-30 RX ADMIN — LOSARTAN POTASSIUM 50 MG: 50 TABLET, FILM COATED ORAL at 11:27

## 2024-10-30 RX ADMIN — FERROUS SULFATE TAB 325 MG (65 MG ELEMENTAL FE) 325 MG: 325 (65 FE) TAB at 11:27

## 2024-10-30 RX ADMIN — FUROSEMIDE 20 MG: 20 TABLET ORAL at 11:27

## 2024-10-30 RX ADMIN — ESCITALOPRAM 10 MG: 10 TABLET, FILM COATED ORAL at 11:28

## 2024-10-30 RX ADMIN — ENOXAPARIN SODIUM 30 MG: 100 INJECTION SUBCUTANEOUS at 11:28

## 2024-10-30 ASSESSMENT — PAIN SCALES - GENERAL
PAINLEVEL_OUTOF10: 0

## 2024-10-30 NOTE — PROGRESS NOTES
0720: Bedside shift change report given to CASA Carvajal (oncoming nurse) by CASA Flores   (offgoing nurse). Report included the following information Nurse Handoff Report, Adult Overview, Intake/Output, MAR, and Cardiac Rhythm NSR .     0720: 4 Eyes Skin Assessment     NAME:  Aditya Gusman  YOB: 1990  MEDICAL RECORD NUMBER:  [id number]    The patient is being assessed for  Shift Handoff    I agree that at least one RN has performed a thorough Head to Toe Skin Assessment on the patient. ALL assessment sites listed below have been assessed.      Areas assessed by both nurses:    Head, Face, Ears, Shoulders, Back, Chest, Arms, Elbows, Hands, Sacrum. Buttock, Coccyx, Ischium, Legs. Feet and Heels, and Under Medical Devices         Does the Patient have a Wound? No noted wound(s)       Jeffrey Prevention initiated by RN: No  Wound Care Orders initiated by RN: No    Pressure Injury (Stage 3,4, Unstageable, DTI, NWPT, and Complex wounds) if present, place Wound referral order by RN under : No    New Ostomies, if present place, Ostomy referral order under : No     Nurse 1 eSignature: Electronically signed by Humble Carvajal RN on 10/29/24 at 7:25 AM EDT    **SHARE this note so that the co-signing nurse can place an eSignature**    Nurse 2 eSignature: Electronically signed by CASA Flores on 10/29/24 at 7:25 AM EDT    1925: Bedside shift change report given to CASA Ram (oncoming nurse) by CASA Carvajal (offgoing nurse). Report included the following information Nurse Handoff Report, Adult Overview, Intake/Output, MAR, and Cardiac Rhythm NSR .         
0730: Bedside and Verbal shift change report given to CASA Griffin (oncoming nurse) by CASA Flores (offgoing nurse). Report included the following information Nurse Handoff Report, Adult Overview, Intake/Output, MAR, and Recent Results.     1930: Bedside and Verbal shift change report given to CASA Flores (oncoming nurse) by CASA Griffin (offgoing nurse). Report included the following information Nurse Handoff Report, Adult Overview, Intake/Output, MAR, and Recent Results.     
0730: Bedside and Verbal shift change report given to Marivel RN and Avani RN (oncoming nurse) by Leanna RN (offgoing nurse). Report included the following information Nurse Handoff Report, Adult Overview, Recent Results, Cardiac Rhythm NSR/SB, and Event Log.      1600: pt requested to leave the hospital AMA. MD notified.     1630: Pt educated on risks of leaving the hospital AMA. Pt verbalized understanding. Pt signed AMA form     1650: Pt disconnected from telemetry. PIV taken out    1705: Pt wheeled to front of hospital  
Advance Care Planning   Healthcare Decision Maker:    Primary Decision Maker: Bettie Gusman - Parent - [phone]    Click here to complete Healthcare Decision Makers including selection of the Healthcare Decision Maker Relationship (ie \"Primary\").     conducted an initial consultation and Spiritual Assessment for Aditya Gusman, who is a 34 y.o.,female. Patient's Primary Language is: English.   According to the patient's EMR Jain Affiliation is: None.     The reason the Patient came to the hospital is:   Patient Active Problem List    Diagnosis Date Noted    Severe hypothyroidism 10/27/2024    Chronic diastolic congestive heart failure (HCC) 10/27/2024    Primary hypertension 10/27/2024    Hepatic steatosis 10/27/2024    Hashimoto thyroiditis 10/27/2024    Other specified hypothyroidism 05/20/2024    Leukocytosis 03/23/2024    Methamphetamine abuse (HCC) 03/23/2024    Generalized abdominal pain 03/21/2024    Myxedema coma (HCC) 03/21/2024    Multifocal pneumonia 03/21/2024    MDD (major depressive disorder), recurrent severe, without psychosis (HCC) 03/13/2024    Depression 03/01/2017    Suicide attempt by beta blocker overdose ([facility]) 02/27/2017    Anxiety 02/02/2016    Migraine 07/02/2014    Dysmenorrhea 04/26/2012    Recurrent pregnancy loss, antepartum condition or complication 11/30/2011        The  provided the following Interventions:  Initiated a relationship of care and support. Patient expressed feeling better and thankful to be more alert. Anxious to get home to children. Explored issues of skylar, belief, spirituality and Christianity/ritual needs while hospitalized. Listened empathically. Provided chaplaincy education concerning Advance Medical Directive. Offered prayer and assurance of continued prayers on patient's behalf.   Chart reviewed. Patient shared limited information about both their medical narrative and spiritual journey/beliefs.  confirmed Patient's Jain 
Kranthi Landeros [facility] Hospitalist Group  Progress Note    Patient: Aditya Gusman Age: 34 y.o. : 1990 MR#: [id number] SSN: [id number]  Date/Time: 10/28/2024    Subjective:   Subjective   No acute events overnight, no new concerns or complaints, vitals stable.  Seen sleeping without CPAP on    Review of Systems   Constitutional: Negative for fever.      Assessment/Plan:   Severe hypothyroidism, concern for myxedema coma  Generalized abdominal pain  Abdominal hernia fat-containing  OHS and ART  Depression  Chronic HFpEF  Hypertension  GERD  Chronic anemia  Methamphetamine abuse    Plan  Continue supplementation with levothyroxine and liothyronine at previously stated doses 300 mcg daily and 5 mcg every 8 hour both IV for the time being.  When patient improves will change over to p.o. formulations.  Endocrine is consulted appreciate their assistance as always    Encourage CPAP use whenever patient is taking a nap or likely to sleep.  Have asked the nurses to pay attention to patient to monitor as if she is desaturating then most likely she has fallen asleep without CPAP on.  Please place CPAP in this event.    Disposition: Expected location: Home     Expected discharge date: 10/31/2024      Case discussed with:  [x]Patient  []Family  [x]Nursing  [x]Case Management  DVT Prophylaxis:  [x]Lovenox  []Hep SQ  [x]SCDs  []Coumadin   []On Heparin gtt   [] DOAC    Objective:   Objective:  General Appearance:  Comfortable, in no acute distress, not in pain and ill-appearing.    Vital signs: (most recent): Blood pressure 121/67, pulse 57, temperature 98.4 °F (36.9 °C), temperature source Axillary, resp. rate 12, height 1.549 m (5' 1\"), weight (!) 142 kg (313 lb), SpO2 96%.  No fever.    HEENT: Normal HEENT exam.    Lungs:  Normal effort and normal respiratory rate.  Breath sounds clear to auscultation.  There are decreased breath sounds.    Heart: Bradycardia.  Regular rhythm.  S1 normal and S2 normal.  
Kranthi Landeros [facility] Hospitalist Group  Progress Note    Patient: Aditya Gusman Age: 34 y.o. : 1990 MR#: [id number] SSN: [id number]  Date/Time: 10/29/2024    Subjective:   Subjective   No acute events overnight, no new concerns or complaints, vitals stable.  Still quite somnolent today however does seem to be a little bit better    Review of Systems   Constitutional: Negative for fever.      Assessment/Plan:   Myxedema coma, as determined by myxedema coma scoring and overall clinical gestalt.  Luckily fairly mild  Generalized abdominal pain  Abdominal hernia fat-containing  OHS and ART  Depression  Chronic HFpEF  Hypertension  GERD  Chronic anemia  Methamphetamine abuse    Plan  Continue supplementation with levothyroxine and liothyronine at previously stated doses 300 mcg daily and 5 mcg every 8 hour both IV for the time being.  When patient improves will change over to p.o. formulations.  Hopeful that we will be able to change over to p.o. as soon as tomorrow given somewhat limited supply of T3 in the IV formulation    Continue encouraging use of CPAP whenever patient is sleepy or feels like she may nap.    Disposition: Expected location: Home     Expected discharge date: 10/31/2024      Case discussed with:  [x]Patient  []Family  [x]Nursing  [x]Case Management  DVT Prophylaxis:  [x]Lovenox  []Hep SQ  [x]SCDs  []Coumadin   []On Heparin gtt   [] DOAC    Objective:   Objective:  General Appearance:  Comfortable, in no acute distress, not in pain and ill-appearing.    Vital signs: (most recent): Blood pressure 137/64, pulse 69, temperature 97.3 °F (36.3 °C), temperature source Oral, resp. rate 17, height 1.549 m (5' 1\"), weight (!) 142 kg (313 lb), SpO2 97%.  No fever.    HEENT: Normal HEENT exam.    Lungs:  Normal effort and normal respiratory rate.  Breath sounds clear to auscultation.  There are decreased breath sounds.    Heart: Bradycardia.  Regular rhythm.  S1 normal and S2 normal.  
Patient desaturation into the 80's, RT added 2l bleed in 02.  
Head: Normocephalic.   Cardiovascular:      Rate and Rhythm: Normal rate.   Pulmonary:      Effort: Pulmonary effort is normal.   Abdominal:      General: Abdomen is flat.      Palpations: Abdomen is soft.      Tenderness: There is abdominal tenderness.      Hernia: A hernia is present.      Comments: Mild abdominal tenderness, no guarding or rebound   Neurological:      Mental Status: She is alert.   Psychiatric:         Mood and Affect: Mood normal.         Behavior: Behavior normal.         Thought Content: Thought content normal.         Judgment: Judgment normal.               Cheryl Samano DO  Phone: [phone]

## 2024-10-30 NOTE — PLAN OF CARE
Problem: Discharge Planning  Goal: Discharge to home or other facility with appropriate resources  Outcome: Progressing     Problem: Pain  Goal: Verbalizes/displays adequate comfort level or baseline comfort level  Description: Patient to remain pain free using appropriate pain scale.  Outcome: Progressing     Problem: Safety - Adult  Goal: Free from fall injury  Outcome: Progressing     Problem: Metabolic/Fluid and Electrolytes - Adult  Goal: Electrolytes maintained within normal limits  Outcome: Progressing     Problem: Chronic Conditions and Co-morbidities  Goal: Patient's chronic conditions and co-morbidity symptoms are monitored and maintained or improved  Outcome: Progressing     Problem: Neurosensory - Adult  Goal: Achieves stable or improved neurological status  Outcome: Progressing     Problem: Skin/Tissue Integrity  Goal: Absence of new skin breakdown  Description: 1.  Monitor for areas of redness and/or skin breakdown  2.  Assess vascular access sites hourly  3.  Every 4-6 hours minimum:  Change oxygen saturation probe site  4.  Every 4-6 hours:  If on nasal continuous positive airway pressure, respiratory therapy assess nares and determine need for appliance change or resting period.  Outcome: Progressing     
  Problem: Pain  Goal: Verbalizes/displays adequate comfort level or baseline comfort level  Outcome: Progressing  Flowsheets (Taken 10/28/2024 1615)  Verbalizes/displays adequate comfort level or baseline comfort level:   Encourage patient to monitor pain and request assistance   Administer analgesics based on type and severity of pain and evaluate response  Note: Administer pain medications according to pain scale using Pain Assesment     Problem: Safety - Adult  Goal: Free from fall injury  Outcome: Progressing  Flowsheets (Taken 10/28/2024 1617)  Free From Fall Injury: Instruct family/caregiver on patient safety  Note: Place bed alarm on, hourly rounding, chair alarm when needed to prevent falls     Problem: Metabolic/Fluid and Electrolytes - Adult  Goal: Electrolytes maintained within normal limits  Outcome: Progressing  Flowsheets (Taken 10/28/2024 1617)  Electrolytes maintained within normal limits:   Monitor response to electrolyte replacements, including repeat lab results as appropriate   Monitor labs and assess patient for signs and symptoms of electrolyte imbalances  Note: Monitor electrolytes such as potassium and replace as needed according to electrolyte replacement protocol      
Sandra Schmidt, RN  Outcome: Progressing  Note: Daily labs ordered to monitor patients electrolytes, T4, and TSH levels. Patient has as needed electrolyte protocol replacment orders.      
electrolytes, T4, and TSH levels. Patient has as needed electrolyte protocol replacment orders.   10/28/2024 1617 by Gaby Gusman RN  Outcome: Progressing  Flowsheets (Taken 10/28/2024 1617)  Electrolytes maintained within normal limits:   Monitor response to electrolyte replacements, including repeat lab results as appropriate   Monitor labs and assess patient for signs and symptoms of electrolyte imbalances  Note: Monitor electrolytes such as potassium and replace as needed according to electrolyte replacement protocol      Problem: Chronic Conditions and Co-morbidities  Goal: Patient's chronic conditions and co-morbidity symptoms are monitored and maintained or improved  Outcome: Progressing  Note: Multidisciplinary team involved to address patients acute and chronic conditions. Endocrinology and surgery consulted to assess and treat patients hypothyroidism and findings on CT scan possibly contributing to patients acute abdominal pain. RN reviewed plan of care and treatments done with patient and mother. Patients mother requesting to speak with physicians regarding questions on things she read on patients my chart.

## 2024-10-31 NOTE — TELEPHONE ENCOUNTER
Pt came in the office to r/s hosp f/u due to not feeling well. She is requesting a medication refill    LOV: 4.22.24    NOV: 11.6.24

## 2024-11-01 RX ORDER — LEVOTHYROXINE SODIUM 200 UG/1
200 TABLET ORAL DAILY
Qty: 30 TABLET | Refills: 3 | Status: SHIPPED | OUTPATIENT
Start: 2024-11-01

## 2024-11-01 NOTE — DISCHARGE SUMMARY
CHEST RADIOGRAPH    INDICATION: Shortness of breath. Recently treated for pneumonia.    COMPARISON: Chest radiograph 3/24/2024.    TECHNIQUE: AP view of the chest    FINDINGS:    LINES/TUBES: None.    MEDIASTINUM: Cardiac silhouette is within normal limits.    LUNGS: Assessment is limited due to body habitus, but no definite focal  consolidation, pleural effusion, or pneumothorax.    BONES/OTHER: No acute osseous abnormality.    Impression  No acute cardiopulmonary process.    CAT Scan Result (most recent):  CT ABDOMEN PELVIS W IV CONTRAST 10/27/2024    Narrative  CT ABDOMEN AND PELVIS ENHANCED    CPT CODE: 04965 and 57233    INDICATION: Periumbilical abdominal pain..    TECHNIQUE: 5 mm collimation axial images obtained from the diaphragm to the  level of the pubic symphysis following the uneventful administration of nonionic  intravenous contrast.    All CT scans at this facility are performed using dose optimization technique as  appropriate to this specific exam, to include automated exposure control,  adjustment of the mA and/or KP according to patient size or use of iterative  reconstruction techniques.    COMPARISON: Prior CT 3/21/2024.    ABDOMEN FINDINGS:  Small volume presumed dependent atelectasis at the lung bases bilaterally.  Liver is enlarged measuring up to 22 cm craniocaudal. Diffuse low attenuation  suggests underlying steatosis. Trace perihepatic fluid.  Gallbladder not distended. No calcified stones. Spleen unremarkable. Pancreas  unremarkable. Adrenal glands unremarkable.  Abdominal aorta not aneurysmal. No evidence of dissection.  Symmetric renal enhancement. No hydronephrosis. No calcified stones    PELVIS FINDINGS:  No small bowel distention to suggest obstruction. No colon dilation. No  significant diverticular disease. Appendix is nonenlarged.    Small amount of fat protrudes at the level of the umbilicus as on the previous  exam. Defect measures approximately 1.9 x 1.7 cm. New from that

## 2024-12-06 ENCOUNTER — OFFICE VISIT (OUTPATIENT)
Dept: PRIMARY CARE | Facility: EXTERNAL LOCATION | Age: 34
End: 2024-12-06

## 2024-12-06 VITALS
OXYGEN SATURATION: 100 % | WEIGHT: 177 LBS | HEART RATE: 97 BPM | SYSTOLIC BLOOD PRESSURE: 131 MMHG | BODY MASS INDEX: 32.37 KG/M2 | TEMPERATURE: 97.4 F | DIASTOLIC BLOOD PRESSURE: 89 MMHG

## 2024-12-06 DIAGNOSIS — N76.0 ACUTE VAGINITIS: Primary | ICD-10-CM

## 2024-12-06 DIAGNOSIS — M54.50 ACUTE LOW BACK PAIN WITHOUT SCIATICA, UNSPECIFIED BACK PAIN LATERALITY: ICD-10-CM

## 2024-12-06 PROBLEM — I10 ESSENTIAL (PRIMARY) HYPERTENSION: Status: ACTIVE | Noted: 2023-12-05

## 2024-12-06 LAB
POC APPEARANCE, URINE: ABNORMAL
POC BILIRUBIN, URINE: NEGATIVE
POC BLOOD, URINE: ABNORMAL
POC COLOR, URINE: ABNORMAL
POC GLUCOSE, URINE: NEGATIVE MG/DL
POC KETONES, URINE: NEGATIVE MG/DL
POC LEUKOCYTES, URINE: NEGATIVE
POC NITRITE,URINE: NEGATIVE
POC PH, URINE: 7 PH
POC PROTEIN, URINE: NEGATIVE MG/DL
POC SPECIFIC GRAVITY, URINE: 1.02
POC UROBILINOGEN, URINE: 0.2 EU/DL

## 2024-12-06 PROCEDURE — 87086 URINE CULTURE/COLONY COUNT: CPT | Performed by: NURSE PRACTITIONER

## 2024-12-06 PROCEDURE — 87205 SMEAR GRAM STAIN: CPT | Performed by: NURSE PRACTITIONER

## 2024-12-06 RX ORDER — ASPIRIN 325 MG
1 TABLET, DELAYED RELEASE (ENTERIC COATED) ORAL EVERY EVENING
Qty: 45 G | Refills: 0 | Status: SHIPPED | OUTPATIENT
Start: 2024-12-06 | End: 2024-12-13

## 2024-12-06 RX ORDER — FLUCONAZOLE 150 MG/1
150 TABLET ORAL ONCE
Qty: 1 TABLET | Refills: 0 | Status: SHIPPED | OUTPATIENT
Start: 2024-12-06 | End: 2024-12-06

## 2024-12-06 ASSESSMENT — ENCOUNTER SYMPTOMS
ABDOMINAL PAIN: 0
FREQUENCY: 0
VOMITING: 0
DYSURIA: 0
CHILLS: 0
FLANK PAIN: 0
BACK PAIN: 1
FEVER: 0
NAUSEA: 0

## 2024-12-06 NOTE — PROGRESS NOTES
Subjective   Patient ID: Abdulaziz Kennedy is a 34 y.o. female who presents for UTI (Irritation/Lower back pain) and Vaginitis/Bacterial Vaginosis (Some irritation/Some discharge/itchy).    HPI:  Wants to check for UTI. Some vaginal irritation when wipes. No burning . No urinary frequency. No urinary urgency.    Used to get  yeast infections a lot. Took steroids last Friday. Some discharge but nothing unusual.     Denies concern for STD's.   Denies abdominal pain, nausea, vomiting, fever or chills.   LMP: does not get. Has implant.   Patient reports is up to date on Pap.     No Known Allergies    Current Outpatient Medications   Medication Sig Dispense Refill    amitriptyline (Elavil) 10 mg tablet Take 2 tablets by mouth at night      etonogestrel-eluting contraceptive 68 mg implant implant Inject 1 each under the skin.      losartan (Cozaar) 50 mg tablet Take 1 tablet (50 mg) by mouth once daily.      spironolactone (Aldactone) 100 mg tablet Take 1 tablet (100 mg) by mouth twice a day.      SUMAtriptan (Imitrex) 100 mg tablet Take 1 tablet by mouth at onset of a moderate to severe migraine, if not completely gone in 2 hours you may repeat the dose x 1. Maximum of 2 doses in 24 hours, Maximum use of 2 days each week.      clotrimazole (Lotrimin) 1 % vaginal cream Insert 1 applicator into the vagina once daily in the evening for 7 days. 45 g 0    fluconazole (Diflucan) 150 mg tablet Take 1 tablet (150 mg) by mouth 1 time for 1 dose. 1 tablet 0     No current facility-administered medications for this visit.        Past Medical History:   Diagnosis Date    Anxiety     Chlamydial infection, unspecified 06/11/2014    Chlamydia    Depression     GERD (gastroesophageal reflux disease) 10/04/2024    HGSIL (high grade squamous intraepithelial lesion) on Pap smear of cervix 10/04/2024    Hidradenitis suppurativa 10/04/2024    Hypertension     Personal history of diseases of the skin and subcutaneous tissue 12/14/2015     History of keloid of skin    Twin pregnancy, dichorionic/diamniotic, unspecified trimester [facility] 2016    Dichorionic diamniotic twin pregnancy       Past Surgical History:   Procedure Laterality Date     SECTION, LOW TRANSVERSE      OTHER SURGICAL HISTORY  2014    Append Laparoscopy Appendix Removed Directly Through A Port    OTHER SURGICAL HISTORY  2015    Excision For Hidradenitis Axillary       Review of Systems   Constitutional:  Negative for chills and fever.   Gastrointestinal:  Negative for abdominal pain, nausea and vomiting.   Genitourinary:  Negative for dysuria, flank pain, frequency, urgency and vaginal discharge.        Complains of vaginal irritation   Musculoskeletal:  Positive for back pain.       Objective   Visit Vitals  /89   Pulse 97   Temp 36.3 °C (97.4 °F) (Oral)   Wt 80.3 kg (177 lb)   SpO2 100%   BMI 32.37 kg/m²   OB Status Implant   Smoking Status Never   BSA 1.87 m²       Office Visit on 2024   Component Date Value Ref Range Status    POC Color, Urine 2024 Red-brown (A)  Straw, Yellow, Light-Yellow Final    POC Appearance, Urine 2024 Cloudy (A)  Clear Final    POC Glucose, Urine 2024 NEGATIVE  NEGATIVE mg/dl Final    POC Bilirubin, Urine 2024 NEGATIVE  NEGATIVE Final    POC Ketones, Urine 2024 NEGATIVE  NEGATIVE mg/dl Final    POC Specific Gravity, Urine 2024 1.025  1.005 - 1.035 Final    POC Blood, Urine 2024 TRACE-Intact (A)  NEGATIVE Final    POC PH, Urine 2024 7.0  No Reference Range Established PH Final    POC Protein, Urine 2024 NEGATIVE  NEGATIVE, 30 (1+) mg/dl Final    POC Urobilinogen, Urine 2024 0.2  0.2, 1.0 EU/DL Final    Poc Nitrite, Urine 2024 NEGATIVE  NEGATIVE Final    POC Leukocytes, Urine 2024 NEGATIVE  NEGATIVE Final       Patient declined chaperone for vaginal exam.     Physical Exam  Constitutional:       General: She is not in acute distress.      Appearance: Normal appearance. She is not ill-appearing or toxic-appearing.   Pulmonary:      Effort: Pulmonary effort is normal.      Breath sounds: Normal breath sounds.   Abdominal:      General: Abdomen is flat. Bowel sounds are normal. There is no distension.      Palpations: Abdomen is soft.      Tenderness: There is no abdominal tenderness. There is no right CVA tenderness, left CVA tenderness, guarding or rebound.   Genitourinary:     Comments: Vaginal exam: external genitalia normal, no lesions, thick white discharge noted (cultured), no CMT.   Neurological:      General: No focal deficit present.      Mental Status: She is alert.   Psychiatric:         Mood and Affect: Mood normal.         Behavior: Behavior normal.         Thought Content: Thought content normal.         Assessment/Plan   Diagnoses and all orders for this visit:  Acute vaginitis  -     Vaginitis Gram Stain For Bacterial Vaginosis + Yeast  -     fluconazole (Diflucan) 150 mg tablet; Take 1 tablet (150 mg) by mouth 1 time for 1 dose.  -     clotrimazole (Lotrimin) 1 % vaginal cream; Insert 1 applicator into the vagina once daily in the evening for 7 days.  Acute low back pain without sciatica, unspecified back pain laterality  -     POCT UA (nonautomated) manually resulted  -     Urine Culture      - Will treat for Candida. Prescriptions sent to pharmacy and possible side effects discussed.   - Will send urine culture and treat if indicated.   - Culture sent for BV and yeast.   - Follow up in 3 days if no improvement. Sooner with concerns or worsening symptoms.

## 2024-12-06 NOTE — PATIENT INSTRUCTIONS
- Avoid scented tampons or vaginal soaps.   - Do not wear clothes that may hold moisture, such as nylon or polyester.  - Wear loose-fitting pants. Avoid tight clothing.    FOLLOW UP WITH YOUR PRIMARY CARE OR GO TO  IF:  You have sudden severe belly pain, or the belly pain is constant. Your belly becomes very hard or swollen.  You have worse vaginal pain even though you are taking your medicine.  You are bleeding from your vagina and you are not having your period.  You have a fever of 100.4°F (38°C) or higher.  Your vaginal discharge becomes worse.  Your vaginal discharge does not return to normal after you finish taking your medicine.  You have new or worsening symptoms.  You get a high fever or shaking chills, even though you have taken your medicine.  You have a fever of 100.4°F (38°C) or higher or chills.  You have any of these symptoms after you finish treatment:  Burning or pain when you pass urine  Discharge or foul smell from your genitals  Pain during sex  Vaginal or rectal itching  Rectal discharge or bleeding  Blisters, warts, rashes, or sores on your mouth, lips, or genitals  You have pain with passing urine or have blood in your urine.  With any worsening symptoms/concerns.

## 2024-12-07 LAB
CLUE CELLS VAG LPF-#/AREA: NORMAL /[LPF]
NUGENT SCORE: 0
YEAST VAG WET PREP-#/AREA: NORMAL

## 2024-12-08 LAB — BACTERIA UR CULT: NORMAL

## 2025-01-10 ENCOUNTER — APPOINTMENT (OUTPATIENT)
Facility: HOSPITAL | Age: 35
DRG: 427 | End: 2025-01-10
Payer: MEDICAID

## 2025-01-10 ENCOUNTER — HOSPITAL ENCOUNTER (INPATIENT)
Facility: HOSPITAL | Age: 35
LOS: 3 days | Discharge: HOME | DRG: 427 | End: 2025-01-13
Attending: EMERGENCY MEDICINE | Admitting: STUDENT IN AN ORGANIZED HEALTH CARE EDUCATION/TRAINING PROGRAM
Payer: MEDICAID

## 2025-01-10 DIAGNOSIS — R53.1 GENERAL WEAKNESS: Primary | ICD-10-CM

## 2025-01-10 DIAGNOSIS — R10.84 GENERALIZED ABDOMINAL PAIN: ICD-10-CM

## 2025-01-10 DIAGNOSIS — E03.9 HYPOTHYROIDISM, UNSPECIFIED TYPE: ICD-10-CM

## 2025-01-10 DIAGNOSIS — Z91.148 NONCOMPLIANCE WITH MEDICATION REGIMEN: ICD-10-CM

## 2025-01-10 LAB
ALBUMIN SERPL-MCNC: 3.9 G/DL (ref 3.4–5)
ALBUMIN/GLOB SERPL: 1 (ref 0.8–1.7)
ALP SERPL-CCNC: 78 U/L (ref 45–117)
ALT SERPL-CCNC: 12 U/L (ref 13–56)
AMPHET UR QL SCN: POSITIVE
ANION GAP SERPL CALC-SCNC: 5 MMOL/L (ref 3–18)
APPEARANCE UR: ABNORMAL
AST SERPL-CCNC: 10 U/L (ref 10–38)
B PERT DNA SPEC QL NAA+PROBE: NOT DETECTED
BACTERIA URNS QL MICRO: ABNORMAL /HPF
BARBITURATES UR QL SCN: NEGATIVE
BASOPHILS # BLD: 0.04 K/UL (ref 0–0.1)
BASOPHILS NFR BLD: 0.3 % (ref 0–2)
BENZODIAZ UR QL: NEGATIVE
BILIRUB DIRECT SERPL-MCNC: 0.2 MG/DL (ref 0–0.2)
BILIRUB SERPL-MCNC: 0.6 MG/DL (ref 0.2–1)
BILIRUB UR QL: NEGATIVE
BORDETELLA PARAPERTUSSIS BY PCR: NOT DETECTED
BUN SERPL-MCNC: 14 MG/DL (ref 7–18)
BUN/CREAT SERPL: 15 (ref 12–20)
C PNEUM DNA SPEC QL NAA+PROBE: NOT DETECTED
CALCIUM SERPL-MCNC: 9.2 MG/DL (ref 8.5–10.1)
CANNABINOIDS UR QL SCN: NEGATIVE
CHLORIDE SERPL-SCNC: 105 MMOL/L (ref 100–111)
CO2 SERPL-SCNC: 31 MMOL/L (ref 21–32)
COCAINE UR QL SCN: NEGATIVE
COLOR UR: YELLOW
CREAT SERPL-MCNC: 0.95 MG/DL (ref 0.6–1.3)
DIFFERENTIAL METHOD BLD: ABNORMAL
EKG ATRIAL RATE: 74 BPM
EKG DIAGNOSIS: NORMAL
EKG P AXIS: 83 DEGREES
EKG P-R INTERVAL: 210 MS
EKG Q-T INTERVAL: 482 MS
EKG QRS DURATION: 84 MS
EKG QTC CALCULATION (BAZETT): 535 MS
EKG R AXIS: 112 DEGREES
EKG T AXIS: 86 DEGREES
EKG VENTRICULAR RATE: 74 BPM
EOSINOPHIL # BLD: 0.09 K/UL (ref 0–0.4)
EOSINOPHIL NFR BLD: 0.7 % (ref 0–5)
EPITH CASTS URNS QL MICRO: ABNORMAL /LPF (ref 0–5)
ERYTHROCYTE [DISTWIDTH] IN BLOOD BY AUTOMATED COUNT: 17.2 % (ref 11.6–14.5)
FLUAV SUBTYP SPEC NAA+PROBE: NOT DETECTED
FLUBV RNA SPEC QL NAA+PROBE: NOT DETECTED
GLOBULIN SER CALC-MCNC: 3.9 G/DL (ref 2–4)
GLUCOSE SERPL-MCNC: 96 MG/DL (ref 74–99)
GLUCOSE UR STRIP.AUTO-MCNC: NEGATIVE MG/DL
HADV DNA SPEC QL NAA+PROBE: NOT DETECTED
HCG SERPL-ACNC: <1 MIU/ML (ref 0–10)
HCG UR QL: NEGATIVE
HCOV 229E RNA SPEC QL NAA+PROBE: NOT DETECTED
HCOV HKU1 RNA SPEC QL NAA+PROBE: NOT DETECTED
HCOV NL63 RNA SPEC QL NAA+PROBE: NOT DETECTED
HCOV OC43 RNA SPEC QL NAA+PROBE: NOT DETECTED
HCT VFR BLD AUTO: 38.4 % (ref 35–45)
HGB BLD-MCNC: 11.8 G/DL (ref 12–16)
HGB UR QL STRIP: ABNORMAL
HMPV RNA SPEC QL NAA+PROBE: NOT DETECTED
HPIV1 RNA SPEC QL NAA+PROBE: NOT DETECTED
HPIV2 RNA SPEC QL NAA+PROBE: NOT DETECTED
HPIV3 RNA SPEC QL NAA+PROBE: NOT DETECTED
HPIV4 RNA SPEC QL NAA+PROBE: NOT DETECTED
IMM GRANULOCYTES # BLD AUTO: 0.06 K/UL (ref 0–0.04)
IMM GRANULOCYTES NFR BLD AUTO: 0.5 % (ref 0–0.5)
KETONES UR QL STRIP.AUTO: NEGATIVE MG/DL
LEUKOCYTE ESTERASE UR QL STRIP.AUTO: NEGATIVE
LIPASE SERPL-CCNC: 15 U/L (ref 13–75)
LYMPHOCYTES # BLD: 1.06 K/UL (ref 0.9–3.6)
LYMPHOCYTES NFR BLD: 8.1 % (ref 21–52)
Lab: ABNORMAL
M PNEUMO DNA SPEC QL NAA+PROBE: NOT DETECTED
MCH RBC QN AUTO: 27.5 PG (ref 24–34)
MCHC RBC AUTO-ENTMCNC: 30.7 G/DL (ref 31–37)
MCV RBC AUTO: 89.5 FL (ref 78–100)
METHADONE UR QL: NEGATIVE
MONOCYTES # BLD: 0.59 K/UL (ref 0.05–1.2)
MONOCYTES NFR BLD: 4.5 % (ref 3–10)
NEUTS SEG # BLD: 11.17 K/UL (ref 1.8–8)
NEUTS SEG NFR BLD: 85.9 % (ref 40–73)
NITRITE UR QL STRIP.AUTO: NEGATIVE
NRBC # BLD: 0 K/UL (ref 0–0.01)
NRBC BLD-RTO: 0 PER 100 WBC
NT PRO BNP: 456 PG/ML (ref 0–450)
OPIATES UR QL: POSITIVE
PCP UR QL: NEGATIVE
PH UR STRIP: 7.5 (ref 5–8)
PLATELET # BLD AUTO: 301 K/UL (ref 135–420)
PMV BLD AUTO: 12.2 FL (ref 9.2–11.8)
POTASSIUM SERPL-SCNC: 3.2 MMOL/L (ref 3.5–5.5)
PROT SERPL-MCNC: 7.8 G/DL (ref 6.4–8.2)
PROT UR STRIP-MCNC: 100 MG/DL
RBC # BLD AUTO: 4.29 M/UL (ref 4.2–5.3)
RBC #/AREA URNS HPF: ABNORMAL /HPF (ref 0–5)
RSV RNA SPEC QL NAA+PROBE: NOT DETECTED
RV+EV RNA SPEC QL NAA+PROBE: NOT DETECTED
SARS-COV-2 RNA RESP QL NAA+PROBE: NOT DETECTED
SODIUM SERPL-SCNC: 141 MMOL/L (ref 136–145)
SP GR UR REFRACTOMETRY: >1.03 (ref 1–1.04)
T4 FREE SERPL-MCNC: 0.4 NG/DL (ref 0.7–1.5)
TROPONIN I SERPL HS-MCNC: 11 NG/L (ref 0–54)
TSH SERPL DL<=0.05 MIU/L-ACNC: 66.3 UIU/ML (ref 0.36–3.74)
UROBILINOGEN UR QL STRIP.AUTO: 0.2 EU/DL (ref 0.2–1)
WBC # BLD AUTO: 13 K/UL (ref 4.6–13.2)
WBC URNS QL MICRO: ABNORMAL /HPF (ref 0–5)

## 2025-01-10 PROCEDURE — 81025 URINE PREGNANCY TEST: CPT

## 2025-01-10 PROCEDURE — 93010 ELECTROCARDIOGRAM REPORT: CPT | Performed by: INTERNAL MEDICINE

## 2025-01-10 PROCEDURE — 2500000003 HC RX 250 WO HCPCS: Performed by: STUDENT IN AN ORGANIZED HEALTH CARE EDUCATION/TRAINING PROGRAM

## 2025-01-10 PROCEDURE — 80307 DRUG TEST PRSMV CHEM ANLYZR: CPT

## 2025-01-10 PROCEDURE — 6370000000 HC RX 637 (ALT 250 FOR IP): Performed by: PHYSICIAN ASSISTANT

## 2025-01-10 PROCEDURE — 85025 COMPLETE CBC W/AUTO DIFF WBC: CPT

## 2025-01-10 PROCEDURE — 6360000002 HC RX W HCPCS: Performed by: STUDENT IN AN ORGANIZED HEALTH CARE EDUCATION/TRAINING PROGRAM

## 2025-01-10 PROCEDURE — 1100000000 HC RM PRIVATE

## 2025-01-10 PROCEDURE — 96374 THER/PROPH/DIAG INJ IV PUSH: CPT

## 2025-01-10 PROCEDURE — 80053 COMPREHEN METABOLIC PANEL: CPT

## 2025-01-10 PROCEDURE — 83690 ASSAY OF LIPASE: CPT

## 2025-01-10 PROCEDURE — 71045 X-RAY EXAM CHEST 1 VIEW: CPT

## 2025-01-10 PROCEDURE — 84439 ASSAY OF FREE THYROXINE: CPT

## 2025-01-10 PROCEDURE — 96375 TX/PRO/DX INJ NEW DRUG ADDON: CPT

## 2025-01-10 PROCEDURE — 84443 ASSAY THYROID STIM HORMONE: CPT

## 2025-01-10 PROCEDURE — 83880 ASSAY OF NATRIURETIC PEPTIDE: CPT

## 2025-01-10 PROCEDURE — 84484 ASSAY OF TROPONIN QUANT: CPT

## 2025-01-10 PROCEDURE — 74177 CT ABD & PELVIS W/CONTRAST: CPT

## 2025-01-10 PROCEDURE — 6360000002 HC RX W HCPCS: Performed by: PHYSICIAN ASSISTANT

## 2025-01-10 PROCEDURE — 84702 CHORIONIC GONADOTROPIN TEST: CPT

## 2025-01-10 PROCEDURE — 99285 EMERGENCY DEPT VISIT HI MDM: CPT

## 2025-01-10 PROCEDURE — 93005 ELECTROCARDIOGRAM TRACING: CPT | Performed by: PHYSICIAN ASSISTANT

## 2025-01-10 PROCEDURE — 0202U NFCT DS 22 TRGT SARS-COV-2: CPT

## 2025-01-10 PROCEDURE — 81001 URINALYSIS AUTO W/SCOPE: CPT

## 2025-01-10 PROCEDURE — 80076 HEPATIC FUNCTION PANEL: CPT

## 2025-01-10 PROCEDURE — 6360000004 HC RX CONTRAST MEDICATION: Performed by: EMERGENCY MEDICINE

## 2025-01-10 RX ORDER — ONDANSETRON 4 MG/1
4 TABLET, ORALLY DISINTEGRATING ORAL EVERY 8 HOURS PRN
Status: DISCONTINUED | OUTPATIENT
Start: 2025-01-10 | End: 2025-01-13 | Stop reason: HOSPADM

## 2025-01-10 RX ORDER — SODIUM CHLORIDE 0.9 % (FLUSH) 0.9 %
5-40 SYRINGE (ML) INJECTION PRN
Status: DISCONTINUED | OUTPATIENT
Start: 2025-01-10 | End: 2025-01-13 | Stop reason: HOSPADM

## 2025-01-10 RX ORDER — HYDROCORTISONE SODIUM SUCCINATE 100 MG/2ML
100 INJECTION INTRAMUSCULAR; INTRAVENOUS ONCE
Status: COMPLETED | OUTPATIENT
Start: 2025-01-10 | End: 2025-01-10

## 2025-01-10 RX ORDER — ACETAMINOPHEN 500 MG
1000 TABLET ORAL
Status: COMPLETED | OUTPATIENT
Start: 2025-01-10 | End: 2025-01-10

## 2025-01-10 RX ORDER — ACETAMINOPHEN 325 MG/1
650 TABLET ORAL EVERY 6 HOURS PRN
Status: DISCONTINUED | OUTPATIENT
Start: 2025-01-10 | End: 2025-01-13 | Stop reason: HOSPADM

## 2025-01-10 RX ORDER — LIOTHYRONINE SODIUM 10 UG/ML
10 INJECTION, SOLUTION INTRAVENOUS ONCE
Status: COMPLETED | OUTPATIENT
Start: 2025-01-11 | End: 2025-01-11

## 2025-01-10 RX ORDER — MORPHINE SULFATE 2 MG/ML
2 INJECTION, SOLUTION INTRAMUSCULAR; INTRAVENOUS EVERY 4 HOURS PRN
Status: DISCONTINUED | OUTPATIENT
Start: 2025-01-10 | End: 2025-01-13 | Stop reason: HOSPADM

## 2025-01-10 RX ORDER — ENOXAPARIN SODIUM 100 MG/ML
30 INJECTION SUBCUTANEOUS 2 TIMES DAILY
Status: DISCONTINUED | OUTPATIENT
Start: 2025-01-10 | End: 2025-01-13 | Stop reason: HOSPADM

## 2025-01-10 RX ORDER — SODIUM CHLORIDE 0.9 % (FLUSH) 0.9 %
5-40 SYRINGE (ML) INJECTION EVERY 12 HOURS SCHEDULED
Status: DISCONTINUED | OUTPATIENT
Start: 2025-01-10 | End: 2025-01-13 | Stop reason: HOSPADM

## 2025-01-10 RX ORDER — SODIUM CHLORIDE 9 MG/ML
INJECTION, SOLUTION INTRAVENOUS PRN
Status: DISCONTINUED | OUTPATIENT
Start: 2025-01-10 | End: 2025-01-13 | Stop reason: HOSPADM

## 2025-01-10 RX ORDER — LIOTHYRONINE SODIUM 10 UG/ML
10 INJECTION, SOLUTION INTRAVENOUS ONCE
Status: COMPLETED | OUTPATIENT
Start: 2025-01-10 | End: 2025-01-10

## 2025-01-10 RX ORDER — POLYETHYLENE GLYCOL 3350 17 G/17G
17 POWDER, FOR SOLUTION ORAL DAILY PRN
Status: DISCONTINUED | OUTPATIENT
Start: 2025-01-10 | End: 2025-01-13 | Stop reason: HOSPADM

## 2025-01-10 RX ORDER — ACETAMINOPHEN 650 MG/1
650 SUPPOSITORY RECTAL EVERY 6 HOURS PRN
Status: DISCONTINUED | OUTPATIENT
Start: 2025-01-10 | End: 2025-01-13 | Stop reason: HOSPADM

## 2025-01-10 RX ORDER — ONDANSETRON 2 MG/ML
4 INJECTION INTRAMUSCULAR; INTRAVENOUS
Status: COMPLETED | OUTPATIENT
Start: 2025-01-10 | End: 2025-01-10

## 2025-01-10 RX ORDER — POTASSIUM CHLORIDE 1500 MG/1
40 TABLET, EXTENDED RELEASE ORAL
Status: COMPLETED | OUTPATIENT
Start: 2025-01-10 | End: 2025-01-10

## 2025-01-10 RX ORDER — IOPAMIDOL 612 MG/ML
100 INJECTION, SOLUTION INTRAVASCULAR
Status: COMPLETED | OUTPATIENT
Start: 2025-01-10 | End: 2025-01-10

## 2025-01-10 RX ORDER — ONDANSETRON 2 MG/ML
4 INJECTION INTRAMUSCULAR; INTRAVENOUS EVERY 6 HOURS PRN
Status: DISCONTINUED | OUTPATIENT
Start: 2025-01-10 | End: 2025-01-13 | Stop reason: HOSPADM

## 2025-01-10 RX ORDER — KETOROLAC TROMETHAMINE 15 MG/ML
15 INJECTION, SOLUTION INTRAMUSCULAR; INTRAVENOUS
Status: COMPLETED | OUTPATIENT
Start: 2025-01-10 | End: 2025-01-10

## 2025-01-10 RX ORDER — POTASSIUM CHLORIDE 7.45 MG/ML
10 INJECTION INTRAVENOUS PRN
Status: DISCONTINUED | OUTPATIENT
Start: 2025-01-10 | End: 2025-01-13 | Stop reason: HOSPADM

## 2025-01-10 RX ORDER — FUROSEMIDE 10 MG/ML
40 INJECTION INTRAMUSCULAR; INTRAVENOUS ONCE
Status: COMPLETED | OUTPATIENT
Start: 2025-01-10 | End: 2025-01-11

## 2025-01-10 RX ORDER — LEVOTHYROXINE SODIUM 100 UG/1
200 TABLET ORAL ONCE
Status: COMPLETED | OUTPATIENT
Start: 2025-01-10 | End: 2025-01-10

## 2025-01-10 RX ORDER — POTASSIUM CHLORIDE 1500 MG/1
40 TABLET, EXTENDED RELEASE ORAL PRN
Status: DISCONTINUED | OUTPATIENT
Start: 2025-01-10 | End: 2025-01-13 | Stop reason: HOSPADM

## 2025-01-10 RX ORDER — MORPHINE SULFATE 4 MG/ML
4 INJECTION, SOLUTION INTRAMUSCULAR; INTRAVENOUS
Status: COMPLETED | OUTPATIENT
Start: 2025-01-10 | End: 2025-01-10

## 2025-01-10 RX ORDER — MAGNESIUM SULFATE IN WATER 40 MG/ML
2000 INJECTION, SOLUTION INTRAVENOUS PRN
Status: DISCONTINUED | OUTPATIENT
Start: 2025-01-10 | End: 2025-01-13 | Stop reason: HOSPADM

## 2025-01-10 RX ADMIN — MORPHINE SULFATE 4 MG: 4 INJECTION, SOLUTION INTRAMUSCULAR; INTRAVENOUS at 13:05

## 2025-01-10 RX ADMIN — IOPAMIDOL 100 ML: 612 INJECTION, SOLUTION INTRAVENOUS at 14:35

## 2025-01-10 RX ADMIN — POTASSIUM CHLORIDE 40 MEQ: 1500 TABLET, EXTENDED RELEASE ORAL at 22:07

## 2025-01-10 RX ADMIN — KETOROLAC TROMETHAMINE 15 MG: 15 INJECTION, SOLUTION INTRAMUSCULAR; INTRAVENOUS at 19:08

## 2025-01-10 RX ADMIN — HYDROCORTISONE SODIUM SUCCINATE 100 MG: 100 INJECTION, POWDER, FOR SOLUTION INTRAMUSCULAR; INTRAVENOUS at 22:42

## 2025-01-10 RX ADMIN — LIOTHYRONINE SODIUM 10 MCG: 10 INJECTION, SOLUTION INTRAVENOUS at 22:45

## 2025-01-10 RX ADMIN — ACETAMINOPHEN 1000 MG: 500 TABLET ORAL at 19:08

## 2025-01-10 RX ADMIN — LEVOTHYROXINE SODIUM 200 MCG: 100 TABLET ORAL at 15:44

## 2025-01-10 RX ADMIN — ONDANSETRON 4 MG: 2 INJECTION INTRAMUSCULAR; INTRAVENOUS at 13:04

## 2025-01-10 ASSESSMENT — PAIN - FUNCTIONAL ASSESSMENT
PAIN_FUNCTIONAL_ASSESSMENT: ACTIVITIES ARE NOT PREVENTED
PAIN_FUNCTIONAL_ASSESSMENT: ACTIVITIES ARE NOT PREVENTED
PAIN_FUNCTIONAL_ASSESSMENT: 0-10

## 2025-01-10 ASSESSMENT — PAIN SCALES - GENERAL
PAINLEVEL_OUTOF10: 5
PAINLEVEL_OUTOF10: 7
PAINLEVEL_OUTOF10: 10
PAINLEVEL_OUTOF10: 7

## 2025-01-10 ASSESSMENT — PAIN DESCRIPTION - ORIENTATION: ORIENTATION: RIGHT;UPPER

## 2025-01-10 ASSESSMENT — PAIN DESCRIPTION - LOCATION
LOCATION: ABDOMEN

## 2025-01-10 ASSESSMENT — PAIN DESCRIPTION - PAIN TYPE: TYPE: ACUTE PAIN

## 2025-01-10 ASSESSMENT — PAIN DESCRIPTION - DESCRIPTORS
DESCRIPTORS: ACHING
DESCRIPTORS: STABBING

## 2025-01-10 NOTE — ED NOTES
Incontinence and perineal care provided.  New brief, chux, and purwick provided.  Pt resting comfortably in bed, no acute distress noted or verbalized.  Call bell within reach, safety measures in place.

## 2025-01-10 NOTE — ED PROVIDER NOTES
EMERGENCY DEPARTMENT HISTORY AND PHYSICAL EXAM      Date: 1/10/2025  Patient Name: Aditya Gusman    History of Presenting Illness     Chief Complaint   Patient presents with    Abdominal Pain     History (Context): Aditya Gusman is a 34 y.o. female with past medical history significant for CHF, hypothyroid, hypertension, depression, anxiety, morbid obesity who presents ED with complaints of persistent periumbilical, right upper quadrant and flank pain for the past 3 hours.  Does report associated nausea without vomiting.  Recently was evaluated at urgent care for 5 days of diarrhea.  Denies any antibiotic use recent admission to the hospital in the past few weeks.  Denies any recent travel.  Denies any fevers, chills, or sick contacts.  Currently denies any chest pain but does report being short of breath at baseline.  Denies any dysuria or hematuria.     PCP: Clayton King MD    Current Facility-Administered Medications   Medication Dose Route Frequency Provider Last Rate Last Admin    potassium chloride (KLOR-CON M) extended release tablet 40 mEq  40 mEq Oral NOW Bruce Mcqueen,         hydrocortisone sodium succinate PF (SOLU-CORTEF) injection 100 mg  100 mg IntraVENous Once Gianfranco Solitario MD        morphine (PF) injection 2 mg  2 mg IntraVENous Q4H PRN Gianfranco Solitario MD         Current Outpatient Medications   Medication Sig Dispense Refill    levothyroxine (SYNTHROID) 200 MCG tablet Take 1 tablet by mouth Daily 30 tablet 3    escitalopram (LEXAPRO) 10 MG tablet Take 1 tablet by mouth daily 30 tablet 3    furosemide (LASIX) 20 MG tablet Take 1 tablet by mouth in the morning and 1 tablet in the evening. 60 tablet 3    losartan (COZAAR) 50 MG tablet Take 1 tablet by mouth daily 30 tablet 3    carvedilol (COREG) 6.25 MG tablet Take 1 tablet by mouth 2 times daily (with meals) 60 tablet 3    ferrous sulfate (IRON 325) 325 (65 Fe) MG tablet Take 1 tablet by mouth daily (with breakfast)

## 2025-01-11 ENCOUNTER — APPOINTMENT (OUTPATIENT)
Facility: HOSPITAL | Age: 35
DRG: 427 | End: 2025-01-11
Payer: MEDICAID

## 2025-01-11 LAB
ALBUMIN SERPL-MCNC: 3.2 G/DL (ref 3.4–5)
ALBUMIN/GLOB SERPL: 0.9 (ref 0.8–1.7)
ALP SERPL-CCNC: 67 U/L (ref 45–117)
ALT SERPL-CCNC: 12 U/L (ref 13–56)
ANION GAP SERPL CALC-SCNC: 3 MMOL/L (ref 3–18)
AST SERPL-CCNC: 7 U/L (ref 10–38)
BILIRUB SERPL-MCNC: 0.9 MG/DL (ref 0.2–1)
BUN SERPL-MCNC: 12 MG/DL (ref 7–18)
BUN/CREAT SERPL: 16 (ref 12–20)
CALCIUM SERPL-MCNC: 8.3 MG/DL (ref 8.5–10.1)
CHLORIDE SERPL-SCNC: 102 MMOL/L (ref 100–111)
CO2 SERPL-SCNC: 32 MMOL/L (ref 21–32)
CREAT SERPL-MCNC: 0.75 MG/DL (ref 0.6–1.3)
GLOBULIN SER CALC-MCNC: 3.5 G/DL (ref 2–4)
GLUCOSE SERPL-MCNC: 137 MG/DL (ref 74–99)
POTASSIUM SERPL-SCNC: 3.3 MMOL/L (ref 3.5–5.5)
PROT SERPL-MCNC: 6.7 G/DL (ref 6.4–8.2)
SODIUM SERPL-SCNC: 137 MMOL/L (ref 136–145)

## 2025-01-11 PROCEDURE — 78227 HEPATOBIL SYST IMAGE W/DRUG: CPT

## 2025-01-11 PROCEDURE — 6370000000 HC RX 637 (ALT 250 FOR IP): Performed by: STUDENT IN AN ORGANIZED HEALTH CARE EDUCATION/TRAINING PROGRAM

## 2025-01-11 PROCEDURE — 94660 CPAP INITIATION&MGMT: CPT

## 2025-01-11 PROCEDURE — 94761 N-INVAS EAR/PLS OXIMETRY MLT: CPT

## 2025-01-11 PROCEDURE — 6360000002 HC RX W HCPCS: Performed by: STUDENT IN AN ORGANIZED HEALTH CARE EDUCATION/TRAINING PROGRAM

## 2025-01-11 PROCEDURE — 2700000000 HC OXYGEN THERAPY PER DAY

## 2025-01-11 PROCEDURE — 3430000000 HC RX DIAGNOSTIC RADIOPHARMACEUTICAL: Performed by: STUDENT IN AN ORGANIZED HEALTH CARE EDUCATION/TRAINING PROGRAM

## 2025-01-11 PROCEDURE — A9537 TC99M MEBROFENIN: HCPCS | Performed by: STUDENT IN AN ORGANIZED HEALTH CARE EDUCATION/TRAINING PROGRAM

## 2025-01-11 PROCEDURE — 2500000003 HC RX 250 WO HCPCS: Performed by: STUDENT IN AN ORGANIZED HEALTH CARE EDUCATION/TRAINING PROGRAM

## 2025-01-11 PROCEDURE — 1100000000 HC RM PRIVATE

## 2025-01-11 PROCEDURE — 6360000004 HC RX CONTRAST MEDICATION: Performed by: STUDENT IN AN ORGANIZED HEALTH CARE EDUCATION/TRAINING PROGRAM

## 2025-01-11 PROCEDURE — 99232 SBSQ HOSP IP/OBS MODERATE 35: CPT | Performed by: HOSPITALIST

## 2025-01-11 RX ORDER — LIOTHYRONINE SODIUM 5 UG/1
5 TABLET ORAL DAILY
Status: DISCONTINUED | OUTPATIENT
Start: 2025-01-12 | End: 2025-01-13 | Stop reason: HOSPADM

## 2025-01-11 RX ORDER — KIT FOR THE PREPARATION OF TECHNETIUM TC 99M MEBROFENIN 45 MG/10ML
6.4 INJECTION, POWDER, LYOPHILIZED, FOR SOLUTION INTRAVENOUS
Status: COMPLETED | OUTPATIENT
Start: 2025-01-11 | End: 2025-01-11

## 2025-01-11 RX ORDER — LEVOTHYROXINE SODIUM 100 UG/1
200 TABLET ORAL DAILY
Status: DISCONTINUED | OUTPATIENT
Start: 2025-01-12 | End: 2025-01-13 | Stop reason: HOSPADM

## 2025-01-11 RX ORDER — FUROSEMIDE 20 MG/1
20 TABLET ORAL 2 TIMES DAILY
Status: DISCONTINUED | OUTPATIENT
Start: 2025-01-12 | End: 2025-01-13 | Stop reason: HOSPADM

## 2025-01-11 RX ADMIN — WATER 2.74 MCG: 1 INJECTION INTRAMUSCULAR; INTRAVENOUS; SUBCUTANEOUS at 04:50

## 2025-01-11 RX ADMIN — SODIUM CHLORIDE, PRESERVATIVE FREE 10 ML: 5 INJECTION INTRAVENOUS at 01:58

## 2025-01-11 RX ADMIN — SODIUM CHLORIDE, PRESERVATIVE FREE 10 ML: 5 INJECTION INTRAVENOUS at 21:58

## 2025-01-11 RX ADMIN — ENOXAPARIN SODIUM 30 MG: 30 INJECTION SUBCUTANEOUS at 01:56

## 2025-01-11 RX ADMIN — MORPHINE SULFATE 2 MG: 2 INJECTION, SOLUTION INTRAMUSCULAR; INTRAVENOUS at 05:39

## 2025-01-11 RX ADMIN — SODIUM CHLORIDE, PRESERVATIVE FREE 10 ML: 5 INJECTION INTRAVENOUS at 09:52

## 2025-01-11 RX ADMIN — FUROSEMIDE 40 MG: 10 INJECTION, SOLUTION INTRAMUSCULAR; INTRAVENOUS at 01:58

## 2025-01-11 RX ADMIN — LINACLOTIDE 145 MCG: 145 CAPSULE, GELATIN COATED ORAL at 09:48

## 2025-01-11 RX ADMIN — WATER 2.74 MCG: 1 INJECTION INTRAMUSCULAR; INTRAVENOUS; SUBCUTANEOUS at 02:38

## 2025-01-11 RX ADMIN — MORPHINE SULFATE 2 MG: 2 INJECTION, SOLUTION INTRAMUSCULAR; INTRAVENOUS at 21:54

## 2025-01-11 RX ADMIN — ENOXAPARIN SODIUM 30 MG: 30 INJECTION SUBCUTANEOUS at 09:48

## 2025-01-11 RX ADMIN — LIOTHYRONINE SODIUM 10 MCG: 10 INJECTION, SOLUTION INTRAVENOUS at 09:47

## 2025-01-11 RX ADMIN — MEBROFENIN 6.4 MILLICURIE: 45 INJECTION, POWDER, LYOPHILIZED, FOR SOLUTION INTRAVENOUS at 03:10

## 2025-01-11 RX ADMIN — ENOXAPARIN SODIUM 30 MG: 30 INJECTION SUBCUTANEOUS at 20:46

## 2025-01-11 ASSESSMENT — PAIN DESCRIPTION - ORIENTATION
ORIENTATION: ANTERIOR

## 2025-01-11 ASSESSMENT — PAIN SCALES - GENERAL
PAINLEVEL_OUTOF10: 4
PAINLEVEL_OUTOF10: 6
PAINLEVEL_OUTOF10: 7
PAINLEVEL_OUTOF10: 0
PAINLEVEL_OUTOF10: 8
PAINLEVEL_OUTOF10: 0
PAINLEVEL_OUTOF10: 4
PAINLEVEL_OUTOF10: 0

## 2025-01-11 ASSESSMENT — PAIN DESCRIPTION - DESCRIPTORS
DESCRIPTORS: ACHING
DESCRIPTORS: ACHING;CRAMPING
DESCRIPTORS: ACHING
DESCRIPTORS: ACHING;DULL

## 2025-01-11 ASSESSMENT — PAIN DESCRIPTION - FREQUENCY
FREQUENCY: CONTINUOUS
FREQUENCY: CONTINUOUS

## 2025-01-11 ASSESSMENT — PAIN DESCRIPTION - LOCATION
LOCATION: ABDOMEN

## 2025-01-11 ASSESSMENT — PAIN DESCRIPTION - PAIN TYPE
TYPE: ACUTE PAIN

## 2025-01-11 ASSESSMENT — PAIN - FUNCTIONAL ASSESSMENT: PAIN_FUNCTIONAL_ASSESSMENT: ACTIVITIES ARE NOT PREVENTED

## 2025-01-11 NOTE — PROGRESS NOTES
RN called requested cpap for nabil pt. Placed cpap in room. Pt did not want to go on yet. She was eating. Informed RN

## 2025-01-11 NOTE — ED NOTES
Family member at bedside; stated concern regarding patients respiratory status.  Pt satting 98% on RA; placed on 2L NC for comfort

## 2025-01-11 NOTE — PROGRESS NOTES
[facility]  Progress Note    Patient: Aditya Gusman Age: 34 y.o. : 1990 MR#: [id number] SSN: [id number]  Date/Time: 2025     Subjective:     Patient seen evaluated, lying in bed, no acute distress.    34-year-old female with a history of hypothyroidism, morbid obesity, depression, hypertension, heart failure with preserved EF, substance abuse/use presents to the emergency room secondary to abdominal pain.  Patient has a known history of myxedema coma in the past requiring intubation and ICU admission.  Patient has been noncompliant with her medications.  ER evaluation-CT abdomen pelvis did not show any acute changes.  Thyroid panel shows TSH of 66.3, free T4 of 0.4 consistent with severe hypothyroidism.  Patient's thyroid supplementation has been started, patient admitted to the hospital for further evaluation.        Assessment/Plan:     Severe hypothyroidism likely secondary to medical noncompliance-patient received IV Synthroid in the emergency room, continue oral Synthroid and Cytomel.  Repeat TSH and free T4 in AM.  Super morbid obesity  History of ART-continue CPAP at bedtime  Abdominal pain-has resolved.  CT abdomen pelvis reviewed.  Lower extremity edema chronic-continue home dose of Lasix  PT and OT eval and treatment  DVT prophylaxis-Lovenox  Full code            Anticipated Discharge and Disposition:    -Home    Gael Thomas MD  25      Case discussed with:  [x]Patient  []Family  []Nursing  []Case Management  DVT Prophylaxis:  [x]Lovenox  []Hep SQ  []SCDs  []Coumadin   []On Heparin gtt    Objective:   VS:   Vitals:    25 1138   BP: 107/64   Pulse: 80   Resp: 22   Temp: 97.8 °F (36.6 °C)   SpO2: 99%        Intake/Output Summary (Last 24 hours) at 2025 1510  Last data filed at 2025 0137  Gross per 24 hour   Intake --   Output 200 ml   Net -200 ml       General:  Alert, cooperative, no acute distress    Pulmonary:  CTA

## 2025-01-11 NOTE — PROGRESS NOTES
Progress Note:  Assumed care of patient in bed awake but falling asleep and snoring loudly. Able to answer questions. Now in wheel chair, gone to nuclear med,. No s/s of acute distress or discomfort. Patient on 4 lpm by nasal cannula.  4:16 AM  Remains in nuclear medicine.  5:45 AM  Resting in bed, remains on 5 lpm Medicated for continued c/o pain, call bell within reach.

## 2025-01-11 NOTE — ED NOTES
TRANSFER - OUT REPORT:    Verbal report given to CASA Ca on Aditya Gusman  being transferred to [facility] for routine progression of patient care       Report consisted of patient's Situation, Background, Assessment and   Recommendations(SBAR).     Information from the following report(s) Nurse Handoff Report was reviewed with the receiving nurse.    Michael Fall Assessment:    Presents to emergency department  because of falls (Syncope, seizure, or loss of consciousness): No  Age > 70: No  Altered Mental Status, Intoxication with alcohol or substance confusion (Disorientation, impaired judgment, poor safety awaremess, or inability to follow instructions): No  Impaired Mobility: Ambulates or transfers with assistive devices or assistance; Unable to ambulate or transer.: No  Nursing Judgement: No          Lines:   Peripheral IV 01/10/25 Right Antecubital (Active)        CRITICAL LABS:     Verbally repeated the following test results Lab/POC: K+ 3.3, TSH 66.30, T4 0.4 to CASA Ca RN     Opportunity for questions and clarification was provided.      Patient transported with:  Tech

## 2025-01-11 NOTE — H&P
105 100 - 111 mmol/L    CO2 31 21 - 32 mmol/L    Anion Gap 5 3.0 - 18 mmol/L    Glucose 96 74 - 99 mg/dL    BUN 14 7.0 - 18 MG/DL    Creatinine 0.95 0.6 - 1.3 MG/DL    BUN/Creatinine Ratio 15 12 - 20      Est, Glom Filt Rate 81 >60 ml/min/1.73m2    Calcium 9.2 8.5 - 10.1 MG/DL   Lipase    Collection Time: 01/10/25 12:30 PM   Result Value Ref Range    Lipase 15 13 - 75 U/L   Hepatic Function Panel    Collection Time: 01/10/25 12:30 PM   Result Value Ref Range    Total Protein 7.8 6.4 - 8.2 g/dL    Albumin 3.9 3.4 - 5.0 g/dL    Globulin 3.9 2.0 - 4.0 g/dL    Albumin/Globulin Ratio 1.0 0.8 - 1.7      Total Bilirubin 0.6 0.2 - 1.0 MG/DL    Bilirubin, Direct 0.2 0.0 - 0.2 MG/DL    Alk Phosphatase 78 45 - 117 U/L    AST 10 10 - 38 U/L    ALT 12 (L) 13 - 56 U/L   HCG, Quantitative, Pregnancy    Collection Time: 01/10/25 12:30 PM   Result Value Ref Range    hCG Quant <1 0 - 10 MIU/ML   Troponin    Collection Time: 01/10/25 12:30 PM   Result Value Ref Range    Troponin, High Sensitivity 11 0 - 54 ng/L   Brain Natriuretic Peptide    Collection Time: 01/10/25 12:30 PM   Result Value Ref Range    NT Pro- (H) 0 - 450 PG/ML   TSH    Collection Time: 01/10/25 12:30 PM   Result Value Ref Range    TSH, 3rd Generation 66.30 (H) 0.36 - 3.74 uIU/mL   T4, Free    Collection Time: 01/10/25 12:30 PM   Result Value Ref Range    T4 Free 0.4 (L) 0.7 - 1.5 NG/DL   Respiratory Panel, Molecular, with COVID-19 (Restricted: peds pts or suitable admitted adults)    Collection Time: 01/10/25 12:30 PM    Specimen: Nasopharyngeal   Result Value Ref Range    Adenovirus by PCR Not detected NOTD      Coronavirus 229E by PCR Not detected NOTD      Coronavirus HKU1 by PCR Not detected NOTD      Coronavirus NL63 by PCR Not detected NOTD      Coronavirus OC43 by PCR Not detected NOTD      SARS-CoV-2, PCR Not detected NOTD      Human Metapneumovirus by PCR Not detected NOTD      Rhinovirus Enterovirus PCR Not detected NOTD      Influenza A by PCR Not

## 2025-01-12 LAB
T4 FREE SERPL-MCNC: 0.7 NG/DL (ref 0.7–1.5)
TSH SERPL DL<=0.05 MIU/L-ACNC: 46.3 UIU/ML (ref 0.36–3.74)

## 2025-01-12 PROCEDURE — 87086 URINE CULTURE/COLONY COUNT: CPT

## 2025-01-12 PROCEDURE — 84439 ASSAY OF FREE THYROXINE: CPT

## 2025-01-12 PROCEDURE — 6360000002 HC RX W HCPCS: Performed by: HOSPITALIST

## 2025-01-12 PROCEDURE — 84443 ASSAY THYROID STIM HORMONE: CPT

## 2025-01-12 PROCEDURE — 99232 SBSQ HOSP IP/OBS MODERATE 35: CPT | Performed by: HOSPITALIST

## 2025-01-12 PROCEDURE — 6360000002 HC RX W HCPCS: Performed by: STUDENT IN AN ORGANIZED HEALTH CARE EDUCATION/TRAINING PROGRAM

## 2025-01-12 PROCEDURE — 2500000003 HC RX 250 WO HCPCS: Performed by: STUDENT IN AN ORGANIZED HEALTH CARE EDUCATION/TRAINING PROGRAM

## 2025-01-12 PROCEDURE — 36415 COLL VENOUS BLD VENIPUNCTURE: CPT

## 2025-01-12 PROCEDURE — 94660 CPAP INITIATION&MGMT: CPT

## 2025-01-12 PROCEDURE — 6370000000 HC RX 637 (ALT 250 FOR IP): Performed by: HOSPITALIST

## 2025-01-12 PROCEDURE — 1100000000 HC RM PRIVATE

## 2025-01-12 PROCEDURE — 2500000003 HC RX 250 WO HCPCS: Performed by: HOSPITALIST

## 2025-01-12 PROCEDURE — 6370000000 HC RX 637 (ALT 250 FOR IP): Performed by: STUDENT IN AN ORGANIZED HEALTH CARE EDUCATION/TRAINING PROGRAM

## 2025-01-12 RX ADMIN — LEVOTHYROXINE SODIUM 200 MCG: 100 TABLET ORAL at 07:02

## 2025-01-12 RX ADMIN — LIOTHYRONINE SODIUM 5 MCG: 5 TABLET ORAL at 09:04

## 2025-01-12 RX ADMIN — FUROSEMIDE 20 MG: 20 TABLET ORAL at 09:04

## 2025-01-12 RX ADMIN — ENOXAPARIN SODIUM 30 MG: 30 INJECTION SUBCUTANEOUS at 09:04

## 2025-01-12 RX ADMIN — MORPHINE SULFATE 2 MG: 2 INJECTION, SOLUTION INTRAMUSCULAR; INTRAVENOUS at 15:29

## 2025-01-12 RX ADMIN — LINACLOTIDE 145 MCG: 145 CAPSULE, GELATIN COATED ORAL at 09:04

## 2025-01-12 RX ADMIN — SODIUM CHLORIDE, PRESERVATIVE FREE 10 ML: 5 INJECTION INTRAVENOUS at 09:06

## 2025-01-12 RX ADMIN — WATER 1000 MG: 1 INJECTION INTRAMUSCULAR; INTRAVENOUS; SUBCUTANEOUS at 15:29

## 2025-01-12 RX ADMIN — SODIUM CHLORIDE, PRESERVATIVE FREE 10 ML: 5 INJECTION INTRAVENOUS at 21:03

## 2025-01-12 RX ADMIN — MORPHINE SULFATE 2 MG: 2 INJECTION, SOLUTION INTRAMUSCULAR; INTRAVENOUS at 03:42

## 2025-01-12 RX ADMIN — ENOXAPARIN SODIUM 30 MG: 30 INJECTION SUBCUTANEOUS at 21:01

## 2025-01-12 RX ADMIN — MORPHINE SULFATE 2 MG: 2 INJECTION, SOLUTION INTRAMUSCULAR; INTRAVENOUS at 20:58

## 2025-01-12 ASSESSMENT — PAIN DESCRIPTION - LOCATION
LOCATION: ABDOMEN

## 2025-01-12 ASSESSMENT — PAIN DESCRIPTION - DESCRIPTORS
DESCRIPTORS: ACHING;DULL
DESCRIPTORS: ACHING;CRAMPING
DESCRIPTORS: ACHING

## 2025-01-12 ASSESSMENT — PAIN - FUNCTIONAL ASSESSMENT
PAIN_FUNCTIONAL_ASSESSMENT: ACTIVITIES ARE NOT PREVENTED
PAIN_FUNCTIONAL_ASSESSMENT: PREVENTS OR INTERFERES SOME ACTIVE ACTIVITIES AND ADLS
PAIN_FUNCTIONAL_ASSESSMENT: ACTIVITIES ARE NOT PREVENTED

## 2025-01-12 ASSESSMENT — PAIN SCALES - GENERAL
PAINLEVEL_OUTOF10: 8
PAINLEVEL_OUTOF10: 4
PAINLEVEL_OUTOF10: 6
PAINLEVEL_OUTOF10: 0
PAINLEVEL_OUTOF10: 4
PAINLEVEL_OUTOF10: 5
PAINLEVEL_OUTOF10: 7

## 2025-01-12 ASSESSMENT — PAIN DESCRIPTION - ONSET
ONSET: ON-GOING
ONSET: ON-GOING

## 2025-01-12 ASSESSMENT — PAIN DESCRIPTION - FREQUENCY
FREQUENCY: INTERMITTENT
FREQUENCY: INTERMITTENT

## 2025-01-12 ASSESSMENT — PAIN DESCRIPTION - PAIN TYPE
TYPE: ACUTE PAIN
TYPE: ACUTE PAIN

## 2025-01-12 ASSESSMENT — PAIN DESCRIPTION - ORIENTATION
ORIENTATION: MID
ORIENTATION: ANTERIOR
ORIENTATION: UPPER

## 2025-01-12 NOTE — PROGRESS NOTES
[facility] Hospitalist Group  Progress Note    Patient: Aditya Gusman Age: 34 y.o. : 1990 MR#: [id number] SSN: [id number]  Date/Time: 2025     Subjective:     Patient seen evaluated, lying in bed, no acute distress.    34-year-old female with a history of hypothyroidism, morbid obesity, depression, hypertension, heart failure with preserved EF, substance abuse/use presents to the emergency room secondary to abdominal pain.  Patient has a known history of myxedema coma in the past requiring intubation and ICU admission.  Patient has been noncompliant with her medications.  ER evaluation-CT abdomen pelvis did not show any acute changes.  Thyroid panel shows TSH of 66.3, free T4 of 0.4 consistent with severe hypothyroidism.  Patient's thyroid supplementation has been started, patient admitted to the hospital for further evaluation.    -patient seen evaluated, lying in bed, no acute distress.  Continue current medications, awaiting lab results for thyroid function.  Patient noted to have an extremely foul-smelling urine, urine culture sent.  Empirically started on IV antibiotics since UA shows 4+ bacteria    Assessment/Plan:     Severe hypothyroidism likely secondary to medical noncompliance-patient received IV Synthroid in the emergency room, continue oral Synthroid and Cytomel.  Repeat TSH and free T4 in AM.  ?  UTI-urine culture sent, start empiric antibiotics.  Super morbid obesity  History of ART-continue CPAP at bedtime  Abdominal pain-has resolved.  CT abdomen pelvis reviewed.  Lower extremity edema chronic-continue home dose of Lasix  PT and OT eval and treatment  DVT prophylaxis-Lovenox  Full code            Anticipated Discharge and Disposition:    -Home    Gael Thomas MD  25      Case discussed with:  [x]Patient  []Family  []Nursing  []Case Management  DVT Prophylaxis:  [x]Lovenox  []Hep SQ  []SCDs  []Coumadin   []On Heparin gtt    Objective:   VS:

## 2025-01-12 NOTE — PROGRESS NOTES
4 Eyes Skin Assessment     NAME:  Aditya Gusman  YOB: 1990  MEDICAL RECORD NUMBER:  [id number]    The patient is being assessed for  Shift Handoff    I agree that at least one RN has performed a thorough Head to Toe Skin Assessment on the patient. ALL assessment sites listed below have been assessed.      Areas assessed by both nurses:    Sacrum. Buttock, Coccyx, Ischium and Legs. Feet and Heels        Does the Patient have a Wound? No noted wound(s)       Jeffrey Prevention initiated by RN: Yes  Wound Care Orders initiated by RN: No    Pressure Injury (Stage 3,4, Unstageable, DTI, NWPT, and Complex wounds) if present, place Wound referral order by RN under : No    New Ostomies, if present place, Ostomy referral order under : No     Nurse 1 eSignature: Electronically signed by Sirena Putnam RN on 1/12/25 at 8:09 AM EST    **SHARE this note so that the co-signing nurse can place an eSignature**    Nurse 2 eSignature: {Esignature:[id number]}

## 2025-01-13 VITALS
HEART RATE: 94 BPM | RESPIRATION RATE: 17 BRPM | BODY MASS INDEX: 55.32 KG/M2 | TEMPERATURE: 98.9 F | HEIGHT: 61 IN | SYSTOLIC BLOOD PRESSURE: 144 MMHG | DIASTOLIC BLOOD PRESSURE: 84 MMHG | OXYGEN SATURATION: 96 % | WEIGHT: 293 LBS

## 2025-01-13 PROCEDURE — 6360000002 HC RX W HCPCS: Performed by: STUDENT IN AN ORGANIZED HEALTH CARE EDUCATION/TRAINING PROGRAM

## 2025-01-13 PROCEDURE — 6370000000 HC RX 637 (ALT 250 FOR IP): Performed by: HOSPITALIST

## 2025-01-13 PROCEDURE — 2500000003 HC RX 250 WO HCPCS: Performed by: STUDENT IN AN ORGANIZED HEALTH CARE EDUCATION/TRAINING PROGRAM

## 2025-01-13 PROCEDURE — 6370000000 HC RX 637 (ALT 250 FOR IP): Performed by: STUDENT IN AN ORGANIZED HEALTH CARE EDUCATION/TRAINING PROGRAM

## 2025-01-13 PROCEDURE — 94761 N-INVAS EAR/PLS OXIMETRY MLT: CPT

## 2025-01-13 PROCEDURE — 99239 HOSP IP/OBS DSCHRG MGMT >30: CPT | Performed by: HOSPITALIST

## 2025-01-13 RX ORDER — CEFDINIR 300 MG/1
300 CAPSULE ORAL 2 TIMES DAILY
Qty: 10 CAPSULE | Refills: 0 | Status: SHIPPED | OUTPATIENT
Start: 2025-01-13 | End: 2025-01-18

## 2025-01-13 RX ADMIN — SODIUM CHLORIDE, PRESERVATIVE FREE 10 ML: 5 INJECTION INTRAVENOUS at 10:28

## 2025-01-13 RX ADMIN — LINACLOTIDE 145 MCG: 145 CAPSULE, GELATIN COATED ORAL at 10:28

## 2025-01-13 RX ADMIN — FUROSEMIDE 20 MG: 20 TABLET ORAL at 10:28

## 2025-01-13 RX ADMIN — LEVOTHYROXINE SODIUM 200 MCG: 100 TABLET ORAL at 05:59

## 2025-01-13 RX ADMIN — LIOTHYRONINE SODIUM 5 MCG: 5 TABLET ORAL at 10:28

## 2025-01-13 RX ADMIN — ENOXAPARIN SODIUM 30 MG: 30 INJECTION SUBCUTANEOUS at 10:28

## 2025-01-13 ASSESSMENT — PAIN SCALES - GENERAL
PAINLEVEL_OUTOF10: 0

## 2025-01-13 ASSESSMENT — PAIN SCALES - WONG BAKER
WONGBAKER_NUMERICALRESPONSE: NO HURT
WONGBAKER_NUMERICALRESPONSE: NO HURT

## 2025-01-13 NOTE — DISCHARGE SUMMARY
Hospitalist Discharge Summary      Patient: Aditya Gusman MRN: [id number]  CSN: [id number]    YOB: 1990  Age: 34 y.o.  Sex: female    DOA: 1/10/2025 LOS:  LOS: 3 days   Discharge Date:     Admission Diagnoses: Weakness [R53.1]  Generalized abdominal pain [R10.84]  Hypothyroid [E03.9]  General weakness [R53.1]  Noncompliance with medication regimen [Z91.148]  Hypothyroidism, unspecified type [E03.9]    Discharge Diagnoses:    Severe hypothyroidism secondary to noncompliance with medications  Super morbid obesity  History of ART  Abdominal pain-resolved  Lower extremity edema chronic    Discharge Condition: Fair    Discharge To: Home    CODE STATUS: Full Code         PHYSICAL EXAM  Visit Vitals  BP (!) 168/94   Pulse 92   Temp 98 °F (36.7 °C) (Oral)   Resp 18   Ht 1.549 m (5' 1\")   Wt (!) 137.1 kg (302 lb 4.8 oz)   SpO2 93%   BMI 57.12 kg/m²       General: Alert, cooperative, no acute distress    Lungs:  CTA Bilaterally. No Wheezing/Rhonchi/Rales.  Heart:  Regular rate and Rhythm.  Abdomen: Soft, Non distended, Non tender. + Bowel sounds.  Extremities: No edema/ cyanosis/ clubbing  Neurologic:  AA oriented X 3. Moves all extremities.                                HPI:    34-year-old female past medical history of depression, hypertension, heart failure with preserved EF, substance use/abuse, hypothyroidism who presents for abdominal pain.  Patient has had a history of myxedema coma in the past requiring intubation and ICU admission in 2024.  She has known noncompliance with medications including Synthroid, Lasix.  States to have chronic abdominal pain with some slightly worsening today concerning for previous episodes of severe hypothyroidism.  Describes crampy abdominal pain that is diffuse with radiation to the right upper quadrant.  Denies any diarrhea constipation nausea vomiting melena hematochezia.  She also describes feeling increasingly weak fatigued with generalized malaise and aches.

## 2025-01-13 NOTE — PROGRESS NOTES
2029 Patient was woken up to ask if they need assistance with CPAP machine. Patient states that it is to bulky but they are willing to place on O2 tonight. Patient assisted with placing on 2lpm NC.

## 2025-01-13 NOTE — PROGRESS NOTES
4 Eyes Skin Assessment     NAME:  Aditya Gusman  YOB: 1990  MEDICAL RECORD NUMBER:  [id number]    The patient is being assessed for  Shift Handoff    I agree that at least one RN has performed a thorough Head to Toe Skin Assessment on the patient. ALL assessment sites listed below have been assessed.      Areas assessed by both nurses:    Head, Face, Ears, Shoulders, Back, Chest, Arms, Elbows, Hands, Sacrum. Buttock, Coccyx, Ischium, Legs. Feet and Heels, and Under Medical Devices         Does the Patient have a Wound? No noted wound(s)       Jeffrey Prevention initiated by RN: No  Wound Care Orders initiated by RN: No    Pressure Injury (Stage 3,4, Unstageable, DTI, NWPT, and Complex wounds) if present, place Wound referral order by RN under : No    New Ostomies, if present place, Ostomy referral order under : No     Nurse 1 eSignature: Electronically signed by LEESA CANCINO RN on 1/13/25 at 7:58 AM EST    **SHARE this note so that the co-signing nurse can place an eSignature**    Nurse 2 eSignature: {Esignature:[id number]}

## 2025-01-13 NOTE — PLAN OF CARE
Problem: Chronic Conditions and Co-morbidities  Goal: Patient's chronic conditions and co-morbidity symptoms are monitored and maintained or improved  Outcome: Progressing  Flowsheets (Taken 1/11/2025 0130 by Lanny Herbert, RN)  Care Plan - Patient's Chronic Conditions and Co-Morbidity Symptoms are Monitored and Maintained or Improved: Monitor and assess patient's chronic conditions and comorbid symptoms for stability, deterioration, or improvement  Note: Pt has a hx of hypothyroidism, pt now being medicated for diagnosis as she has been noncompliant with medications at home.     Problem: Discharge Planning  Goal: Discharge to home or other facility with appropriate resources  Outcome: Progressing  Flowsheets (Taken 1/11/2025 0130 by Lanny Herbert, RN)  Discharge to home or other facility with appropriate resources: Identify barriers to discharge with patient and caregiver  Note: Pt labs are being closely monitored, CT also done d/t complaint of abdominal pain which is negative     Problem: Pain  Goal: Verbalizes/displays adequate comfort level or baseline comfort level  Outcome: Progressing  Flowsheets (Taken 1/11/2025 7867)  Verbalizes/displays adequate comfort level or baseline comfort level: Encourage patient to monitor pain and request assistance  Note: Pt has had no complaints of pain aside from some mild soreness.     Problem: Skin/Tissue Integrity  Goal: Absence of new skin breakdown  Description: 1.  Monitor for areas of redness and/or skin breakdown  2.  Assess vascular access sites hourly  3.  Every 4-6 hours minimum:  Change oxygen saturation probe site  4.  Every 4-6 hours:  If on nasal continuous positive airway pressure, respiratory therapy assess nares and determine need for appliance change or resting period.  Outcome: Progressing  Note: Skin intact with vascular discoloration on B/L lower leg     Problem: Respiratory - Adult  Goal: Achieves optimal ventilation and 
  Problem: Chronic Conditions and Co-morbidities  Goal: Patient's chronic conditions and co-morbidity symptoms are monitored and maintained or improved  Outcome: Progressing  Flowsheets (Taken 1/12/2025 2015)  Care Plan - Patient's Chronic Conditions and Co-Morbidity Symptoms are Monitored and Maintained or Improved: Monitor and assess patient's chronic conditions and comorbid symptoms for stability, deterioration, or improvement     Problem: Pain  Goal: Verbalizes/displays adequate comfort level or baseline comfort level  Outcome: Progressing  Flowsheets (Taken 1/12/2025 2015)  Verbalizes/displays adequate comfort level or baseline comfort level:   Encourage patient to monitor pain and request assistance   Assess pain using appropriate pain scale   Administer analgesics based on type and severity of pain and evaluate response   Implement non-pharmacological measures as appropriate and evaluate response     Problem: Skin/Tissue Integrity  Goal: Absence of new skin breakdown  Description: 1.  Monitor for areas of redness and/or skin breakdown  2.  Assess vascular access sites hourly  3.  Every 4-6 hours minimum:  Change oxygen saturation probe site  4.  Every 4-6 hours:  If on nasal continuous positive airway pressure, respiratory therapy assess nares and determine need for appliance change or resting period.  Outcome: Progressing  Note: No skin breakdown noted.     Problem: Respiratory - Adult  Goal: Achieves optimal ventilation and oxygenation  Outcome: Progressing  Flowsheets (Taken 1/12/2025 2015)  Achieves optimal ventilation and oxygenation:   Assess for changes in respiratory status   Assess for changes in mentation and behavior     Problem: Safety - Adult  Goal: Free from fall injury  Outcome: Progressing  Flowsheets (Taken 1/12/2025 2015)  Free From Fall Injury: Instruct family/caregiver on patient safety     
pain and request assistance  1/11/2025 1847 by Mignon Eaton, RN  Outcome: Progressing  Flowsheets (Taken 1/11/2025 1847)  Verbalizes/displays adequate comfort level or baseline comfort level: Encourage patient to monitor pain and request assistance  Note: Pt has had no complaints of pain aside from some mild soreness.     Problem: Skin/Tissue Integrity  Goal: Absence of new skin breakdown  Description: 1.  Monitor for areas of redness and/or skin breakdown  2.  Assess vascular access sites hourly  3.  Every 4-6 hours minimum:  Change oxygen saturation probe site  4.  Every 4-6 hours:  If on nasal continuous positive airway pressure, respiratory therapy assess nares and determine need for appliance change or resting period.  1/11/2025 1847 by Mignon Eaton, RN  Outcome: Progressing  Note: Skin intact with vascular discoloration on B/L lower leg     Problem: Respiratory - Adult  Goal: Achieves optimal ventilation and oxygenation  1/12/2025 0810 by Sirena Putnam RN  Outcome: Progressing  Flowsheets (Taken 1/11/2025 2000)  Achieves optimal ventilation and oxygenation: Assess for changes in respiratory status  1/11/2025 1847 by Mignon Eaton, RN  Outcome: Progressing  Flowsheets (Taken 1/11/2025 1847)  Achieves optimal ventilation and oxygenation:   Assess for changes in respiratory status   Assess and instruct to report shortness of breath or any respiratory difficulty  Note: Pt has severe ART New order placed for CPAP machine     Problem: Safety - Adult  Goal: Free from fall injury  Outcome: Progressing  Flowsheets (Taken 1/12/2025 0810)  Free From Fall Injury: Instruct family/caregiver on patient safety

## 2025-01-13 NOTE — CARE COORDINATION
Patient discharging home today, no CM needs, CM unable to do CMIA for patient, patient has a ride home.             Cele Ruiz, CASA  Case Management [phone]

## 2025-01-14 ENCOUNTER — CLINICAL DOCUMENTATION (OUTPATIENT)
Facility: CLINIC | Age: 35
End: 2025-01-14

## 2025-01-14 LAB
BACTERIA SPEC CULT: NORMAL
SERVICE CMNT-IMP: NORMAL

## 2025-01-14 NOTE — PROGRESS NOTES
PATIENT REQUIRES TCM OUTREACH    MRN: [id number]     PATIENT:  Aditya Gusman    ADMIT DATE:  1/10/255    DISCHARGE DATE:  1/13/25     ADMITTING DX: hypothyroidism    MEDICATION CHANGES:   Start: Omnicef    Stop: Tylenol  Change:    SPECIALISTS:   n/a    HOME HEALTH/WOUND CARE/PT/OT:  none    SCHEDULED FOLLOW UP APPTS:  No future appointments.    *Please contact patient within 2 business days and document under .TCMOFFICE.  A scheduled Hospital Follow-up for patient within 7 days is preferred*

## 2025-08-12 ENCOUNTER — APPOINTMENT (OUTPATIENT)
Facility: HOSPITAL | Age: 35
End: 2025-08-12
Payer: MEDICAID

## 2025-08-12 ENCOUNTER — HOSPITAL ENCOUNTER (INPATIENT)
Facility: HOSPITAL | Age: 35
LOS: 3 days | Discharge: HOME | End: 2025-08-15
Attending: EMERGENCY MEDICINE | Admitting: INTERNAL MEDICINE
Payer: MEDICAID

## 2025-08-12 DIAGNOSIS — E03.5 MYXEDEMA COMA (HCC): Primary | ICD-10-CM

## 2025-08-12 DIAGNOSIS — F19.10 SUBSTANCE ABUSE (HCC): ICD-10-CM

## 2025-08-12 LAB
ALBUMIN SERPL-MCNC: 3.7 G/DL (ref 3.4–5)
ALBUMIN/GLOB SERPL: 0.9 (ref 0.8–1.7)
ALP SERPL-CCNC: 73 U/L (ref 45–117)
ALT SERPL-CCNC: 13 U/L (ref 10–35)
AMPHET UR QL SCN: POSITIVE
ANION GAP BLD CALC-SCNC: ABNORMAL MMOL/L (ref 10–20)
ANION GAP SERPL CALC-SCNC: 12 MMOL/L (ref 3–18)
APAP SERPL-MCNC: <5 UG/ML (ref 10–30)
APPEARANCE UR: CLEAR
ARTERIAL PATENCY WRIST A: POSITIVE
AST SERPL-CCNC: 35 U/L (ref 10–38)
BACTERIA URNS QL MICRO: NEGATIVE /HPF
BARBITURATES UR QL SCN: NEGATIVE
BASE EXCESS BLD CALC-SCNC: 9.8 MMOL/L
BDY SITE: ABNORMAL
BENZODIAZ UR QL: NEGATIVE
BILIRUB SERPL-MCNC: 0.7 MG/DL (ref 0.2–1)
BILIRUB UR QL: NEGATIVE
BUN SERPL-MCNC: 14 MG/DL (ref 6–23)
BUN/CREAT SERPL: 14 (ref 12–20)
CA-I BLD-MCNC: 1.19 MMOL/L (ref 1.15–1.33)
CALCIUM SERPL-MCNC: 9.7 MG/DL (ref 8.5–10.1)
CANNABINOIDS UR QL SCN: NEGATIVE
CHLORIDE BLD-SCNC: 95 MMOL/L (ref 98–107)
CHLORIDE SERPL-SCNC: 94 MMOL/L (ref 98–107)
CO2 BLD-SCNC: 35 MMOL/L (ref 22–29)
CO2 SERPL-SCNC: 29 MMOL/L (ref 21–32)
COCAINE UR QL SCN: NEGATIVE
COLOR UR: YELLOW
CREAT BLD-MCNC: 1.02 MG/DL (ref 0.6–1.3)
CREAT SERPL-MCNC: 0.95 MG/DL (ref 0.6–1.3)
EKG ATRIAL RATE: 65 BPM
EKG DIAGNOSIS: NORMAL
EKG P AXIS: 65 DEGREES
EKG P-R INTERVAL: 192 MS
EKG Q-T INTERVAL: 514 MS
EKG QRS DURATION: 90 MS
EKG QTC CALCULATION (BAZETT): 534 MS
EKG R AXIS: 108 DEGREES
EKG T AXIS: 64 DEGREES
EKG VENTRICULAR RATE: 65 BPM
EPITH CASTS URNS QL MICRO: NORMAL /LPF (ref 0–5)
ERYTHROCYTE [DISTWIDTH] IN BLOOD BY AUTOMATED COUNT: 15.8 % (ref 11.6–14.5)
ETHANOL SERPL-MCNC: <11 MG/DL (ref 0–0.08)
FENTANYL: NEGATIVE
GAS FLOW.O2 O2 DELIVERY SYS: ABNORMAL
GLOBULIN SER CALC-MCNC: 4 G/DL (ref 2–4)
GLUCOSE BLD STRIP.AUTO-MCNC: 126 MG/DL (ref 70–110)
GLUCOSE BLD STRIP.AUTO-MCNC: 133 MG/DL (ref 70–110)
GLUCOSE BLD STRIP.AUTO-MCNC: 158 MG/DL (ref 70–110)
GLUCOSE BLD-MCNC: 124 MG/DL (ref 74–99)
GLUCOSE SERPL-MCNC: 116 MG/DL (ref 74–108)
GLUCOSE UR STRIP.AUTO-MCNC: NEGATIVE MG/DL
HCG SERPL QL: NEGATIVE
HCO3 BLD-SCNC: 36.4 MMOL/L (ref 21–28)
HCT VFR BLD AUTO: 36.2 % (ref 35–45)
HGB BLD-MCNC: 10.8 G/DL (ref 12–16)
HGB UR QL STRIP: NEGATIVE
KETONES UR QL STRIP.AUTO: NEGATIVE MG/DL
LACTATE BLD-SCNC: 0.94 MMOL/L (ref 0.4–2)
LACTATE SERPL-SCNC: 1.1 MMOL/L (ref 0.4–2)
LEUKOCYTE ESTERASE UR QL STRIP.AUTO: NEGATIVE
Lab: ABNORMAL
MCH RBC QN AUTO: 26.1 PG (ref 24–34)
MCHC RBC AUTO-ENTMCNC: 29.8 G/DL (ref 31–37)
MCV RBC AUTO: 87.4 FL (ref 78–100)
METHADONE UR QL: NEGATIVE
NITRITE UR QL STRIP.AUTO: NEGATIVE
NRBC # BLD: 0 K/UL (ref 0–0.01)
NRBC BLD-RTO: 0 PER 100 WBC
OPIATES UR QL: NEGATIVE
OXYCODONE UR QL SCN: NEGATIVE
PCO2 BLD: 57.1 MMHG (ref 35–48)
PH BLD: 7.41 (ref 7.35–7.45)
PH UR STRIP: 6.5 (ref 5–8)
PLATELET # BLD AUTO: 270 K/UL (ref 135–420)
PMV BLD AUTO: 11.7 FL (ref 9.2–11.8)
PO2 BLD: 68 MMHG (ref 83–108)
POTASSIUM BLD-SCNC: 3.6 MMOL/L (ref 3.5–5.1)
POTASSIUM SERPL-SCNC: 3.7 MMOL/L (ref 3.5–5.5)
PROCALCITONIN SERPL-MCNC: 0.04 NG/ML
PROT SERPL-MCNC: 7.7 G/DL (ref 6.4–8.2)
PROT UR STRIP-MCNC: ABNORMAL MG/DL
RBC # BLD AUTO: 4.14 M/UL (ref 4.2–5.3)
RBC #/AREA URNS HPF: NEGATIVE /HPF (ref 0–5)
RESPIRATORY RATE, POC: 17 (ref 5–40)
SALICYLATES SERPL-MCNC: <0.5 MG/DL (ref 3–10)
SAO2 % BLD: 93 % (ref 94–98)
SERVICE CMNT-IMP: ABNORMAL
SODIUM BLD-SCNC: 141 MMOL/L (ref 136–145)
SODIUM SERPL-SCNC: 135 MMOL/L (ref 136–145)
SP GR UR REFRACTOMETRY: 1.01 (ref 1–1.03)
SPECIMEN SITE: ABNORMAL
T4 FREE SERPL-MCNC: <0.2 NG/DL (ref 0.9–1.7)
TROPONIN T SERPL HS-MCNC: 13.6 NG/L (ref 0–14)
TROPONIN T SERPL HS-MCNC: 15.8 NG/L (ref 0–14)
TSH, 3RD GENERATION: 65.3 UIU/ML (ref 0.27–4.2)
UROBILINOGEN UR QL STRIP.AUTO: 0.2 EU/DL (ref 0.2–1)
WBC # BLD AUTO: 8.3 K/UL (ref 4.6–13.2)
WBC URNS QL MICRO: NEGATIVE /HPF (ref 0–5)

## 2025-08-12 PROCEDURE — APPSS30 APP SPLIT SHARED TIME 16-30 MINUTES: Performed by: PHYSICIAN ASSISTANT

## 2025-08-12 PROCEDURE — 36600 WITHDRAWAL OF ARTERIAL BLOOD: CPT

## 2025-08-12 PROCEDURE — 6360000002 HC RX W HCPCS: Performed by: PHYSICIAN ASSISTANT

## 2025-08-12 PROCEDURE — 96375 TX/PRO/DX INJ NEW DRUG ADDON: CPT

## 2025-08-12 PROCEDURE — 84132 ASSAY OF SERUM POTASSIUM: CPT

## 2025-08-12 PROCEDURE — 84439 ASSAY OF FREE THYROXINE: CPT

## 2025-08-12 PROCEDURE — 85027 COMPLETE CBC AUTOMATED: CPT

## 2025-08-12 PROCEDURE — 6360000002 HC RX W HCPCS: Performed by: EMERGENCY MEDICINE

## 2025-08-12 PROCEDURE — 70450 CT HEAD/BRAIN W/O DYE: CPT

## 2025-08-12 PROCEDURE — 82077 ASSAY SPEC XCP UR&BREATH IA: CPT

## 2025-08-12 PROCEDURE — 84443 ASSAY THYROID STIM HORMONE: CPT

## 2025-08-12 PROCEDURE — 84295 ASSAY OF SERUM SODIUM: CPT

## 2025-08-12 PROCEDURE — 2580000003 HC RX 258: Performed by: PHYSICIAN ASSISTANT

## 2025-08-12 PROCEDURE — 93005 ELECTROCARDIOGRAM TRACING: CPT | Performed by: EMERGENCY MEDICINE

## 2025-08-12 PROCEDURE — 82803 BLOOD GASES ANY COMBINATION: CPT

## 2025-08-12 PROCEDURE — 82330 ASSAY OF CALCIUM: CPT

## 2025-08-12 PROCEDURE — 80307 DRUG TEST PRSMV CHEM ANLYZR: CPT

## 2025-08-12 PROCEDURE — 80179 DRUG ASSAY SALICYLATE: CPT

## 2025-08-12 PROCEDURE — 93010 ELECTROCARDIOGRAM REPORT: CPT | Performed by: INTERNAL MEDICINE

## 2025-08-12 PROCEDURE — 99291 CRITICAL CARE FIRST HOUR: CPT | Performed by: INTERNAL MEDICINE

## 2025-08-12 PROCEDURE — 2000000000 HC ICU R&B

## 2025-08-12 PROCEDURE — 99285 EMERGENCY DEPT VISIT HI MDM: CPT

## 2025-08-12 PROCEDURE — 94761 N-INVAS EAR/PLS OXIMETRY MLT: CPT

## 2025-08-12 PROCEDURE — 84703 CHORIONIC GONADOTROPIN ASSAY: CPT

## 2025-08-12 PROCEDURE — 87040 BLOOD CULTURE FOR BACTERIA: CPT

## 2025-08-12 PROCEDURE — 80053 COMPREHEN METABOLIC PANEL: CPT

## 2025-08-12 PROCEDURE — 84484 ASSAY OF TROPONIN QUANT: CPT

## 2025-08-12 PROCEDURE — 2500000003 HC RX 250 WO HCPCS: Performed by: PHYSICIAN ASSISTANT

## 2025-08-12 PROCEDURE — 84145 PROCALCITONIN (PCT): CPT

## 2025-08-12 PROCEDURE — 80143 DRUG ASSAY ACETAMINOPHEN: CPT

## 2025-08-12 PROCEDURE — 85014 HEMATOCRIT: CPT

## 2025-08-12 PROCEDURE — 81001 URINALYSIS AUTO W/SCOPE: CPT

## 2025-08-12 PROCEDURE — 83605 ASSAY OF LACTIC ACID: CPT

## 2025-08-12 PROCEDURE — 96374 THER/PROPH/DIAG INJ IV PUSH: CPT

## 2025-08-12 PROCEDURE — 2500000003 HC RX 250 WO HCPCS: Performed by: EMERGENCY MEDICINE

## 2025-08-12 PROCEDURE — 36415 COLL VENOUS BLD VENIPUNCTURE: CPT

## 2025-08-12 PROCEDURE — 82962 GLUCOSE BLOOD TEST: CPT

## 2025-08-12 PROCEDURE — 71045 X-RAY EXAM CHEST 1 VIEW: CPT

## 2025-08-12 PROCEDURE — 82947 ASSAY GLUCOSE BLOOD QUANT: CPT

## 2025-08-12 RX ORDER — HYDROCORTISONE SODIUM SUCCINATE 100 MG/2ML
100 INJECTION INTRAMUSCULAR; INTRAVENOUS EVERY 8 HOURS
Status: DISCONTINUED | OUTPATIENT
Start: 2025-08-12 | End: 2025-08-13

## 2025-08-12 RX ORDER — POTASSIUM CHLORIDE 1500 MG/1
40 TABLET, EXTENDED RELEASE ORAL PRN
Status: DISCONTINUED | OUTPATIENT
Start: 2025-08-12 | End: 2025-08-15 | Stop reason: HOSPADM

## 2025-08-12 RX ORDER — ONDANSETRON 4 MG/1
4 TABLET, ORALLY DISINTEGRATING ORAL EVERY 8 HOURS PRN
Status: DISCONTINUED | OUTPATIENT
Start: 2025-08-12 | End: 2025-08-15 | Stop reason: HOSPADM

## 2025-08-12 RX ORDER — MAGNESIUM SULFATE IN WATER 40 MG/ML
2000 INJECTION, SOLUTION INTRAVENOUS PRN
Status: DISCONTINUED | OUTPATIENT
Start: 2025-08-12 | End: 2025-08-15 | Stop reason: HOSPADM

## 2025-08-12 RX ORDER — HYDRALAZINE HYDROCHLORIDE 20 MG/ML
10 INJECTION INTRAMUSCULAR; INTRAVENOUS EVERY 6 HOURS PRN
Status: DISCONTINUED | OUTPATIENT
Start: 2025-08-12 | End: 2025-08-15 | Stop reason: HOSPADM

## 2025-08-12 RX ORDER — ACETAMINOPHEN 650 MG/1
650 SUPPOSITORY RECTAL EVERY 6 HOURS PRN
Status: DISCONTINUED | OUTPATIENT
Start: 2025-08-12 | End: 2025-08-15 | Stop reason: HOSPADM

## 2025-08-12 RX ORDER — LEVOTHYROXINE SODIUM ANHYDROUS 100 UG/5ML
400 INJECTION, POWDER, LYOPHILIZED, FOR SOLUTION INTRAVENOUS ONCE
Status: DISCONTINUED | OUTPATIENT
Start: 2025-08-12 | End: 2025-08-12

## 2025-08-12 RX ORDER — ENOXAPARIN SODIUM 100 MG/ML
30 INJECTION SUBCUTANEOUS 2 TIMES DAILY
Status: DISCONTINUED | OUTPATIENT
Start: 2025-08-12 | End: 2025-08-12

## 2025-08-12 RX ORDER — SODIUM CHLORIDE 0.9 % (FLUSH) 0.9 %
5-40 SYRINGE (ML) INJECTION PRN
Status: DISCONTINUED | OUTPATIENT
Start: 2025-08-12 | End: 2025-08-15 | Stop reason: HOSPADM

## 2025-08-12 RX ORDER — SODIUM CHLORIDE 0.9 % (FLUSH) 0.9 %
5-40 SYRINGE (ML) INJECTION EVERY 12 HOURS SCHEDULED
Status: DISCONTINUED | OUTPATIENT
Start: 2025-08-12 | End: 2025-08-15 | Stop reason: HOSPADM

## 2025-08-12 RX ORDER — ONDANSETRON 2 MG/ML
4 INJECTION INTRAMUSCULAR; INTRAVENOUS EVERY 6 HOURS PRN
Status: DISCONTINUED | OUTPATIENT
Start: 2025-08-12 | End: 2025-08-15 | Stop reason: HOSPADM

## 2025-08-12 RX ORDER — POTASSIUM CHLORIDE 7.45 MG/ML
10 INJECTION INTRAVENOUS PRN
Status: DISCONTINUED | OUTPATIENT
Start: 2025-08-12 | End: 2025-08-15 | Stop reason: HOSPADM

## 2025-08-12 RX ORDER — LIOTHYRONINE SODIUM 10 UG/ML
10 INJECTION, SOLUTION INTRAVENOUS ONCE
Status: COMPLETED | OUTPATIENT
Start: 2025-08-12 | End: 2025-08-12

## 2025-08-12 RX ORDER — ACETAMINOPHEN 325 MG/1
650 TABLET ORAL EVERY 6 HOURS PRN
Status: DISCONTINUED | OUTPATIENT
Start: 2025-08-12 | End: 2025-08-15 | Stop reason: HOSPADM

## 2025-08-12 RX ORDER — VANCOMYCIN 1.75 G/350ML
1250 INJECTION, SOLUTION INTRAVENOUS EVERY 12 HOURS
Status: DISCONTINUED | OUTPATIENT
Start: 2025-08-12 | End: 2025-08-13

## 2025-08-12 RX ORDER — HEPARIN SODIUM 5000 [USP'U]/ML
5000 INJECTION, SOLUTION INTRAVENOUS; SUBCUTANEOUS EVERY 8 HOURS SCHEDULED
Status: DISCONTINUED | OUTPATIENT
Start: 2025-08-12 | End: 2025-08-15 | Stop reason: HOSPADM

## 2025-08-12 RX ORDER — LIOTHYRONINE SODIUM 10 UG/ML
5 INJECTION, SOLUTION INTRAVENOUS EVERY 8 HOURS
Status: DISCONTINUED | OUTPATIENT
Start: 2025-08-12 | End: 2025-08-14

## 2025-08-12 RX ORDER — HYDROCORTISONE SODIUM SUCCINATE 100 MG/2ML
100 INJECTION INTRAMUSCULAR; INTRAVENOUS ONCE
Status: COMPLETED | OUTPATIENT
Start: 2025-08-12 | End: 2025-08-12

## 2025-08-12 RX ORDER — SODIUM CHLORIDE 9 MG/ML
INJECTION, SOLUTION INTRAVENOUS PRN
Status: DISCONTINUED | OUTPATIENT
Start: 2025-08-12 | End: 2025-08-15 | Stop reason: HOSPADM

## 2025-08-12 RX ORDER — FUROSEMIDE 10 MG/ML
40 INJECTION INTRAMUSCULAR; INTRAVENOUS ONCE
Status: COMPLETED | OUTPATIENT
Start: 2025-08-12 | End: 2025-08-12

## 2025-08-12 RX ADMIN — HEPARIN SODIUM 5000 UNITS: 5000 INJECTION INTRAVENOUS; SUBCUTANEOUS at 21:31

## 2025-08-12 RX ADMIN — HYDROCORTISONE SODIUM SUCCINATE 100 MG: 100 INJECTION, POWDER, FOR SOLUTION INTRAMUSCULAR; INTRAVENOUS at 21:31

## 2025-08-12 RX ADMIN — LIOTHYRONINE SODIUM 10 MCG: 10 INJECTION, SOLUTION INTRAVENOUS at 06:22

## 2025-08-12 RX ADMIN — CEFEPIME 2000 MG: 2 INJECTION, POWDER, FOR SOLUTION INTRAVENOUS at 21:36

## 2025-08-12 RX ADMIN — SODIUM CHLORIDE, PRESERVATIVE FREE 10 ML: 5 INJECTION INTRAVENOUS at 09:15

## 2025-08-12 RX ADMIN — VANCOMYCIN HYDROCHLORIDE 2500 MG: 500 INJECTION, POWDER, LYOPHILIZED, FOR SOLUTION INTRAVENOUS at 11:29

## 2025-08-12 RX ADMIN — LEVOTHYROXINE SODIUM ANHYDROUS 400 MCG: 100 INJECTION, POWDER, LYOPHILIZED, FOR SOLUTION INTRAVENOUS at 05:18

## 2025-08-12 RX ADMIN — HEPARIN SODIUM 5000 UNITS: 5000 INJECTION INTRAVENOUS; SUBCUTANEOUS at 06:49

## 2025-08-12 RX ADMIN — HYDRALAZINE HYDROCHLORIDE 10 MG: 20 INJECTION, SOLUTION INTRAMUSCULAR; INTRAVENOUS at 05:53

## 2025-08-12 RX ADMIN — VANCOMYCIN 1250 MG: 1.75 INJECTION, SOLUTION INTRAVENOUS at 21:39

## 2025-08-12 RX ADMIN — SODIUM CHLORIDE, PRESERVATIVE FREE 10 ML: 5 INJECTION INTRAVENOUS at 21:31

## 2025-08-12 RX ADMIN — FUROSEMIDE 40 MG: 10 INJECTION, SOLUTION INTRAMUSCULAR; INTRAVENOUS at 06:11

## 2025-08-12 RX ADMIN — HYDROCORTISONE SODIUM SUCCINATE 100 MG: 100 INJECTION, POWDER, FOR SOLUTION INTRAMUSCULAR; INTRAVENOUS at 05:17

## 2025-08-12 RX ADMIN — HEPARIN SODIUM 5000 UNITS: 5000 INJECTION INTRAVENOUS; SUBCUTANEOUS at 14:32

## 2025-08-12 RX ADMIN — CEFEPIME 2000 MG: 2 INJECTION, POWDER, FOR SOLUTION INTRAVENOUS at 06:18

## 2025-08-12 RX ADMIN — CEFEPIME 2000 MG: 2 INJECTION, POWDER, FOR SOLUTION INTRAVENOUS at 14:32

## 2025-08-12 RX ADMIN — HYDROCORTISONE SODIUM SUCCINATE 100 MG: 100 INJECTION, POWDER, FOR SOLUTION INTRAMUSCULAR; INTRAVENOUS at 14:32

## 2025-08-12 RX ADMIN — LIOTHYRONINE SODIUM 5 MCG: 10 INJECTION, SOLUTION INTRAVENOUS at 21:30

## 2025-08-12 RX ADMIN — LIOTHYRONINE SODIUM 5 MCG: 10 INJECTION, SOLUTION INTRAVENOUS at 14:32

## 2025-08-12 ASSESSMENT — PAIN DESCRIPTION - PAIN TYPE: TYPE: ACUTE PAIN

## 2025-08-12 ASSESSMENT — ENCOUNTER SYMPTOMS
NAUSEA: 0
VOMITING: 0
ABDOMINAL DISTENTION: 0
SHORTNESS OF BREATH: 0

## 2025-08-12 ASSESSMENT — PAIN SCALES - GENERAL
PAINLEVEL_OUTOF10: 4
PAINLEVEL_OUTOF10: 0

## 2025-08-12 ASSESSMENT — PAIN DESCRIPTION - DESCRIPTORS: DESCRIPTORS: ACHING

## 2025-08-12 ASSESSMENT — PAIN - FUNCTIONAL ASSESSMENT
PAIN_FUNCTIONAL_ASSESSMENT: 0-10
PAIN_FUNCTIONAL_ASSESSMENT: ACTIVITIES ARE NOT PREVENTED

## 2025-08-12 ASSESSMENT — PAIN DESCRIPTION - LOCATION: LOCATION: HEAD

## 2025-08-12 ASSESSMENT — PAIN DESCRIPTION - ORIENTATION: ORIENTATION: MID

## 2025-08-13 LAB
ANION GAP SERPL CALC-SCNC: 13 MMOL/L (ref 3–18)
BASOPHILS # BLD: 0.04 K/UL (ref 0–0.1)
BASOPHILS NFR BLD: 0.3 % (ref 0–2)
BUN SERPL-MCNC: 12 MG/DL (ref 6–23)
BUN/CREAT SERPL: 16 (ref 12–20)
CALCIUM SERPL-MCNC: 9.2 MG/DL (ref 8.5–10.1)
CHLORIDE SERPL-SCNC: 96 MMOL/L (ref 98–107)
CO2 SERPL-SCNC: 28 MMOL/L (ref 21–32)
CREAT SERPL-MCNC: 0.75 MG/DL (ref 0.6–1.3)
DIFFERENTIAL METHOD BLD: ABNORMAL
EOSINOPHIL # BLD: 0.01 K/UL (ref 0–0.4)
EOSINOPHIL NFR BLD: 0.1 % (ref 0–5)
ERYTHROCYTE [DISTWIDTH] IN BLOOD BY AUTOMATED COUNT: 15.9 % (ref 11.6–14.5)
GLUCOSE BLD STRIP.AUTO-MCNC: 117 MG/DL (ref 70–110)
GLUCOSE SERPL-MCNC: 128 MG/DL (ref 74–108)
HCT VFR BLD AUTO: 35.8 % (ref 35–45)
HGB BLD-MCNC: 10.8 G/DL (ref 12–16)
IMM GRANULOCYTES # BLD AUTO: 0.08 K/UL (ref 0–0.04)
IMM GRANULOCYTES NFR BLD AUTO: 0.6 % (ref 0–0.5)
LYMPHOCYTES # BLD: 1.02 K/UL (ref 0.9–3.6)
LYMPHOCYTES NFR BLD: 8.2 % (ref 21–52)
MAGNESIUM SERPL-MCNC: 2.5 MG/DL (ref 1.6–2.6)
MCH RBC QN AUTO: 26.3 PG (ref 24–34)
MCHC RBC AUTO-ENTMCNC: 30.2 G/DL (ref 31–37)
MCV RBC AUTO: 87.3 FL (ref 78–100)
MONOCYTES # BLD: 0.45 K/UL (ref 0.05–1.2)
MONOCYTES NFR BLD: 3.6 % (ref 3–10)
NEUTS SEG # BLD: 10.87 K/UL (ref 1.8–8)
NEUTS SEG NFR BLD: 87.2 % (ref 40–73)
NRBC # BLD: 0 K/UL (ref 0–0.01)
NRBC BLD-RTO: 0 PER 100 WBC
PHOSPHATE SERPL-MCNC: 3.2 MG/DL (ref 2.5–4.9)
PLATELET # BLD AUTO: 340 K/UL (ref 135–420)
PMV BLD AUTO: 12.3 FL (ref 9.2–11.8)
POTASSIUM SERPL-SCNC: 3.6 MMOL/L (ref 3.5–5.5)
RBC # BLD AUTO: 4.1 M/UL (ref 4.2–5.3)
SODIUM SERPL-SCNC: 137 MMOL/L (ref 136–145)
T3FREE SERPL-MCNC: 1.5 PG/ML (ref 3.1–6.8)
T4 FREE SERPL-MCNC: 0.4 NG/DL (ref 0.9–1.7)
TSH, 3RD GENERATION: 20.9 UIU/ML (ref 0.27–4.2)
VANCOMYCIN SERPL-MCNC: 18.2 UG/ML (ref 5–40)
WBC # BLD AUTO: 12.5 K/UL (ref 4.6–13.2)

## 2025-08-13 PROCEDURE — 2580000003 HC RX 258: Performed by: PHYSICIAN ASSISTANT

## 2025-08-13 PROCEDURE — 6360000002 HC RX W HCPCS: Performed by: PHYSICIAN ASSISTANT

## 2025-08-13 PROCEDURE — 82962 GLUCOSE BLOOD TEST: CPT

## 2025-08-13 PROCEDURE — 84100 ASSAY OF PHOSPHORUS: CPT

## 2025-08-13 PROCEDURE — 80048 BASIC METABOLIC PNL TOTAL CA: CPT

## 2025-08-13 PROCEDURE — 97162 PT EVAL MOD COMPLEX 30 MIN: CPT

## 2025-08-13 PROCEDURE — 99291 CRITICAL CARE FIRST HOUR: CPT | Performed by: INTERNAL MEDICINE

## 2025-08-13 PROCEDURE — 92610 EVALUATE SWALLOWING FUNCTION: CPT

## 2025-08-13 PROCEDURE — 92526 ORAL FUNCTION THERAPY: CPT

## 2025-08-13 PROCEDURE — 2500000003 HC RX 250 WO HCPCS: Performed by: PHYSICIAN ASSISTANT

## 2025-08-13 PROCEDURE — 80202 ASSAY OF VANCOMYCIN: CPT

## 2025-08-13 PROCEDURE — 94761 N-INVAS EAR/PLS OXIMETRY MLT: CPT

## 2025-08-13 PROCEDURE — 84443 ASSAY THYROID STIM HORMONE: CPT

## 2025-08-13 PROCEDURE — 6370000000 HC RX 637 (ALT 250 FOR IP): Performed by: STUDENT IN AN ORGANIZED HEALTH CARE EDUCATION/TRAINING PROGRAM

## 2025-08-13 PROCEDURE — 85025 COMPLETE CBC W/AUTO DIFF WBC: CPT

## 2025-08-13 PROCEDURE — 1100000003 HC PRIVATE W/ TELEMETRY

## 2025-08-13 PROCEDURE — 97110 THERAPEUTIC EXERCISES: CPT

## 2025-08-13 PROCEDURE — 36415 COLL VENOUS BLD VENIPUNCTURE: CPT

## 2025-08-13 PROCEDURE — 2500000003 HC RX 250 WO HCPCS: Performed by: INTERNAL MEDICINE

## 2025-08-13 PROCEDURE — 6360000002 HC RX W HCPCS: Performed by: INTERNAL MEDICINE

## 2025-08-13 PROCEDURE — 83735 ASSAY OF MAGNESIUM: CPT

## 2025-08-13 PROCEDURE — APPSS15 APP SPLIT SHARED TIME 0-15 MINUTES: Performed by: NURSE PRACTITIONER

## 2025-08-13 PROCEDURE — 84481 FREE ASSAY (FT-3): CPT

## 2025-08-13 PROCEDURE — 84439 ASSAY OF FREE THYROXINE: CPT

## 2025-08-13 PROCEDURE — 97166 OT EVAL MOD COMPLEX 45 MIN: CPT

## 2025-08-13 PROCEDURE — 2700000000 HC OXYGEN THERAPY PER DAY

## 2025-08-13 PROCEDURE — 97535 SELF CARE MNGMENT TRAINING: CPT

## 2025-08-13 RX ORDER — ESCITALOPRAM OXALATE 10 MG/1
10 TABLET ORAL DAILY
Status: DISCONTINUED | OUTPATIENT
Start: 2025-08-13 | End: 2025-08-15 | Stop reason: HOSPADM

## 2025-08-13 RX ORDER — VANCOMYCIN 1.5 G/300ML
1500 INJECTION, SOLUTION INTRAVENOUS EVERY 12 HOURS
Status: DISCONTINUED | OUTPATIENT
Start: 2025-08-13 | End: 2025-08-14

## 2025-08-13 RX ADMIN — LIOTHYRONINE SODIUM 5 MCG: 10 INJECTION, SOLUTION INTRAVENOUS at 07:09

## 2025-08-13 RX ADMIN — SODIUM CHLORIDE, PRESERVATIVE FREE 10 ML: 5 INJECTION INTRAVENOUS at 10:14

## 2025-08-13 RX ADMIN — HEPARIN SODIUM 5000 UNITS: 5000 INJECTION INTRAVENOUS; SUBCUTANEOUS at 20:56

## 2025-08-13 RX ADMIN — SODIUM CHLORIDE, PRESERVATIVE FREE 10 ML: 5 INJECTION INTRAVENOUS at 20:56

## 2025-08-13 RX ADMIN — LIOTHYRONINE SODIUM 5 MCG: 10 INJECTION, SOLUTION INTRAVENOUS at 14:02

## 2025-08-13 RX ADMIN — HEPARIN SODIUM 5000 UNITS: 5000 INJECTION INTRAVENOUS; SUBCUTANEOUS at 07:09

## 2025-08-13 RX ADMIN — CEFEPIME 2000 MG: 2 INJECTION, POWDER, FOR SOLUTION INTRAVENOUS at 21:10

## 2025-08-13 RX ADMIN — HYDROCORTISONE SODIUM SUCCINATE 100 MG: 100 INJECTION, POWDER, FOR SOLUTION INTRAMUSCULAR; INTRAVENOUS at 07:09

## 2025-08-13 RX ADMIN — LIOTHYRONINE SODIUM 5 MCG: 10 INJECTION, SOLUTION INTRAVENOUS at 20:56

## 2025-08-13 RX ADMIN — LEVOTHYROXINE SODIUM ANHYDROUS 100 MCG: 100 INJECTION, POWDER, LYOPHILIZED, FOR SOLUTION INTRAVENOUS at 08:57

## 2025-08-13 RX ADMIN — VANCOMYCIN 1500 MG: 1.5 INJECTION, SOLUTION INTRAVENOUS at 22:29

## 2025-08-13 RX ADMIN — VANCOMYCIN 1500 MG: 1.5 INJECTION, SOLUTION INTRAVENOUS at 10:13

## 2025-08-13 RX ADMIN — CEFEPIME 2000 MG: 2 INJECTION, POWDER, FOR SOLUTION INTRAVENOUS at 07:10

## 2025-08-13 RX ADMIN — CEFEPIME 2000 MG: 2 INJECTION, POWDER, FOR SOLUTION INTRAVENOUS at 14:04

## 2025-08-13 RX ADMIN — ESCITALOPRAM OXALATE 10 MG: 5 TABLET, FILM COATED ORAL at 14:02

## 2025-08-13 RX ADMIN — HEPARIN SODIUM 5000 UNITS: 5000 INJECTION INTRAVENOUS; SUBCUTANEOUS at 14:50

## 2025-08-13 ASSESSMENT — PAIN SCALES - GENERAL
PAINLEVEL_OUTOF10: 0

## 2025-08-14 PROBLEM — G93.41 ACUTE METABOLIC ENCEPHALOPATHY: Status: ACTIVE | Noted: 2025-08-14

## 2025-08-14 LAB
ANION GAP SERPL CALC-SCNC: 13 MMOL/L (ref 3–18)
BASOPHILS # BLD: 0.06 K/UL (ref 0–0.1)
BASOPHILS NFR BLD: 0.7 % (ref 0–2)
BUN SERPL-MCNC: 18 MG/DL (ref 6–23)
BUN/CREAT SERPL: 21 (ref 12–20)
CALCIUM SERPL-MCNC: 9.2 MG/DL (ref 8.5–10.1)
CHLORIDE SERPL-SCNC: 100 MMOL/L (ref 98–107)
CO2 SERPL-SCNC: 28 MMOL/L (ref 21–32)
CREAT SERPL-MCNC: 0.84 MG/DL (ref 0.6–1.3)
DIFFERENTIAL METHOD BLD: ABNORMAL
EOSINOPHIL # BLD: 0.13 K/UL (ref 0–0.4)
EOSINOPHIL NFR BLD: 1.4 % (ref 0–5)
ERYTHROCYTE [DISTWIDTH] IN BLOOD BY AUTOMATED COUNT: 16.2 % (ref 11.6–14.5)
GLUCOSE SERPL-MCNC: 127 MG/DL (ref 74–108)
HCT VFR BLD AUTO: 34.2 % (ref 35–45)
HGB BLD-MCNC: 10.3 G/DL (ref 12–16)
IMM GRANULOCYTES # BLD AUTO: 0.09 K/UL (ref 0–0.04)
IMM GRANULOCYTES NFR BLD AUTO: 1 % (ref 0–0.5)
LYMPHOCYTES # BLD: 2.21 K/UL (ref 0.9–3.6)
LYMPHOCYTES NFR BLD: 24.3 % (ref 21–52)
MAGNESIUM SERPL-MCNC: 2.6 MG/DL (ref 1.6–2.6)
MCH RBC QN AUTO: 27 PG (ref 24–34)
MCHC RBC AUTO-ENTMCNC: 30.1 G/DL (ref 31–37)
MCV RBC AUTO: 89.5 FL (ref 78–100)
MONOCYTES # BLD: 0.65 K/UL (ref 0.05–1.2)
MONOCYTES NFR BLD: 7.2 % (ref 3–10)
NEUTS SEG # BLD: 5.95 K/UL (ref 1.8–8)
NEUTS SEG NFR BLD: 65.4 % (ref 40–73)
NRBC # BLD: 0 K/UL (ref 0–0.01)
NRBC BLD-RTO: 0 PER 100 WBC
PHOSPHATE SERPL-MCNC: 2.6 MG/DL (ref 2.5–4.9)
PLATELET # BLD AUTO: 278 K/UL (ref 135–420)
PMV BLD AUTO: 12.2 FL (ref 9.2–11.8)
POTASSIUM SERPL-SCNC: 3.5 MMOL/L (ref 3.5–5.5)
RBC # BLD AUTO: 3.82 M/UL (ref 4.2–5.3)
SODIUM SERPL-SCNC: 141 MMOL/L (ref 136–145)
T4 FREE SERPL-MCNC: 0.4 NG/DL (ref 0.9–1.7)
TSH, 3RD GENERATION: 58.6 UIU/ML (ref 0.27–4.2)
WBC # BLD AUTO: 9.1 K/UL (ref 4.6–13.2)

## 2025-08-14 PROCEDURE — 2500000003 HC RX 250 WO HCPCS: Performed by: PHYSICIAN ASSISTANT

## 2025-08-14 PROCEDURE — 83735 ASSAY OF MAGNESIUM: CPT

## 2025-08-14 PROCEDURE — 84100 ASSAY OF PHOSPHORUS: CPT

## 2025-08-14 PROCEDURE — 6370000000 HC RX 637 (ALT 250 FOR IP): Performed by: STUDENT IN AN ORGANIZED HEALTH CARE EDUCATION/TRAINING PROGRAM

## 2025-08-14 PROCEDURE — 85025 COMPLETE CBC W/AUTO DIFF WBC: CPT

## 2025-08-14 PROCEDURE — 36415 COLL VENOUS BLD VENIPUNCTURE: CPT

## 2025-08-14 PROCEDURE — 6360000002 HC RX W HCPCS: Performed by: PHYSICIAN ASSISTANT

## 2025-08-14 PROCEDURE — 84439 ASSAY OF FREE THYROXINE: CPT

## 2025-08-14 PROCEDURE — 2500000003 HC RX 250 WO HCPCS: Performed by: INTERNAL MEDICINE

## 2025-08-14 PROCEDURE — 99232 SBSQ HOSP IP/OBS MODERATE 35: CPT | Performed by: HOSPITALIST

## 2025-08-14 PROCEDURE — 1100000003 HC PRIVATE W/ TELEMETRY

## 2025-08-14 PROCEDURE — 6360000002 HC RX W HCPCS: Performed by: INTERNAL MEDICINE

## 2025-08-14 PROCEDURE — 84443 ASSAY THYROID STIM HORMONE: CPT

## 2025-08-14 PROCEDURE — 2580000003 HC RX 258: Performed by: PHYSICIAN ASSISTANT

## 2025-08-14 PROCEDURE — 80048 BASIC METABOLIC PNL TOTAL CA: CPT

## 2025-08-14 RX ORDER — LIOTHYRONINE SODIUM 10 UG/ML
10 INJECTION, SOLUTION INTRAVENOUS EVERY 8 HOURS
Status: DISCONTINUED | OUTPATIENT
Start: 2025-08-14 | End: 2025-08-15 | Stop reason: HOSPADM

## 2025-08-14 RX ADMIN — LEVOTHYROXINE SODIUM ANHYDROUS 100 MCG: 100 INJECTION, POWDER, LYOPHILIZED, FOR SOLUTION INTRAVENOUS at 05:59

## 2025-08-14 RX ADMIN — HEPARIN SODIUM 5000 UNITS: 5000 INJECTION INTRAVENOUS; SUBCUTANEOUS at 14:43

## 2025-08-14 RX ADMIN — LIOTHYRONINE SODIUM 5 MCG: 10 INJECTION, SOLUTION INTRAVENOUS at 14:44

## 2025-08-14 RX ADMIN — SODIUM CHLORIDE, PRESERVATIVE FREE 10 ML: 5 INJECTION INTRAVENOUS at 09:05

## 2025-08-14 RX ADMIN — VANCOMYCIN 1500 MG: 1.5 INJECTION, SOLUTION INTRAVENOUS at 12:07

## 2025-08-14 RX ADMIN — ESCITALOPRAM OXALATE 10 MG: 5 TABLET, FILM COATED ORAL at 09:05

## 2025-08-14 RX ADMIN — CEFEPIME 2000 MG: 2 INJECTION, POWDER, FOR SOLUTION INTRAVENOUS at 14:53

## 2025-08-14 RX ADMIN — LIOTHYRONINE SODIUM 5 MCG: 10 INJECTION, SOLUTION INTRAVENOUS at 05:59

## 2025-08-14 RX ADMIN — HEPARIN SODIUM 5000 UNITS: 5000 INJECTION INTRAVENOUS; SUBCUTANEOUS at 06:00

## 2025-08-14 RX ADMIN — CEFEPIME 2000 MG: 2 INJECTION, POWDER, FOR SOLUTION INTRAVENOUS at 06:03

## 2025-08-14 ASSESSMENT — PAIN SCALES - GENERAL
PAINLEVEL_OUTOF10: 0

## 2025-08-15 VITALS
HEART RATE: 90 BPM | DIASTOLIC BLOOD PRESSURE: 75 MMHG | HEIGHT: 61 IN | OXYGEN SATURATION: 98 % | BODY MASS INDEX: 55.32 KG/M2 | TEMPERATURE: 97.3 F | RESPIRATION RATE: 18 BRPM | SYSTOLIC BLOOD PRESSURE: 122 MMHG | WEIGHT: 293 LBS

## 2025-08-15 PROBLEM — G93.41 ACUTE METABOLIC ENCEPHALOPATHY: Status: RESOLVED | Noted: 2025-08-14 | Resolved: 2025-08-15

## 2025-08-15 LAB
ANION GAP SERPL CALC-SCNC: 12 MMOL/L (ref 3–18)
BASOPHILS # BLD: 0.08 K/UL (ref 0–0.1)
BASOPHILS NFR BLD: 1 % (ref 0–2)
BUN SERPL-MCNC: 15 MG/DL (ref 6–23)
BUN/CREAT SERPL: 22 (ref 12–20)
CALCIUM SERPL-MCNC: 9.2 MG/DL (ref 8.5–10.1)
CHLORIDE SERPL-SCNC: 100 MMOL/L (ref 98–107)
CO2 SERPL-SCNC: 29 MMOL/L (ref 21–32)
CREAT SERPL-MCNC: 0.68 MG/DL (ref 0.6–1.3)
DIFFERENTIAL METHOD BLD: ABNORMAL
EOSINOPHIL # BLD: 0.13 K/UL (ref 0–0.4)
EOSINOPHIL NFR BLD: 1.7 % (ref 0–5)
ERYTHROCYTE [DISTWIDTH] IN BLOOD BY AUTOMATED COUNT: 16.3 % (ref 11.6–14.5)
GLUCOSE SERPL-MCNC: 123 MG/DL (ref 74–108)
HCT VFR BLD AUTO: 34.1 % (ref 35–45)
HGB BLD-MCNC: 10 G/DL (ref 12–16)
IMM GRANULOCYTES # BLD AUTO: 0.06 K/UL (ref 0–0.04)
IMM GRANULOCYTES NFR BLD AUTO: 0.8 % (ref 0–0.5)
LYMPHOCYTES # BLD: 1.69 K/UL (ref 0.9–3.6)
LYMPHOCYTES NFR BLD: 21.7 % (ref 21–52)
MAGNESIUM SERPL-MCNC: 2.5 MG/DL (ref 1.6–2.6)
MCH RBC QN AUTO: 25.8 PG (ref 24–34)
MCHC RBC AUTO-ENTMCNC: 29.3 G/DL (ref 31–37)
MCV RBC AUTO: 88.1 FL (ref 78–100)
MONOCYTES # BLD: 0.64 K/UL (ref 0.05–1.2)
MONOCYTES NFR BLD: 8.2 % (ref 3–10)
NEUTS SEG # BLD: 5.19 K/UL (ref 1.8–8)
NEUTS SEG NFR BLD: 66.6 % (ref 40–73)
NRBC # BLD: 0 K/UL (ref 0–0.01)
NRBC BLD-RTO: 0 PER 100 WBC
PHOSPHATE SERPL-MCNC: 1.9 MG/DL (ref 2.5–4.9)
PLATELET # BLD AUTO: 277 K/UL (ref 135–420)
PMV BLD AUTO: 12.3 FL (ref 9.2–11.8)
POTASSIUM SERPL-SCNC: 3.4 MMOL/L (ref 3.5–5.5)
PROCALCITONIN SERPL-MCNC: 0.05 NG/ML
RBC # BLD AUTO: 3.87 M/UL (ref 4.2–5.3)
SODIUM SERPL-SCNC: 140 MMOL/L (ref 136–145)
T4 FREE SERPL-MCNC: 0.4 NG/DL (ref 0.9–1.7)
TSH, 3RD GENERATION: 45.9 UIU/ML (ref 0.27–4.2)
WBC # BLD AUTO: 7.8 K/UL (ref 4.6–13.2)

## 2025-08-15 PROCEDURE — 94761 N-INVAS EAR/PLS OXIMETRY MLT: CPT

## 2025-08-15 PROCEDURE — 2580000003 HC RX 258: Performed by: PHYSICIAN ASSISTANT

## 2025-08-15 PROCEDURE — 6370000000 HC RX 637 (ALT 250 FOR IP): Performed by: STUDENT IN AN ORGANIZED HEALTH CARE EDUCATION/TRAINING PROGRAM

## 2025-08-15 PROCEDURE — 99239 HOSP IP/OBS DSCHRG MGMT >30: CPT | Performed by: HOSPITALIST

## 2025-08-15 PROCEDURE — 36415 COLL VENOUS BLD VENIPUNCTURE: CPT

## 2025-08-15 PROCEDURE — 85025 COMPLETE CBC W/AUTO DIFF WBC: CPT

## 2025-08-15 PROCEDURE — 84439 ASSAY OF FREE THYROXINE: CPT

## 2025-08-15 PROCEDURE — 2500000003 HC RX 250 WO HCPCS: Performed by: PHYSICIAN ASSISTANT

## 2025-08-15 PROCEDURE — 84145 PROCALCITONIN (PCT): CPT

## 2025-08-15 PROCEDURE — 80048 BASIC METABOLIC PNL TOTAL CA: CPT

## 2025-08-15 PROCEDURE — 84100 ASSAY OF PHOSPHORUS: CPT

## 2025-08-15 PROCEDURE — 84443 ASSAY THYROID STIM HORMONE: CPT

## 2025-08-15 PROCEDURE — 2500000003 HC RX 250 WO HCPCS: Performed by: INTERNAL MEDICINE

## 2025-08-15 PROCEDURE — 83735 ASSAY OF MAGNESIUM: CPT

## 2025-08-15 PROCEDURE — 2500000003 HC RX 250 WO HCPCS: Performed by: HOSPITALIST

## 2025-08-15 PROCEDURE — 6360000002 HC RX W HCPCS: Performed by: INTERNAL MEDICINE

## 2025-08-15 PROCEDURE — 6360000002 HC RX W HCPCS: Performed by: PHYSICIAN ASSISTANT

## 2025-08-15 PROCEDURE — 6370000000 HC RX 637 (ALT 250 FOR IP): Performed by: PHYSICIAN ASSISTANT

## 2025-08-15 PROCEDURE — 2580000003 HC RX 258: Performed by: HOSPITALIST

## 2025-08-15 RX ORDER — LIOTHYRONINE SODIUM 25 UG/1
25 TABLET ORAL DAILY
Qty: 30 TABLET | Refills: 2 | Status: SHIPPED | OUTPATIENT
Start: 2025-08-15

## 2025-08-15 RX ORDER — POTASSIUM CHLORIDE 1500 MG/1
20 TABLET, EXTENDED RELEASE ORAL DAILY
Qty: 30 TABLET | Refills: 0 | Status: SHIPPED | OUTPATIENT
Start: 2025-08-15

## 2025-08-15 RX ORDER — LEVOTHYROXINE SODIUM 300 UG/1
300 TABLET ORAL DAILY
Qty: 30 TABLET | Refills: 2 | Status: SHIPPED | OUTPATIENT
Start: 2025-08-15

## 2025-08-15 RX ADMIN — CEFEPIME 2000 MG: 2 INJECTION, POWDER, FOR SOLUTION INTRAVENOUS at 06:37

## 2025-08-15 RX ADMIN — POTASSIUM PHOSPHATE, MONOBASIC AND POTASSIUM PHOSPHATE, DIBASIC 20 MMOL: 224; 236 INJECTION, SOLUTION, CONCENTRATE INTRAVENOUS at 12:09

## 2025-08-15 RX ADMIN — ACETAMINOPHEN 650 MG: 325 TABLET ORAL at 11:18

## 2025-08-15 RX ADMIN — LEVOTHYROXINE SODIUM ANHYDROUS 300 MCG: 100 INJECTION, POWDER, LYOPHILIZED, FOR SOLUTION INTRAVENOUS at 06:42

## 2025-08-15 RX ADMIN — LIOTHYRONINE SODIUM 10 MCG: 10 INJECTION, SOLUTION INTRAVENOUS at 00:26

## 2025-08-15 RX ADMIN — HEPARIN SODIUM 5000 UNITS: 5000 INJECTION INTRAVENOUS; SUBCUTANEOUS at 13:40

## 2025-08-15 RX ADMIN — SODIUM CHLORIDE, PRESERVATIVE FREE 10 ML: 5 INJECTION INTRAVENOUS at 00:27

## 2025-08-15 RX ADMIN — LIOTHYRONINE SODIUM 10 MCG: 10 INJECTION, SOLUTION INTRAVENOUS at 06:35

## 2025-08-15 RX ADMIN — LIOTHYRONINE SODIUM 10 MCG: 10 INJECTION, SOLUTION INTRAVENOUS at 13:39

## 2025-08-15 RX ADMIN — ESCITALOPRAM OXALATE 10 MG: 5 TABLET, FILM COATED ORAL at 11:18

## 2025-08-15 RX ADMIN — SODIUM CHLORIDE, PRESERVATIVE FREE 10 ML: 5 INJECTION INTRAVENOUS at 11:25

## 2025-08-15 RX ADMIN — HEPARIN SODIUM 5000 UNITS: 5000 INJECTION INTRAVENOUS; SUBCUTANEOUS at 00:27

## 2025-08-15 RX ADMIN — HEPARIN SODIUM 5000 UNITS: 5000 INJECTION INTRAVENOUS; SUBCUTANEOUS at 06:35

## 2025-08-15 RX ADMIN — CEFEPIME 2000 MG: 2 INJECTION, POWDER, FOR SOLUTION INTRAVENOUS at 00:22

## 2025-08-15 ASSESSMENT — PAIN - FUNCTIONAL ASSESSMENT
PAIN_FUNCTIONAL_ASSESSMENT: 0-10
PAIN_FUNCTIONAL_ASSESSMENT: ACTIVITIES ARE NOT PREVENTED

## 2025-08-15 ASSESSMENT — PAIN SCALES - GENERAL: PAINLEVEL_OUTOF10: 5

## 2025-08-18 ENCOUNTER — CLINICAL DOCUMENTATION (OUTPATIENT)
Facility: CLINIC | Age: 35
End: 2025-08-18
